# Patient Record
Sex: MALE | Race: WHITE | Employment: FULL TIME | URBAN - METROPOLITAN AREA
[De-identification: names, ages, dates, MRNs, and addresses within clinical notes are randomized per-mention and may not be internally consistent; named-entity substitution may affect disease eponyms.]

---

## 2018-10-02 ENCOUNTER — HOSPITAL ENCOUNTER (OUTPATIENT)
Dept: PHYSICAL THERAPY | Age: 63
Discharge: HOME | End: 2018-10-02
Payer: COMMERCIAL

## 2018-10-02 PROCEDURE — 97112 NEUROMUSCULAR REEDUCATION: CPT

## 2018-10-02 PROCEDURE — 97161 PT EVAL LOW COMPLEX 20 MIN: CPT

## 2018-10-02 NOTE — PROGRESS NOTES
[facility] 
[address] 
[phone] fax Plan of Care/ Statement of Necessity for Physical Therapy Services Patient name: Lisa Smith Start of Care: 10/2/2018 Referral source: Roseanna Chong MD : 1955 Medical Diagnosis: Pain in left shoulder [M25.512] Onset Date:2018 Treatment Diagnosis: left shoulder pain Prior Hospitalization: see medical history Provider#: [id number] Medications: Verified on Patient summary List  
 Comorbidities: cervical spine fusion () Prior Level of Function: functionally independent, working in management/desk position The Plan of Care and following information is based on the information from the initial evaluation. Assessment/ key information: Patient is a 59year-old male who presents with chief complaint of left shoulder pain since falling on left side in 2018. Patient with history of cervical spinal fusion( ), completed course of physical therapy for right UE weakness, now greatly improved. Patient had MRI done on , which showed the following: lateral downsloping/undersurface spurring of the acromion likely contributing to outlet impingement of rotator cuff tendon, mild acromioclavicular arthrosis, subacromial-subdeltoid bursitis, distal anterior supraspinatus tendinosis with low grade partial tearing, small distal subscapularis tendon articular sided moderate grade partial- thickness tear, and proximal biceps tendinopathy with low to moderate grade partial thickness tearing. Patient has difficulty with over-head reaching, dressing, and lifting.  Upon further examination, patient found to have decrease left shoulder ROM ( 75 % gross range limited by pain), impaired left UE strength ( grossly 3+/5 shoulder flexion/ abduction IR/ ER, 4/5 elbow flexion/ ext),and forward head posture with rounded shoulders. Patient would benefit from skilled physical therapy to address deficits listed in order to restore left UE function and improve QOL. Evaluation Complexity History MEDIUM  Complexity : 1-2 comorbidities / personal factors will impact the outcome/ POC ; Examination MEDIUM Complexity : 3 Standardized tests and measures addressing body structure, function, activity limitation and / or participation in recreation  ;Presentation LOW Complexity : Stable, uncomplicated  ;Clinical Decision Making MEDIUM Complexity : FOTO score of 26-74 Overall Complexity Rating: LOW Problem List: pain affecting function, decrease ROM, decrease strength, decrease ADL/ functional abilitiies, decrease activity tolerance and decrease flexibility/ joint mobility Treatment Plan may include any combination of the following: Therapeutic exercise, Therapeutic activities, Neuromuscular re-education, Physical agent/modality, Manual therapy, Patient education, Self Care training, Functional mobility training and Home safety training Patient / Family readiness to learn indicated by: asking questions, trying to perform skills and interest 
Persons(s) to be included in education: patient (P) Barriers to Learning/Limitations: None Patient Goal (s): to avoid surgery and decrease UE pain Patient Self Reported Health Status: good Rehabilitation Potential: good Short Term Goals: To be accomplished in 2 weeks: SGT#1: Patient will be compliant with HEP in order to progress towards long term goals. Status at last note: initiated HEP Long Term Goals: To be accomplished in 8 weeks: LTG #1: Patient will score 66 points on FOTO in order to indicate improved function. Status at last note: 46 points at initial evaluation LTG#2: Patient will achieve 5/5 gross left shoulder strength to increase ease of daily tasks. Status at last note: grossly 3+/5 shoulder flexion/ abduction IR/ ER, 4/5 elbow flexion/ ext LTG#3: Patient will achieve full left shoulder ROM in all planes without pain in order to increase ease of overhead  activities/dressing/lifting. Status at last note: 75% gross range limited by pain LTG#4: Patient will report an overall 60 % improvement in function to restore prior level of function. Status at last note: Will continue to assess throughout course of treatment Frequency / Duration: Patient to be seen 2 times per week for 8 weeks. Patient/ Caregiver education and instruction: Diagnosis, prognosis, self care, activity modification and exercises 
 [x]  Plan of care has been reviewed with ALEXANDER Vivar, PT 10/2/2018 4:06 PM 
_____________________________________________________________________ I certify that the above Therapy Services are being furnished while the patient is under my care. I agree with the treatment plan and certify that this therapy is necessary. [de-identified] Signature:____________Date:_________TIME:________ 
 
[employer], Date and Time must be completed for valid certification ** Please sign and return to [facility] [address] 
[phone] fax

## 2018-10-02 NOTE — PROGRESS NOTES
PT DAILY TREATMENT NOTE 10-18 Patient Name: Isabel Escobar Date:10/2/2018 : 1955 [x]  Patient  Verified Payor: BLUE CROSS / Plan: [facility] PPO / Product Type: PPO / In time:330  Out time:350 Total Treatment Time (min): 20 Visit #: 1 of 16 Medicare/BCBS Only Total Timed Codes (min):  10 1:1 Treatment Time:  20  
 
 
Treatment Area: Pain in left shoulder [M25.512] SUBJECTIVE Pain Level (0-10 scale): 3-4 Any medication changes, allergies to medications, adverse drug reactions, diagnosis change, or new procedure performed?: [x] No    [] Yes (see summary sheet for update) Subjective functional status/changes:   [x] See paper initial evaluation form in patient chart. OBJECTIVE 10 min xEval                  []Re-Eval  
 
10 min Neuromuscular re-education:  [x] See flow sheet :  
Rationale: increase ROM and increase strength to improve the patients ability to increase ease of ADL's. With 
 [] TE 
 [] TA 
 [] neuro 
 [] other: Patient Education: [x] Review HEP [] Progressed/Changed HEP based on:  
[] positioning   [] body mechanics   [] transfers   [] heat/ice application   
[] other:   
 
Other Objective/Functional Measures: no increase in pain reported with gentle isometrics Pain Level (0-10 scale) post treatment:  3-4 ASSESSMENT/Changes in Function:  
 
Patient will continue to benefit from skilled PT services to modify and progress therapeutic interventions, address functional mobility deficits, address ROM deficits, address strength deficits, analyze and address soft tissue restrictions, analyze and cue movement patterns, analyze and modify body mechanics/ergonomics and instruct in home and community integration to attain remaining goals. [x]  See Plan of Care 
[]  See progress note/recertification 
[]  See Discharge Summary PLAN 
[]  Upgrade activities as tolerated     [x]  Continue plan of care []  Update interventions per flow sheet      
[]  Discharge due to:_ 
[]  Other:_   
 
 
 
 
Sandra Denny, PT 10/2/2018  6:56 PM 
 
Future Appointments Date Time Provider Tawana Grajeda 10/5/2018 7:30 AM Belle Momin, PT [id number]  
10/10/2018 5:00 PM Sandip Jaquez, PT [facility]  
10/12/2018 4:15  [address] [facility]  
10/15/2018 5:00  [address] [facility]  
10/17/2018 5:00  [address] [facility]  
10/22/2018 5:00  [address] [facility]  
10/24/2018 5:00  [address] [facility]  
10/29/2018 5:00  [address] [facility]  
10/31/2018 5:00 PM Kathrin Jaquez, PT Shannan Maldonado

## 2018-10-05 ENCOUNTER — APPOINTMENT (OUTPATIENT)
Dept: PHYSICAL THERAPY | Age: 63
End: 2018-10-05
Payer: COMMERCIAL

## 2018-10-10 ENCOUNTER — HOSPITAL ENCOUNTER (OUTPATIENT)
Dept: PHYSICAL THERAPY | Age: 63
Discharge: HOME | End: 2018-10-10
Payer: COMMERCIAL

## 2018-10-10 PROCEDURE — 97112 NEUROMUSCULAR REEDUCATION: CPT

## 2018-10-10 PROCEDURE — 97110 THERAPEUTIC EXERCISES: CPT

## 2018-10-10 PROCEDURE — 97140 MANUAL THERAPY 1/> REGIONS: CPT

## 2018-10-10 NOTE — PROGRESS NOTES
PT DAILY TREATMENT NOTE 10-18 Patient Name: Tete Worthy Date:10/10/2018 : 1955 [x]  Patient  Verified Payor: BLUE CROSS / Plan: [facility] PPO / Product Type: PPO / In time:5:00  Out time:5:48 Total Treatment Time (min): 48 Visit #: 2 of 16 Medicare/BCBS Only Total Timed Codes (min):  48 1:1 Treatment Time:  48 Treatment Area: Pain in left shoulder [M25.512] SUBJECTIVE Pain Level (0-10 scale): 5/10 Any medication changes, allergies to medications, adverse drug reactions, diagnosis change, or new procedure performed?: [x] No    [] Yes (see summary sheet for update) Subjective functional status/changes:   [] No changes reported \"It's getting close to the end of the day so its sore. \" OBJECTIVE 10 min Therapeutic Exercise:  [] See flow sheet :  
Rationale: increase ROM and increase strength to improve the patients ability to increase left shoulder function 23 min Neuromuscular Re-education:  []  See flow sheet :  
Rationale: increase strength and improve coordination  to improve the patients ability to stabilize left shoulder, improve posture, increase ease with ADLs 15 min Manual Therapy:  Assisted/resisted PNF D1, D2; AP/PA GHJ glides Gr I-II to increase ER/IR Rationale: decrease pain and increase ROM to restore proper GHJ biomechanics with left shoulder elevation without compensations With 
 [] TE 
 [] TA 
 [] neuro 
 [] other: Patient Education: [x] Review HEP [] Progressed/Changed HEP based on:  
[] positioning   [] body mechanics   [] transfers   [] heat/ice application   
[] other:   
 
Other Objective/Functional Measures: Initiated Rx program per flow sheet. Pt given verbal and demonstrative cueing for proper technique of all exercises. Pain Level (0-10 scale) post treatment: 0/10 ASSESSMENT/Changes in Function: Pt able to stabilize and set scapulae with exercises without cueing after being educated. Pt noted no increase in overall pain with exercises and reported resolution of soreness after session. Patient will continue to benefit from skilled PT services to address functional mobility deficits, address ROM deficits, address strength deficits, analyze and address soft tissue restrictions, analyze and cue movement patterns, analyze and modify body mechanics/ergonomics, assess and modify postural abnormalities and instruct in home and community integration to attain remaining goals. []  See Plan of Care 
[]  See progress note/recertification 
[]  See Discharge Summary Progress towards goals / Updated goals: 
Short Term Goals: To be accomplished in 2 weeks: STG#1: Patient will be compliant with HEP in order to progress towards long term goals. Status at last note: initiated HEP Current status: met - Pt reports compliance, updated today Long Term Goals: To be accomplished in 8 weeks: LTG #1: Patient will score 66 points on FOTO in order to indicate improved function. Status at last note: 46 points at initial evaluation LTG#2: Patient will achieve 5/5 gross left shoulder strength to increase ease of daily tasks. Status at last note: grossly 3+/5 shoulder flexion/ abduction IR/ ER, 4/5 elbow flexion/ ext LTG#3: Patient will achieve full left shoulder ROM in all planes without pain in order to increase ease of overhead activities/dressing/lifting. Status at last note: 75% gross range limited by pain LTG#4: Patient will report an overall 60 % improvement in function to restore prior level of function. Status at last note: Will continue to assess throughout course of treatment PLAN 
[]  Upgrade activities as tolerated     [x]  Continue plan of care 
[]  Update interventions per flow sheet      
[]  Discharge due to:_ 
[]  Other:_   
 
Skyler Jaquez, PT 10/10/2018  5:51 PM 
 
Future Appointments Date Time Provider Tawana Grajeda 10/12/2018 4:15  [address] [facility]  
10/15/2018 5:00  [address] [facility]  
10/17/2018 5:00  [address] [facility]  
10/22/2018 5:00  [address] [facility]  
10/24/2018 5:00  [address] [facility]  
10/29/2018 5:00  [address] [facility]  
10/31/2018 5:00 PM Kathrin Jaquez, PT Estefania Richardson

## 2018-10-12 ENCOUNTER — HOSPITAL ENCOUNTER (OUTPATIENT)
Dept: PHYSICAL THERAPY | Age: 63
Discharge: HOME | End: 2018-10-12
Payer: COMMERCIAL

## 2018-10-12 PROCEDURE — 97110 THERAPEUTIC EXERCISES: CPT

## 2018-10-12 PROCEDURE — 97140 MANUAL THERAPY 1/> REGIONS: CPT

## 2018-10-12 NOTE — PROGRESS NOTES
PT DAILY TREATMENT NOTE 10-18 Patient Name: Lisa Smith Date:10/12/2018 : 1955 [x]  Patient  Verified Payor: BLUE CROSS / Plan: [facility] PPO / Product Type: PPO / In time:4;15  Out time:5:00 Total Treatment Time (min): 45 Visit #: 3 of 16 Medicare/BCBS Only Total Timed Codes (min):  35 1:1 Treatment Time:  35 Treatment Area: Pain in left shoulder [M25.512] SUBJECTIVE Pain Level (0-10 scale): 2-3 Any medication changes, allergies to medications, adverse drug reactions, diagnosis change, or new procedure performed?: [x] No    [] Yes (see summary sheet for update) Subjective functional status/changes:   [] No changes reported I've been really sore. -6/10 this morning. OBJECTIVE Modality rationale: decrease pain to improve the patients ability to improve activity tolerance Min Type Additional Details  
 [] Estim:  []Unatt       []IFC  []Premod []Other:  []w/ice   []w/heat Position: Location:  
 [] Estim: []Att    []TENS instruct  []NMES []Other:  []w/US   []w/ice   []w/heat Position: Location:  
 []  Traction: [] Cervical       []Lumbar 
                     [] Prone          []Supine []Intermittent   []Continuous Lbs: 
[] before manual 
[] after manual  
 []  Ultrasound: []Continuous   [] Pulsed []1MHz   []3MHz W/cm2: 
Location:  
 []  Iontophoresis with dexamethasone Location: [] Take home patch  
[] In clinic  
10 [x]  Ice     []  heat 
[]  Ice massage 
[]  Laser  
[]  Anodyne Position:  sitting Location:  Left shoulder  
 []  Laser with stim 
[]  Other:  Position: Location:  
 []  Vasopneumatic Device Pressure:       [] lo [] med [] hi  
Temperature: [] lo [] med [] hi  
[x] Skin assessment post-treatment:  [x]intact []redness- no adverse reaction 
  []redness  adverse reaction:  
 
 
20 min Therapeutic Exercise:  [] See flow sheet :  
 Rationale: increase ROM and increase strength to improve the patients ability to perform OH reach,lifting 15 min Manual Therapy:  GHJ mobs, TPR to supraspinatus and infraspinatus. STM to UT and deltoid. Scap mobs/PNF's in SL Rationale: decrease pain, increase ROM and increase tissue extensibility to improve UE function, OH reach With 
 [] TE 
 [] TA 
 [] neuro 
 [] other: Patient Education: [x] Review HEP [] Progressed/Changed HEP based on:  
[] positioning   [] body mechanics   [] transfers   [] heat/ice application   
[] other:   
 
Other Objective/Functional Measures:   
 
Pain Level (0-10 scale) post treatment:  0 
 
ASSESSMENT/Changes in Function: TP's palpable in suprapinatus and biceps. Good scap mobility. Patient will continue to benefit from skilled PT services to modify and progress therapeutic interventions, address functional mobility deficits, address ROM deficits, address strength deficits, analyze and address soft tissue restrictions, analyze and cue movement patterns, analyze and modify body mechanics/ergonomics, assess and modify postural abnormalities, address imbalance/dizziness and instruct in home and community integration to attain remaining goals. []  See Plan of Care 
[]  See progress note/recertification 
[]  See Discharge Summary Progress towards goals / Updated goals: STG#1: Patient will be compliant with HEP in order to progress towards long term goals. Status at last note: initiated HEP Current status: met - Pt reports compliance, updated today Long Term Goals: To be accomplished in 8 weeks: LTG #1: Patient will score 66 points on FOTO in order to indicate improved function. Status at last note: 46 points at initial evaluation LTG#2: Patient will achieve 5/5 gross left shoulder strength to increase ease of daily tasks. Status at last note: grossly 3+/5 shoulder flexion/ abduction IR/ ER, 4/5 elbow flexion/ ext LTG#3: Patient will achieve full left shoulder ROM in all planes without pain in order to increase ease of overhead activities/dressing/lifting. Status at last note: 75% gross range limited by pain LTG#4: Patient will report an overall 60 % improvement in function to restore prior level of function. Status at last note: Will continue to assess throughout course of treatment PLAN [x]  Upgrade activities as tolerated     []  Continue plan of care 
[]  Update interventions per flow sheet      
[]  Discharge due to:_ 
[]  Other:_ Theora Cough, PTA 10/12/2018  4:17 PM 
 
Future Appointments Date Time Provider Tawana Grajeda 10/15/2018 5:00  [address] [facility]  
10/17/2018 5:00  [address] [facility]  
10/22/2018 5:00  [address] [facility]  
10/24/2018 5:00  [address] [facility]  
10/29/2018 5:00  [address] [facility]  
10/31/2018 5:00 PM Kathrin Jaquez, PT [facility]

## 2018-10-15 ENCOUNTER — HOSPITAL ENCOUNTER (OUTPATIENT)
Dept: PHYSICAL THERAPY | Age: 63
Discharge: HOME | End: 2018-10-15
Payer: COMMERCIAL

## 2018-10-15 PROCEDURE — 97140 MANUAL THERAPY 1/> REGIONS: CPT

## 2018-10-15 PROCEDURE — 97110 THERAPEUTIC EXERCISES: CPT

## 2018-10-15 NOTE — PROGRESS NOTES
PT DAILY TREATMENT NOTE 10-18 Patient Name: Chidi Chan Date:10/15/2018 : 1955 [x]  Patient  Verified Payor: BLUE CROSS / Plan: [facility] PPO / Product Type: PPO / In time:5:05  Out time:5;50 Total Treatment Time (min): 45 Visit #: 4 of 16 Medicare/BCBS Only Total Timed Codes (min):  35 1:1 Treatment Time:  35 Treatment Area: Pain in left shoulder [M25.512] SUBJECTIVE Pain Level (0-10 scale): 2-3 Any medication changes, allergies to medications, adverse drug reactions, diagnosis change, or new procedure performed?: [x] No    [] Yes (see summary sheet for update) Subjective functional status/changes:   [] No changes reported I did a lot of yard work Saturday to cleanup after the frank. I felt OK, but the pain yesterday was up to 5-6/10 OBJECTIVE Modality rationale: decrease pain and increase tissue extensibility to improve the patients ability to improve ease of mobility Min Type Additional Details  
 [] Estim:  []Unatt       []IFC  []Premod []Other:  []w/ice   []w/heat Position: Location:  
 [] Estim: []Att    []TENS instruct  []NMES []Other:  []w/US   []w/ice   []w/heat Position: Location:  
 []  Traction: [] Cervical       []Lumbar 
                     [] Prone          []Supine []Intermittent   []Continuous Lbs: 
[] before manual 
[] after manual  
 []  Ultrasound: []Continuous   [] Pulsed []1MHz   []3MHz W/cm2: 
Location:  
 []  Iontophoresis with dexamethasone Location: [] Take home patch  
[] In clinic  
10 [x]  Ice     []  heat 
[]  Ice massage 
[]  Laser  
[]  Anodyne Position:  supine Location:  Left shoulder  
 []  Laser with stim 
[]  Other:  Position: Location:  
 []  Vasopneumatic Device Pressure:       [] lo [] med [] hi  
Temperature: [] lo [] med [] hi  
[x] Skin assessment post-treatment:  [x]intact []redness- no adverse reaction 
  []redness  adverse reaction:  
 
 
15 min Therapeutic Exercise:  [] See flow sheet :  
Rationale: increase ROM and increase strength to improve the patients ability to perform daily tasks, reaching 25 min Manual Therapy:  TPS to infrascap, pec major (upper fibers),  Supraspinatus. Scap mobs. subclavius. Rationale: decrease pain, increase ROM and increase tissue extensibility to perform OH reaching, ADL's With 
 [] TE 
 [] TA 
 [] neuro 
 [] other: Patient Education: [x] Review HEP [] Progressed/Changed HEP based on:  
[] positioning   [] body mechanics   [] transfers   [] heat/ice application   
[] other:   
 
Other Objective/Functional Measures:   
 
Pain Level (0-10 scale) post treatment:  0 
 
ASSESSMENT/Changes in Function: pt progressing towards goals. Decreased c/o pain with wall slides. Patient will continue to benefit from skilled PT services to modify and progress therapeutic interventions, address functional mobility deficits, address ROM deficits, address strength deficits, analyze and address soft tissue restrictions, analyze and cue movement patterns, analyze and modify body mechanics/ergonomics, assess and modify postural abnormalities, address imbalance/dizziness and instruct in home and community integration to attain remaining goals. []  See Plan of Care 
[]  See progress note/recertification 
[]  See Discharge Summary Progress towards goals / Updated goals: STG#1: Patient will be compliant with HEP in order to progress towards long term goals. Status at last note: initiated HEP Current status: met - Pt reports compliance, updated today Long Term Goals: To be accomplished in 8 weeks: LTG #1: Patient will score 66 points on FOTO in order to indicate improved function. Status at last note: 46 points at initial evaluation LTG#2: Patient will achieve 5/5 gross left shoulder strength to increase ease of daily tasks. Status at last note: grossly 3+/5 shoulder flexion/ abduction IR/ ER, 4/5 elbow flexion/ ext LTG#3: Patient will achieve full left shoulder ROM in all planes without pain in order to increase ease of overhead activities/dressing/lifting. Status at last note: 75% gross range limited by pain LTG#4: Patient will report an overall 60 % improvement in function to restore prior level of function. Status at last note: Will continue to assess throughout course of treatment 
  
 
PLAN [x]  Upgrade activities as tolerated     []  Continue plan of care 
[]  Update interventions per flow sheet      
[]  Discharge due to:_ 
[]  Other:_ Kevin Otto PTA 10/15/2018  5:18 PM 
 
Future Appointments Date Time Provider Tawana Grajeda 10/17/2018 5:00  [address] [facility]  
10/22/2018 5:00  [address] [facility]  
10/24/2018 5:00  [address] [facility]  
10/29/2018 5:00  [address] [facility]  
10/31/2018 5:00 PM Kathrin Jaquez, PT [facility]

## 2018-10-17 ENCOUNTER — HOSPITAL ENCOUNTER (OUTPATIENT)
Dept: PHYSICAL THERAPY | Age: 63
Discharge: HOME | End: 2018-10-17
Payer: COMMERCIAL

## 2018-10-17 PROCEDURE — 97110 THERAPEUTIC EXERCISES: CPT

## 2018-10-17 PROCEDURE — 97140 MANUAL THERAPY 1/> REGIONS: CPT

## 2018-10-22 ENCOUNTER — HOSPITAL ENCOUNTER (OUTPATIENT)
Dept: PHYSICAL THERAPY | Age: 63
Discharge: HOME | End: 2018-10-22
Payer: COMMERCIAL

## 2018-10-22 PROCEDURE — 97110 THERAPEUTIC EXERCISES: CPT

## 2018-10-22 PROCEDURE — 97140 MANUAL THERAPY 1/> REGIONS: CPT

## 2018-10-22 NOTE — PROGRESS NOTES
PT DAILY TREATMENT NOTE 10-18 Patient Name: Austin Miller Date:10/22/2018 : 1955 [x]  Patient  Verified Payor: BLUE CROSS / Plan: [facility] PPO / Product Type: PPO / In time:458  Out time:552 Total Treatment Time (min): 54 Visit #: 6 of 16 Medicare/BCBS Only Total Timed Codes (min):  44 1:1 Treatment Time:  44 Treatment Area: Pain in left shoulder [M25.512] SUBJECTIVE Pain Level (0-10 scale): 3 Any medication changes, allergies to medications, adverse drug reactions, diagnosis change, or new procedure performed?: [x] No    [] Yes (see summary sheet for update) Subjective functional status/changes:   [] No changes reported The pain was even a little more earlier today. OBJECTIVE Modality rationale: decrease inflammation and decrease pain to improve the patients ability to improve activity tolerance Type Additional Details  
[] Estim:  []Unatt       []IFC  []Premod []Other:  []w/ice   []w/heat Position: Location:  
[] Estim: []Att    []TENS instruct  []NMES []Other:  []w/US   []w/ice   []w/heat Position: Location:  
[]  Traction: [] Cervical       []Lumbar 
                     [] Prone          []Supine []Intermittent   []Continuous Lbs: 
[] before manual 
[] after manual  
[]  Ultrasound: []Continuous   [] Pulsed []1MHz   []3MHz W/cm2: 
Location:  
[]  Iontophoresis with dexamethasone Location: [] Take home patch  
[] In clinic [x]  Ice     []  heat 10 min 
[]  Ice massage 
[]  Laser  
[]  Anodyne Position:seated Location:Left shoulder  
[]  Laser with stim 
[]  Other:  Position: Location:  
[]  Vasopneumatic Device Pressure:       [] lo [] med [] hi  
Temperature: [] lo [] med [] hi  
[] Skin assessment post-treatment:  []intact []redness- no adverse reaction 
  []redness  adverse reaction: 29 min Therapeutic Exercise:  [x] See flow sheet :  
Rationale: increase ROM and increase strength to improve the patients ability to improve ease of reaching, lifting tasks 15 min Manual Therapy:  STM/TrP release Left subscapularis/deltoid/infraspinatus, manually resisted PNF D1/2 patterns, Left shoulder manual stretching Rationale: decrease pain, increase ROM, increase tissue extensibility and decrease trigger points to perform ADL With 
 [] TE 
 [] TA 
 [] neuro 
 [] other: Patient Education: [x] Review HEP [] Progressed/Changed HEP based on:  
[] positioning   [] body mechanics   [] transfers   [] heat/ice application   
[] other:   
 
Other Objective/Functional Measures: increased repetitions per flow sheet Pain Level (0-10 scale) post treatment: 1-1.5 ASSESSMENT/Changes in Function: Anterior/superior Left shoulder pain with serratus wall slides. Requested dry needling for Left shoulder/scapula due to good PROM but palpable trigger points and impingement-like pain. Patient in agreement. Patient will continue to benefit from skilled PT services to modify and progress therapeutic interventions, address functional mobility deficits, address ROM deficits, address strength deficits, analyze and address soft tissue restrictions, analyze and cue movement patterns, analyze and modify body mechanics/ergonomics, assess and modify postural abnormalities and instruct in home and community integration to attain remaining goals. []  See Plan of Care 
[]  See progress note/recertification 
[]  See Discharge Summary Progress towards goals / Updated goals: STG#1: Patient will be compliant with HEP in order to progress towards long term goals. Status at last note: initiated HEP Current status: met - Pt reports compliance, updated today Long Term Goals: To be accomplished in 8 weeks: LTG #1: Patient will score 66 points on FOTO in order to indicate improved function. Status at last note: 46 points at initial evaluation Current status: FOTO 50  progressing LTG#2: Patient will achieve 5/5 gross left shoulder strength to increase ease of daily tasks. Status at last note: grossly 3+/5 shoulder flexion/ abduction IR/ ER, 4/5 elbow flexion/ ext Current status: Flexion 4+/5, IR/ER 5/5, ABD 4-/5 LTG#3: Patient will achieve full left shoulder ROM in all planes without pain in order to increase ease of overhead activities/dressing/lifting. Status at last note: 75% gross range limited by pain Current status:Flexion 165, abd/ER/IR WNL with tightness, no pain LTG#4: Patient will report an overall 60 % improvement in function to restore prior level of function. Status at last note: Will continue to assess throughout course of treatment Current status: Not met, no real change reported yet PLAN [x]  Upgrade activities as tolerated     [x]  Continue plan of care 
[]  Update interventions per flow sheet      
[]  Discharge due to:_ 
[]  Other:_   
 
Hlaley Acosta, PT 10/22/2018  3:41 PM 
 
Future Appointments Date Time Provider Tawana Grajeda 10/22/2018  5:00 PM Hung Fung, PT [facility] G[facility] Thomas  
10/24/2018  [facility]  
10/29/2018  5:00 PM Eddie [address]  
10/31/2018  5:00 PM Daughtry, Meryle Metz, PT [facility]

## 2018-10-22 NOTE — PROGRESS NOTES
Request for use of Dry Needling/Intramuscular Manual Therapy Patient: Arun Renteria     Referral Source: Piter Amrao MD 
Diagnosis: Pain in left shoulder [M25.512]      : 1955 Date of initial visit: 10/2/18   Attended visits: 6  Missed Visits: 0 Based on findings from the physical therapy examination and evaluation, the evaluating therapist believes the patient, Arun Renteria  would benefit from including Dry Needling as part of the plan of care. Dry needling is a treatment technique utilized in conjunction with other PT interventions to inactivate myofascial trigger points and the pain and dysfunction they cause. Dry Needling is an advanced procedure that requires additional training including greater than 54 hours of intensive course work. Physical Therapists at [address] [place] [address] are trained and/or certified through [employer] for their education. PROCEDURE: 
? Solid filament sterile needle (typically 0.3mm/30 gauge) inserted into a trigger point ? Repeated movements inactivate the trigger points, taking 30-60 seconds per site ? Typically consists of 1 dry needling session per week and a possible second treatment including muscle re-education, flexibility, strengthening and other manual techniques to facilitate the benefits of dry needling BENEFITS: 
? Inactivation of trigger points ? Decreased pain ? Increased muscle length ? Improved movement patterns ? Restoration of function POTENTIAL RISKS: 
? Post-needling soreness ? Infection ? Bruising/bleeding ? Penetration of a nerve ? Pneumothorax All treating PTs have been thoroughly educated in avoiding adverse reactions If you agree with this recommendation, please sign this form and fax it to us at [phone]. If you have questions or concerns regarding dry needling or any other treatment we may be providing, please contact us at [phone] Thank you for allowing us to assist in the care of your patient. Nina Virgen, PT    10/22/2018 5:42 PM  
NOTE TO PHYSICIAN:  PLEASE COMPLETE THE ORDERS BELOW AND  
FAX TO [facility]: [phone] If you are unable to process this request in 24 hours please contact our office:  
[phone] I have read the above request and AGREE to the recommendation of including dry needling as part of the plan of care.  
 
 
[de-identified] Signature:____________Date:_________TIME:________ 
 
[employer], Date and Time must be completed for valid certification **

## 2018-10-24 ENCOUNTER — HOSPITAL ENCOUNTER (OUTPATIENT)
Dept: PHYSICAL THERAPY | Age: 63
Discharge: HOME | End: 2018-10-24
Payer: COMMERCIAL

## 2018-10-24 PROCEDURE — 97140 MANUAL THERAPY 1/> REGIONS: CPT

## 2018-10-24 PROCEDURE — 97110 THERAPEUTIC EXERCISES: CPT

## 2018-10-24 NOTE — PROGRESS NOTES
PT DAILY TREATMENT NOTE 10-18 Patient Name: Kevan Yu Date:10/24/2018 : 1955 [x]  Patient  Verified Payor: BLUE CROSS / Plan: [facility] PPO / Product Type: PPO / In time:5;05  Out time:5;50 Total Treatment Time (min): 45 Visit #: 7 of 16 Medicare/BCBS Only Total Timed Codes (min):  45 1:1 Treatment Time:  45  
 
 
Treatment Area: Pain in left shoulder [M25.512] SUBJECTIVE Pain Level (0-10 scale): 3 Any medication changes, allergies to medications, adverse drug reactions, diagnosis change, or new procedure performed?: [x] No    [] Yes (see summary sheet for update) Subjective functional status/changes:   [] No changes reported The pain never changes OBJECTIVE 30 min Therapeutic Exercise:  [] See flow sheet :  
Rationale: increase ROM and increase strength to improve the patients ability to improve ease of job tasks, with typing, ADL's 
 
 
15 min Manual Therapy:  Posterior GHJ mobs. TPR to subclavicular, pec minor, major. Rationale: decrease pain, increase ROM and increase tissue extensibility to reduce impingement With 
 [] TE 
 [] TA 
 [] neuro 
 [] other: Patient Education: [x] Review HEP [] Progressed/Changed HEP based on:  
[] positioning   [] body mechanics   [] transfers   [] heat/ice application   
[] other:   
 
Other Objective/Functional Measures:   
 
Pain Level (0-10 scale) post treatment: 2 
 
ASSESSMENT/Changes in Function: Full pain free ER with shoulder abducted above 60 degrees. Onset of Impingement symptoms with abduction at neutral and up to about 60 degrees.  
 
Patient will continue to benefit from skilled PT services to modify and progress therapeutic interventions, address functional mobility deficits, address ROM deficits, address strength deficits, analyze and address soft tissue restrictions, analyze and cue movement patterns, analyze and modify body mechanics/ergonomics, assess and modify postural abnormalities, address imbalance/dizziness and instruct in home and community integration to attain remaining goals. []  See Plan of Care 
[]  See progress note/recertification 
[]  See Discharge Summary Progress towards goals / Updated goals: STG#1: Patient will be compliant with HEP in order to progress towards long term goals. Status at last note: initiated HEP Current status: met - Pt reports compliance, updated today Long Term Goals: To be accomplished in 8 weeks: LTG #1: Patient will score 66 points on FOTO in order to indicate improved function. Status at last note: 46 points at initial evaluation Current status: FOTO 50  progressing LTG#2: Patient will achieve 5/5 gross left shoulder strength to increase ease of daily tasks. Status at last note: grossly 3+/5 shoulder flexion/ abduction IR/ ER, 4/5 elbow flexion/ ext  
Current status: Flexion 4+/5, IR/ER 5/5, ABD 4-/5 LTG#3: Patient will achieve full left shoulder ROM in all planes without pain in order to increase ease of overhead activities/dressing/lifting. Status at last note: 75% gross range limited by pain Current status:Flexion 165, abd/ER/IR WNL with tightness, no pain LTG#4: Patient will report an overall 60 % improvement in function to restore prior level of function. Status at last note: Will continue to assess throughout course of treatment Current status: Not met, no real change reported yet 
  
 
PLAN [x]  Upgrade activities as tolerated     []  Continue plan of care 
[]  Update interventions per flow sheet      
[]  Discharge due to:_ 
[]  Other:_ Carroll Méndez PTA 10/24/2018  5:04 PM 
 
Future Appointments Date Time Provider Tawana Grajeda 10/29/2018  5:00 PM Jovana Chilel, PT [facility] [place]  
10/31/2018  5:00 PM Yenny Jaquez, PT [facility]

## 2018-10-29 ENCOUNTER — HOSPITAL ENCOUNTER (OUTPATIENT)
Dept: PHYSICAL THERAPY | Age: 63
End: 2018-10-29
Payer: COMMERCIAL

## 2018-10-31 ENCOUNTER — HOSPITAL ENCOUNTER (OUTPATIENT)
Dept: PHYSICAL THERAPY | Age: 63
Discharge: HOME | End: 2018-10-31
Payer: COMMERCIAL

## 2018-10-31 PROCEDURE — 97110 THERAPEUTIC EXERCISES: CPT

## 2018-10-31 PROCEDURE — 97140 MANUAL THERAPY 1/> REGIONS: CPT

## 2018-10-31 NOTE — PROGRESS NOTES
[facility] MELBA RUBIO [facility] 
[address] [place], [address] [place] [address] 
[phone] fax Progress Note Patient name: Ciara Medeiros Start of Care: 10/2/2018 Referral source: Amee Up MD : 1955 Medical Diagnosis: Pain in left shoulder [M25.512] 
  Onset Date:2018 Treatment Diagnosis: left shoulder pain Prior Hospitalization: see medical history Provider#: [id number] Medications: Verified on Patient summary List  
 Comorbidities: cervical spine fusion () Prior Level of Function: functionally independent, working in management/desk position Visits from Start of Care: 8    Missed Visits: 1 Short Term Goals: To be accomplished in 1 week: STG#1: Patient will be compliant with HEP in order to progress towards long term goals. Status at last note: initiated HEP Current status: met - Pt reports compliance, updated today Long Term Goals: To be accomplished in 8 weeks: LTG #1: Patient will score 66 points on FOTO in order to indicate improved function. Status at last note: 46 points at initial evaluation Current status: progressing - FOTO 50 pts LTG#2: Patient will achieve 5/5 gross left shoulder strength to increase ease of daily tasks. Status at last note: grossly 3+/5 shoulder flexion/ abduction IR/ ER, 4/5 elbow flexion/ ext  
Current status: progressing - Flexion 4+/5, IR/ER 5/5, ABD 4-/5 LTG#3: Patient will achieve full left shoulder ROM in all planes without pain in order to increase ease of overhead activities/dressing/lifting. Status at last note: 75% gross range limited by pain Current status: progressing - Flexion 165, abd/ER/IR WNL with tightness, no pain LTG#4: Patient will report an overall 60% improvement in function to restore prior level of function. Status at last note: Will continue to assess throughout course of treatment Current status: progressing - 15% improvement since start of PT 
 
 Key Functional Changes: Pt reports improved left shoulder AROM and some gains in left shoulder strength but continues to have difficulty with daily tasks. Pt wakes 2-3x/night due to pain and discomfort with sleeping, especially lying on left side. Sitting and leaning on elbows produces pain in left deltoid through shoulder blade. Pt would benefit from continued skilled PT to improve rotator cuff strength with the inclusion of dry needling to reduce pain to improve functional task completion to avoid need for surgical intervention. Updated Goals: to be achieved in 5 weeks: LTG #1: Patient will score 66 points on FOTO in order to indicate improved function. Status at last note: FOTO 50 pts Current status: LTG#2: Patient will achieve 5/5 gross left shoulder strength to increase ease of daily tasks. Status at last note: Flexion 4+/5, IR/ER 5/5, ABD 4-/5 Current status: LTG#3: Patient will achieve full left shoulder ROM in all planes without pain in order to increase ease of overhead activities/dressing/lifting. Status at last note: Flexion 165, abd/ER/IR WNL with tightness, no pain Current status: LTG#4: Patient will report an overall 60% improvement in function to restore prior level of function. Status at last note: 15% improvement since start of PT Current status:  
 
ASSESSMENT/RECOMMENDATIONS: 
[x]Continue therapy per initial plan/protocol at a frequency of  2 x per week for 5 weeks []Continue therapy with the following recommended changes:_____________________      _____________________________________________________________________ []Discontinue therapy progressing towards or have reached established goals []Discontinue therapy due to lack of appreciable progress towards goals []Discontinue therapy due to lack of attendance or compliance []Await Physician's recommendations/decisions regarding therapy []Other:________________________________________________________________ Thank you for this referral.  
Franki Lambcarolina Jaquez, PT 10/31/2018 5:16 PM 
NOTE TO PHYSICIAN:  [address] AND  
FAX TO [facility]: [phone] If you are unable to process this request in 24 hours please contact our office: [phone] []  I have read the above report and request that my patient continue as recommended. []  I have read the above report and request that my patient continue therapy with the following changes/special instructions:________________________________________ []I have read the above report and request that my patient be discharged from therapy.  
 
[de-identified] Signature:____________Date:_________TIME:________ 
 
[employer], Date and Time must be completed for valid certification **

## 2018-10-31 NOTE — PROGRESS NOTES
PT DAILY TREATMENT NOTE 10-18 Patient Name: Farzana Mcneill Date:10/31/2018 : 1955 [x]  Patient  Verified Payor: BLUE CROSS / Plan: [facility] PPO / Product Type: PPO / In time:5:00  Out time:5:55 Total Treatment Time (min): 55 Visit #: 8 of 16 Medicare/BCBS Only Total Timed Codes (min):  45 1:1 Treatment Time:  45  
 
 
Treatment Area: Pain in left shoulder [M25.512] SUBJECTIVE Pain Level (0-10 scale): 4-4.5/10 Any medication changes, allergies to medications, adverse drug reactions, diagnosis change, or new procedure performed?: [x] No    [] Yes (see summary sheet for update) Subjective functional status/changes:   [] No changes reported \"It's not good today. It's sore. \" OBJECTIVE 37 min Therapeutic Exercise:  [] See flow sheet :  
Rationale: increase ROM and increase strength to improve the patients ability to increase ease with ADLs, job duties 8 min Manual Therapy: manually resisted PNF patterns D1, D2 left UE Rationale: decrease pain, increase ROM and increase tissue extensibility to improve shoulder/scapular stability With 
 [] TE 
 [] TA 
 [] neuro 
 [] other: Patient Education: [x] Review HEP [] Progressed/Changed HEP based on:  
[] positioning   [] body mechanics   [] transfers   [] heat/ice application   
[] other:   
 
Other Objective/Functional Measures: Progressed Rx program per flow sheet to improve shoulder/scapular strength and stability. Pain Level (0-10 scale) post treatment: 1/10 ASSESSMENT/Changes in Function: Pt reports improved left shoulder AROM and some gains in left shoulder strength but continues to have difficulty with daily tasks. Pt wakes 2-3x/night due to pain and discomfort with sleeping, especially lying on left side. Sitting and leaning on elbows produces pain in left deltoid through shoulder blade.    
 
Patient will continue to benefit from skilled PT services to modify and progress therapeutic interventions, address functional mobility deficits, address ROM deficits, address strength deficits, analyze and address soft tissue restrictions, analyze and cue movement patterns, analyze and modify body mechanics/ergonomics, assess and modify postural abnormalities, address imbalance/dizziness and instruct in home and community integration to attain remaining goals. []  See Plan of Care [x]  See progress note/recertification 
[]  See Discharge Summary Progress towards goals / Updated goals: 
Short Term Goals: To be accomplished in 1 week: STG#1: Patient will be compliant with HEP in order to progress towards long term goals. Status at last note: initiated HEP Current status: met - Pt reports compliance, updated today Long Term Goals: To be accomplished in 8 weeks: LTG #1: Patient will score 66 points on FOTO in order to indicate improved function. Status at last note: 46 points at initial evaluation Current status: progressing - FOTO 50 pts LTG#2: Patient will achieve 5/5 gross left shoulder strength to increase ease of daily tasks. Status at last note: grossly 3+/5 shoulder flexion/ abduction IR/ ER, 4/5 elbow flexion/ ext  
Current status: progressing - Flexion 4+/5, IR/ER 5/5, ABD 4-/5 LTG#3: Patient will achieve full left shoulder ROM in all planes without pain in order to increase ease of overhead activities/dressing/lifting. Status at last note: 75% gross range limited by pain Current status: progressing - Flexion 165, abd/ER/IR WNL with tightness, no pain LTG#4: Patient will report an overall 60% improvement in function to restore prior level of function. Status at last note: Will continue to assess throughout course of treatment Current status: progressing - 15% improvement since start of PT 
  
PLAN [x]  Upgrade activities as tolerated     []  Continue plan of care 
[]  Update interventions per flow sheet      
[]  Discharge due to:_ 
[]  Other:_   
 
 Skyler Jaquez, PT 10/31/2018  5:04 PM 
 
No future appointments.

## 2018-11-13 ENCOUNTER — HOSPITAL ENCOUNTER (OUTPATIENT)
Dept: PHYSICAL THERAPY | Age: 63
Discharge: HOME | End: 2018-11-13
Payer: COMMERCIAL

## 2018-11-13 PROCEDURE — 97140 MANUAL THERAPY 1/> REGIONS: CPT

## 2018-11-13 PROCEDURE — 97110 THERAPEUTIC EXERCISES: CPT

## 2018-11-13 NOTE — PROGRESS NOTES
PT DAILY TREATMENT NOTE 10-18 Patient Name: Amor Cuadra Date:2018 : 1955 [x]  Patient  Verified Payor: BLUE CROSS / Plan: [facility] PPO / Product Type: PPO / In time:5:00  Out time:5:50 Total Treatment Time (min):50 Visit #: 9 of 16 Medicare/BCBS Only Total Timed Codes (min): 50   1:1 Treatment Time: 50 Treatment Area: Left shoulder pain [M25.512] SUBJECTIVE Pain Level (0-10 scale): 0/10 Any medication changes, allergies to medications, adverse drug reactions, diagnosis change, or new procedure performed?: [x] No    [] Yes (see summary sheet for update) Subjective functional status/changes:   [] No changes reported \"I don't have any pain right now but earlier it was like -6/10. It hurts at the end of the work day and after using the arm. I set a follow up appt with Dr. Fide Campos for 18. If it doesn't get any better, then we will have to go to the next step. All the pain is right in my deltoid. \" OBJECTIVE 35 min Therapeutic Exercise:  [] See flow sheet :  
Rationale: increase ROM and increase strength to improve the patients ability to increase ease with ADLs, job duties 15 min Manual Therapy: scapular mobs, distraction; Post GHJ glides, Gr III; manual stretching Rationale: decrease pain, increase ROM and increase tissue extensibility to correct shoulder/scapular mechanics, positioning; improve mobility With 
 [] TE 
 [] TA 
 [] neuro 
 [] other: Patient Education: [x] Review HEP [] Progressed/Changed HEP based on:  
[] positioning   [] body mechanics   [] transfers   [] heat/ice application   
[] other:   
 
Other Objective/Functional Measures: Performed program per flow sheet. Pt exhibits FWD left shoulder and clicking with shoulder elevation and eccentric lowering with catching at 90 deg of lowering. Pain Level (0-10 scale) post treatment: 0/10 (sore) ASSESSMENT/Changes in Function: Pt continues to report burning pain in left deltoid with shoulder movements. Pt advised to practice scapular squeezes in supine and Pec stretch in doorway to release Pec Mjr and seat humeral head a little more posteriorly to decrease anterior translation of humeral head with shoulder elevation. Patient will continue to benefit from skilled PT services to modify and progress therapeutic interventions, address functional mobility deficits, address ROM deficits, address strength deficits, analyze and address soft tissue restrictions, analyze and cue movement patterns, analyze and modify body mechanics/ergonomics, assess and modify postural abnormalities, address imbalance/dizziness and instruct in home and community integration to attain remaining goals. []  See Plan of Care [x]  See progress note/recertification 
[]  See Discharge Summary Progress towards goals / Updated goals:Short Term Goals: To be accomplished in 1 week: STG#1: Patient will be compliant with HEP in order to progress towards long term goals. Status at last note: initiated HEP Current status: met - Pt reports compliance, updated today Long Term Goals: To be accomplished in 8 weeks: LTG #1: Patient will score 66 points on FOTO in order to indicate improved function. Status at last note: 46 points at initial evaluation Current status: progressing - FOTO 50 pts LTG#2: Patient will achieve 5/5 gross left shoulder strength to increase ease of daily tasks. Status at last note: grossly 3+/5 shoulder flexion/ abduction IR/ ER, 4/5 elbow flexion/ ext  
Current status: progressing - Flexion 4+/5, IR/ER 5/5, ABD 4-/5 LTG#3: Patient will achieve full left shoulder ROM in all planes without pain in order to increase ease of overhead activities/dressing/lifting. Status at last note: 75% gross range limited by pain Current status: progressing - Flexion 165, abd/ER/IR WNL with tightness, no pain LTG#4: Patient will report an overall 60% improvement in function to restore prior level of function. Status at last note: Will continue to assess throughout course of treatment Current status: progressing - 15% improvement since start of PT 
  
PLAN [x]  Upgrade activities as tolerated     []  Continue plan of care 
[]  Update interventions per flow sheet      
[]  Discharge due to:_ 
[]  Other:_   
 
Nuris Jaquez, PT 11/13/2018  5:04 PM 
 
Future Appointments Date Time Provider Tawana Grajeda 11/16/2018  4:15 PM Diane Hoyt, PT [id number]  
11/19/2018  5:00 PM Adelfo [facility]  
11/26/2018  5:00 PM Diane Hoyt, PT [facility]  
11/29/2018  5:00 PM Bambi Valdez, PT [facility]

## 2018-11-16 ENCOUNTER — HOSPITAL ENCOUNTER (OUTPATIENT)
Dept: PHYSICAL THERAPY | Age: 63
Discharge: HOME | End: 2018-11-16
Payer: COMMERCIAL

## 2018-11-16 PROCEDURE — 97110 THERAPEUTIC EXERCISES: CPT

## 2018-11-16 PROCEDURE — 97140 MANUAL THERAPY 1/> REGIONS: CPT

## 2018-11-16 NOTE — PROGRESS NOTES
PT DAILY TREATMENT NOTE 10-18 Patient Name: Alexa Soliz Date:2018 : 1955 [x]  Patient  Verified Payor: BLUE CROSS / Plan: [facility] PPO / Product Type: PPO / In time:410  Out time:459 Total Treatment Time (min): 49 Visit #: 10 of 16 Medicare/BCBS Only Total Timed Codes (min):  44 1:1 Treatment Time:  44 Treatment Area: Left shoulder pain [M25.512] SUBJECTIVE Pain Level (0-10 scale): 4.5 Any medication changes, allergies to medications, adverse drug reactions, diagnosis change, or new procedure performed?: [x] No    [] Yes (see summary sheet for update) Subjective functional status/changes:   [] No changes reported Later in the day like now it starts to hurt. OBJECTIVE Modality rationale: decrease inflammation and decrease pain to improve the patients ability to improve activity tolerance Type Additional Details  
[] Estim:  []Unatt       []IFC  []Premod []Other:  []w/ice   []w/heat Position: Location:  
[] Estim: []Att    []TENS instruct  []NMES []Other:  []w/US   []w/ice   []w/heat Position: Location:  
[]  Traction: [] Cervical       []Lumbar 
                     [] Prone          []Supine []Intermittent   []Continuous Lbs: 
[] before manual 
[] after manual  
[]  Ultrasound: []Continuous   [] Pulsed []1MHz   []3MHz W/cm2: 
Location:  
[]  Iontophoresis with dexamethasone Location: [] Take home patch  
[] In clinic [x]  Ice     []  heat 
[]  Ice massage 
[]  Laser  
[]  Anodyne Position:seated Location:Left shoulder  
[]  Laser with stim 
[]  Other:  Position: Location:  
[]  Vasopneumatic Device Pressure:       [] lo [] med [] hi  
Temperature: [] lo [] med [] hi  
[] Skin assessment post-treatment:  []intact []redness- no adverse reaction 
  []redness  adverse reaction:  
 
8 min Therapeutic Exercise:  [x] See flow sheet :  
 Rationale: increase ROM and increase strength to improve the patients ability to perform daily tasks 36 min Manual Therapy:  Dry needling Left shoulder girdle Rationale: decrease pain, increase ROM, increase tissue extensibility and decrease trigger points to improve shoulder mechanics Dry Needling Procedure Note Procedure: An intramuscular manual therapy (dry needling) and a neuro-muscular re-education treatment was done to deactivate myofascial trigger points with a 30 gauge filament needle under aseptic technique. Indications: 
[x] Myofascial pain and dysfunction [] Muscled spasms [] Myalgia/myositis   [] Muscle cramps [x] Muscle imbalances  [] Temporomandibular Dysfunction 
[] Other: 
 
Chart reviewed for the following: 
Avel OLIVER PT, have reviewed the medical history, summary list and precautions/contraindications for [employer]. TIME OUT performed immediately prior to start of procedure: 
Avel OLIVER PT, have performed the following reviews on [employer] prior to the start of the session:     
[x] Verified patient identification by name and date of birth   
[x] Agreement on all muscles being treated was verified  
[x] Purpose of dry needling, side effects, possible complications, risks and benefits were explained to the patient [x] Procedure site(s) verified 
[x] Patient was positioned for comfort and draped for privacy [x] Informed Consent was signed (initial visit) and verified verbally (subsequent visits) [x] Patient was instructed on the need to report the use of blood thinners and/or immunosuppressant medications [x] How to respond to possible adverse effects of treatment 
[x] Self treatment of post needling soreness: ice, heat (moist heat, heat wraps) and stretching 
[x] Opportunity was given to ask any questions, all questions were answered Time: 410 Date of procedure: 11/16/2018 Treatment: The following muscles were treated today with intramuscular dry needling 
[] Left [] Right Masster 
[] Left [] Right Temporalis 
[] Left [] Right Zygomaticus Major / Minor 
[] Left [] Right Lateral Pterygoid 
[] Left [] Right Medial Pterygoid 
[] Left [] Right Digastric Post / Anterior Belly 
[] Left [] Right Sternocleidomastoid 
[] Left [] Right Scalene Anterior / Medial / Posterior 
[] Left [] Right Extra Laryngeal Muscles [x] Left [] Right Upper Trapezius 
[] Left [] Right Middle Trapezius 
[] Left [] Right Lower Trapezius 
[] Left [] Right Oblique Capitis Inferior 
[] Left [] Right Splenius Capitis / Cervicis 
[] Left [] Right Semispinalis: Capitis / Cervicis 
[] Left [] Right Multifidi / Rotatores Cervicis / Thoracic 
[] Left [] Right Longissimus Thoracis / Illiocostalis 
[] Left [] Right Levator Scapulae 
[x] Left [] Right Supraspinatus / Infraspinatus 
[] Left [] Right Teres Major / Minor 
[] Left [] Right Rhomboids / Serratus posterior superior 
[] Left [] Right Pectoralis Major / Minor 
[x] Left [] Right Serratus Anterior 
[] Left [] Right Latissimus Dorsi 
[] Left [] Right Subscapularis 
[] Left [] Right Coracobrachialis 
[] Left [] Right Biceps Brachii 
[x] Left [] Right Deltoid: Anterior / Medial / Posterior 
[] Left [] Right Brachialis 
[] Left [] Right Triceps 
[] Left [] Right Brachioradialis 
[] Left [] Right Extensor Carpi Radialis Brevis / Extensor Carpi Radialis Longus    [] Left [] Right  Extensor digitorum 
[] Left [] Right Supinator / Pronator Teres 
[] Left [] Right Flexor Carpi Radialis/ Flexor Carpi Ulnaris  
[] Left [] Right  Flexor Digitorum Superficialis/ Flexor Digitorum Profundus 
[] Left [] Right Flexor Pollicis Longus / Flexor Pollicis Brevis / Palmaris Longus 
[] Left [] Right Abductor Pollicis Longus / Abductor Pollicis Brevis 
[] Left [] Right Opponens Pollicis / Adductor Pollicis 
[] Left [] Right Dorsal / Palmar Interossei / Lumbricalis [] Left [] Right Abductor Digiti Minimi / Opponens Digiti Minimi Patient's response to today's treatment: 
[x] Latent Twitch Response     [x] Muscle relaxation [x] Pain Relief 
[x] Post needling soreness    [] without complications [x] Increased Range of Motion   [] Decreased headaches   
[] Decreased Tinnitus [] Other:  
 
Performed by: Gordo Swanson, PT With 
 [] TE 
 [] TA 
 [] neuro 
 [] other: Patient Education: [x] Review HEP [] Progressed/Changed HEP based on:  
[] positioning   [] body mechanics   [] transfers   [] heat/ice application   
[] other:   
 
Other Objective/Functional Measures: held exercises after needling Pain Level (0-10 scale) post treatment: 3 
 
ASSESSMENT/Changes in Function: Initiated dry needling to improve Left shoulder mechanics. Several latent twitch responses elicited in multiple muscles. Decreased muscle soreness as well as decreased tension headache following needling. Patient will continue to benefit from skilled PT services to modify and progress therapeutic interventions, address functional mobility deficits, address ROM deficits, address strength deficits, analyze and address soft tissue restrictions, analyze and cue movement patterns, analyze and modify body mechanics/ergonomics, assess and modify postural abnormalities and instruct in home and community integration to attain remaining goals. []  See Plan of Care 
[]  See progress note/recertification 
[]  See Discharge Summary Progress towards goals / Updated goals: LTG #1: Patient will score 66 points on FOTO in order to indicate improved function. Status at last note: FOTO 50 pts Current status: reassess in a few visits LTG#2: Patient will achieve 5/5 gross left shoulder strength to increase ease of daily tasks. Status at last note: Flexion 4+/5, IR/ER 5/5, ABD 4-/5 Current status: reassess next visit LTG#3: Patient will achieve full left shoulder ROM in all planes without pain in order to increase ease of overhead activities/dressing/lifting. Status at last note: Flexion 165, abd/ER/IR WNL with tightness, no pain Current status: Progressing, improved mobility and reduced soreness after needling LTG#4: Patient will report an overall 60% improvement in function to restore prior level of function. Status at last note: 15% improvement since start of PT Current status: Not met, no better, maybe worse PLAN [x]  Upgrade activities as tolerated     [x]  Continue plan of care 
[]  Update interventions per flow sheet      
[]  Discharge due to:_ 
[]  Other:_   
 
Maddy Simpson, PT 11/16/2018  11:28 AM 
 
Future Appointments Date Time Provider Tawana Grajeda 11/16/2018  4:15 PM Tom Dumont, PT [id number]  
11/19/2018  5:00 PM Marika Mahajan [facility]  
11/26/2018  5:00 PM Tom Dumont, PT [facility]  
11/29/2018  5:00 PM Seema Tan, PT [facility]

## 2018-11-19 ENCOUNTER — HOSPITAL ENCOUNTER (OUTPATIENT)
Dept: PHYSICAL THERAPY | Age: 63
Discharge: HOME | End: 2018-11-19
Payer: COMMERCIAL

## 2018-11-19 PROCEDURE — 97110 THERAPEUTIC EXERCISES: CPT

## 2018-11-19 PROCEDURE — 97140 MANUAL THERAPY 1/> REGIONS: CPT

## 2018-11-19 NOTE — PROGRESS NOTES
PT DAILY TREATMENT NOTE 10-18 Patient Name: Arun Renteria Date:2018 : 1955 [x]  Patient  Verified Payor: BLUE CROSS / Plan: [facility] PPO / Product Type: PPO / In time:4:55  Out time:5;45 Total Treatment Time (min): 50 Visit #: 35 CV 72 Medicare/BCBS Only Total Timed Codes (min):  40 1:1 Treatment Time:  50 Treatment Area: Left shoulder pain [M25.512] SUBJECTIVE Pain Level (0-10 scale):  3.5 Any medication changes, allergies to medications, adverse drug reactions, diagnosis change, or new procedure performed?: [x] No    [] Yes (see summary sheet for update) Subjective functional status/changes:   [] No changes reported Not sure if the dry needling worked. Has more pain afterwards. OBJECTIVE Modality rationale: decrease pain to improve the patients ability to perform daily tasks Min Type Additional Details  
 [] Estim:  []Unatt       []IFC  []Premod []Other:  []w/ice   []w/heat Position: Location:  
 [] Estim: []Att    []TENS instruct  []NMES []Other:  []w/US   []w/ice   []w/heat Position: Location:  
 []  Traction: [] Cervical       []Lumbar 
                     [] Prone          []Supine []Intermittent   []Continuous Lbs: 
[] before manual 
[] after manual  
 []  Ultrasound: []Continuous   [] Pulsed []1MHz   []3MHz W/cm2: 
Location:  
 []  Iontophoresis with dexamethasone Location: [] Take home patch  
[] In clinic  
10 [x]  Ice     []  heat 
[]  Ice massage 
[]  Laser  
[]  Anodyne Position: sitting Location: left shoulder  
 []  Laser with stim 
[]  Other:  Position: Location:  
 []  Vasopneumatic Device Pressure:       [] lo [] med [] hi  
Temperature: [] lo [] med [] hi  
[x] Skin assessment post-treatment:  [x]intact []redness- no adverse reaction 
  []redness  adverse reaction: 25 min Therapeutic Exercise:  [] See flow sheet :  
Rationale: increase ROM and increase strength to improve the patients ability to perform ADL's, job duties 15 min Manual Therapy:  TPR to left UT, pec minor Rationale: decrease pain, increase ROM and increase tissue extensibility to improve ease of reach With 
 [] TE 
 [] TA 
 [] neuro 
 [] other: Patient Education: [x] Review HEP [] Progressed/Changed HEP based on:  
[] positioning   [] body mechanics   [] transfers   [] heat/ice application   
[] other:   
 
Other Objective/Functional Measures:   
 
Pain Level (0-10 scale) post treatment:  2 
 
ASSESSMENT/Changes in Function:  Taut bands in left UT, residual soreness after DN at session. Patient will continue to benefit from skilled PT services to modify and progress therapeutic interventions, address functional mobility deficits, address ROM deficits, address strength deficits, analyze and address soft tissue restrictions, analyze and cue movement patterns, analyze and modify body mechanics/ergonomics, assess and modify postural abnormalities, address imbalance/dizziness and instruct in home and community integration to attain remaining goals. []  See Plan of Care 
[]  See progress note/recertification 
[]  See Discharge Summary Progress towards goals / Updated goals: STG#1: Patient will be compliant with HEP in order to progress towards long term goals. Status at last note: initiated HEP Current status: met - Pt reports compliance, updated today Long Term Goals: To be accomplished in 8 weeks: LTG #1: Patient will score 66 points on FOTO in order to indicate improved function. Status at last note: 46 points at initial evaluation Current status: progressing - FOTO 50 pts LTG#2: Patient will achieve 5/5 gross left shoulder strength to increase ease of daily tasks.  
Status at last note: grossly 3+/5 shoulder flexion/ abduction IR/ ER, 4/5 elbow flexion/ ext  
 Current status: progressing - Flexion 4+/5, IR/ER 5/5, ABD 4-/5 LTG#3: Patient will achieve full left shoulder ROM in all planes without pain in order to increase ease of overhead activities/dressing/lifting. Status at last note: 75% gross range limited by pain Current status: progressing - Flexion 165, abd/ER/IR WNL with tightness, no pain LTG#4: Patient will report an overall 60% improvement in function to restore prior level of function. Status at last note: Will continue to assess throughout course of treatment Current status: progressing - 15% improvement since start of PT 
 
PLAN [x]  Upgrade activities as tolerated     []  Continue plan of care 
[]  Update interventions per flow sheet      
[]  Discharge due to:_ 
[]  Other:_ Elias Fernandez, ALEXANDER 11/19/2018  5:00 PM 
 
Future Appointments Date Time Provider Tawana Grajeda 11/26/2018  5:00 PM Hung Fung, PT [facility]  
11/29/2018  5:00 PM Blair Field, PT [facility]

## 2018-11-26 ENCOUNTER — HOSPITAL ENCOUNTER (OUTPATIENT)
Dept: PHYSICAL THERAPY | Age: 63
Discharge: HOME | End: 2018-11-26
Payer: COMMERCIAL

## 2018-11-26 PROCEDURE — 97140 MANUAL THERAPY 1/> REGIONS: CPT

## 2018-11-26 PROCEDURE — 97112 NEUROMUSCULAR REEDUCATION: CPT

## 2018-11-26 NOTE — PROGRESS NOTES
PT DAILY TREATMENT NOTE 10-18 Patient Name: Jd Perdomo Date:2018 : 1955 [x]  Patient  Verified Payor: BLUE CROSS / Plan: [facility] PPO / Product Type: PPO / In time:500  Out time:548 Total Treatment Time (min): 48 Visit #: 12 of 16 Medicare/BCBS Only Total Timed Codes (min):  38 1:1 Treatment Time:  45 Treatment Area: Left shoulder pain [M25.512] SUBJECTIVE Pain Level (0-10 scale): 5.5 Any medication changes, allergies to medications, adverse drug reactions, diagnosis change, or new procedure performed?: [x] No    [] Yes (see summary sheet for update) Subjective functional status/changes:   [] No changes reported The needling didn't really seem to help. It's not been a good day. OBJECTIVE Modality rationale: decrease inflammation and decrease pain to improve the patients ability to improve activity tolerance Type Additional Details  
[] Estim:  []Unatt       []IFC  []Premod []Other:  []w/ice   []w/heat Position: Location:  
[] Estim: []Att    []TENS instruct  []NMES []Other:  []w/US   []w/ice   []w/heat Position: Location:  
[]  Traction: [] Cervical       []Lumbar 
                     [] Prone          []Supine []Intermittent   []Continuous Lbs: 
[] before manual 
[] after manual  
[]  Ultrasound: []Continuous   [] Pulsed []1MHz   []3MHz W/cm2: 
Location:  
[]  Iontophoresis with dexamethasone Location: [] Take home patch  
[] In clinic [x]  Ice     []  Heat 10 min 
[]  Ice massage 
[]  Laser  
[]  Anodyne Position:seated Location:Left shoulder  
[]  Laser with stim 
[]  Other:  Position: Location:  
[]  Vasopneumatic Device Pressure:       [] lo [] med [] hi  
Temperature: [] lo [] med [] hi  
[] Skin assessment post-treatment:  []intact []redness- no adverse reaction 
  []redness  adverse reaction: 23 min Neuromuscular Re-education:  [x]  See flow sheet :  
Rationale: increase strength and improve coordination  to improve the patients ability to improve scapular stability 15 min Manual Therapy:  Dry needling Left shoulder Rationale: decrease pain, increase ROM, increase tissue extensibility and decrease trigger points to improve shoulder mechanics Dry Needling Procedure Note Procedure: An intramuscular manual therapy (dry needling) and a neuro-muscular re-education treatment was done to deactivate myofascial trigger points with a 30 gauge filament needle under aseptic technique. Indications: 
[x] Myofascial pain and dysfunction [] Muscled spasms [] Myalgia/myositis   [] Muscle cramps [x] Muscle imbalances  [] Temporomandibular Dysfunction 
[] Other: 
 
Chart reviewed for the following: 
I, Aram Holstein, PT, have reviewed the medical history, summary list and precautions/contraindications for [employer]. TIME OUT performed immediately prior to start of procedure: 
I, Aram Holstein, PT, have performed the following reviews on [employer] prior to the start of the session:     
[x] Verified patient identification by name and date of birth   
[x] Agreement on all muscles being treated was verified  
[x] Purpose of dry needling, side effects, possible complications, risks and benefits were explained to the patient [x] Procedure site(s) verified 
[x] Patient was positioned for comfort and draped for privacy [x] Informed Consent was signed (initial visit) and verified verbally (subsequent visits) [x] Patient was instructed on the need to report the use of blood thinners and/or immunosuppressant medications [x] How to respond to possible adverse effects of treatment 
[x] Self treatment of post needling soreness: ice, heat (moist heat, heat wraps) and stretching 
[x] Opportunity was given to ask any questions, all questions were answered Time: 505 Date of procedure: 11/26/2018 Treatment: The following muscles were treated today with intramuscular dry needling 
[] Left [] Right Masster 
[] Left [] Right Temporalis 
[] Left [] Right Zygomaticus Major / Minor 
[] Left [] Right Lateral Pterygoid 
[] Left [] Right Medial Pterygoid 
[] Left [] Right Digastric Post / Anterior Belly 
[] Left [] Right Sternocleidomastoid 
[] Left [] Right Scalene Anterior / Medial / Posterior 
[] Left [] Right Extra Laryngeal Muscles [x] Left [] Right Upper Trapezius 
[] Left [] Right Middle Trapezius 
[] Left [] Right Lower Trapezius 
[] Left [] Right Oblique Capitis Inferior 
[] Left [] Right Splenius Capitis / Cervicis 
[] Left [] Right Semispinalis: Capitis / Cervicis 
[] Left [] Right Multifidi / Rotatores Cervicis / Thoracic 
[] Left [] Right Longissimus Thoracis / Illiocostalis 
[] Left [] Right Levator Scapulae 
[x] Left [] Right Supraspinatus / Infraspinatus 
[] Left [] Right Teres Major / Minor 
[] Left [] Right Rhomboids / Serratus posterior superior 
[] Left [] Right Pectoralis Major / Minor 
[] Left [] Right Serratus Anterior 
[] Left [] Right Latissimus Dorsi 
[x] Left [] Right Subscapularis 
[] Left [] Right Coracobrachialis 
[] Left [] Right Biceps Brachii 
[x] Left [] Right Deltoid: Anterior / Medial / Posterior 
[] Left [] Right Brachialis 
[] Left [] Right Triceps 
[] Left [] Right Brachioradialis 
[] Left [] Right Extensor Carpi Radialis Brevis / Extensor Carpi Radialis Longus    [] Left [] Right  Extensor digitorum 
[] Left [] Right Supinator / Pronator Teres 
[] Left [] Right Flexor Carpi Radialis/ Flexor Carpi Ulnaris  
[] Left [] Right  Flexor Digitorum Superficialis/ Flexor Digitorum Profundus 
[] Left [] Right Flexor Pollicis Longus / Flexor Pollicis Brevis / Palmaris Longus 
[] Left [] Right Abductor Pollicis Longus / Abductor Pollicis Brevis 
[] Left [] Right Opponens Pollicis / Adductor Pollicis 
[] Left [] Right Dorsal / Palmar Interossei / Lumbricalis [] Left [] Right Abductor Digiti Minimi / Opponens Digiti Minimi Patient's response to today's treatment: 
[x] Latent Twitch Response     [] Muscle relaxation [] Pain Relief 
[x] Post needling soreness    [] without complications 
[] Increased Range of Motion   [] Decreased headaches   
[] Decreased Tinnitus [] Other:  
 
Performed by: Juan David Parish, PT With 
 [] TE 
 [] TA 
 [] neuro 
 [] other: Patient Education: [x] Review HEP [] Progressed/Changed HEP based on:  
[] positioning   [] body mechanics   [] transfers   [] heat/ice application   
[] other:   
 
Other Objective/Functional Measures: completed exercises per flow sheet Pain Level (0-10 scale) post treatment: 3 
 
ASSESSMENT/Changes in Function: Patient reports no real change in symptoms after first session of needling but performed again due to continued muscle imbalances. He reports improved flexibility in the neck afterward. Discussed possible discharge at next visit due to last one scheduled and no real improvement recently. Patient will continue to benefit from skilled PT services to modify and progress therapeutic interventions, address functional mobility deficits, address ROM deficits, address strength deficits, analyze and address soft tissue restrictions, analyze and cue movement patterns, analyze and modify body mechanics/ergonomics, assess and modify postural abnormalities and instruct in home and community integration to attain remaining goals. []  See Plan of Care 
[]  See progress note/recertification 
[]  See Discharge Summary Progress towards goals / Updated goals: LTG #1: Patient will score 66 points on FOTO in order to indicate improved function. Status at last note: FOTO 50 pts Current status: reassess in a few visits LTG#2: Patient will achieve 5/5 gross left shoulder strength to increase ease of daily tasks. Status at last note: Flexion 4+/5, IR/ER 5/5, ABD 4-/5 Current status: Not met, grossly 4/5 with pain LTG#3: Patient will achieve full left shoulder ROM in all planes without pain in order to increase ease of overhead activities/dressing/lifting. Status at last note: Flexion 165, abd/ER/IR WNL with tightness, no pain Current status: Progressing, improved mobility and reduced soreness after needling LTG#4: Patient will report an overall 60% improvement in function to restore prior level of function. Status at last note: 15% improvement since start of PT Current status: Not met, no better, maybe worse PLAN [x]  Upgrade activities as tolerated     [x]  Continue plan of care 
[]  Update interventions per flow sheet      
[]  Discharge due to:_ 
[]  Other:_   
 
Maria Del Carmen Herbert, PT 11/26/2018  11:29 AM 
 
Future Appointments Date Time Provider Tawana Grajeda 11/26/2018  5:00 PM Svetlana Rouse, PT [facility]  
11/29/2018  5:00 PM Nancy Sharp, PT [facility]

## 2018-11-29 ENCOUNTER — HOSPITAL ENCOUNTER (OUTPATIENT)
Dept: PHYSICAL THERAPY | Age: 63
Discharge: HOME | End: 2018-11-29
Payer: COMMERCIAL

## 2018-11-29 PROCEDURE — 97140 MANUAL THERAPY 1/> REGIONS: CPT

## 2018-11-29 PROCEDURE — 97110 THERAPEUTIC EXERCISES: CPT

## 2018-11-29 NOTE — PROGRESS NOTES
[facility] PERKINS [facility] 
[address], [address] [place] [address] 
[phone] fax Discharge Summary Patient name: Pam Davenport Computer of Care: 10/2/2018 Referral source: Hernan Pabon MD : [id number]               
Medical Diagnosis: Pain in left shoulder [M25.512] 
  Onset Date:2018               
Treatment Diagnosis: left shoulder pain Prior Hospitalization: see medical history Provider#: [id number] Medications: Verified on Patient summary List  
 Comorbidities: cervical spine fusion () 
 Prior Level of Function: functionally independent, working in management/desk position Visits from Start of Care: 13    Missed Visits: 1 Reporting Period : 10/2/218 to 2018 LTG #1: Patient will score 66 points on FOTO in order to indicate improved function. Status at last note: FOTO 50 pts Current status: 50 pts LTG#2: Patient will achieve 5/5 gross left shoulder strength to increase ease of daily tasks. Status at last note: Flexion 4+/5, IR/ER 5/5, ABD 4-/5 Current status: grossly 4/5 with pain LTG#3: Patient will achieve full left shoulder ROM in all planes without pain in order to increase ease of overhead activities/dressing/lifting. Status at last note: Flexion 165, abd/ER/IR WNL with tightness, no pain Current status: flexion 165 with pain at end range and with eccentric lowing, intermittent catching LTG#4: Patient will report an overall 60% improvement in function to restore prior level of function. Status at last note: 15% improvement since start of PT Current status: 20 % Assessment/ Summary of Care: Patient has attended 13 therapy sessions for left shoulder pain and has made limited progress towards long term goals. He reports a 20% improvement in overall symptoms since start of care.  He continues to c/o pain with overhead reaching, catching with eccentric lowering, and weakness causing difficulty with daily and work tasks. Patient reports he will be following up with orthopedic MD for further recommendations and possible need for surgical intervention. Educated patient to continue with HEP to assist with management of symptoms. Patient agreeable to discharge at this time due to little improvement and limited progress made towards goals. RECOMMENDATIONS: 
[x]Discontinue therapy: []Patient has reached or is progressing toward set goals []Patient is non-compliant or has abdicated 
    [x]Due to lack of appreciable progress towards set goals Amando Roberson, PT 11/29/2018 5:45 PM

## 2018-11-29 NOTE — PROGRESS NOTES
PT DAILY TREATMENT NOTE 10-18 Patient Name: Maricel Ramos Date:2018 : 1955 [x]  Patient  Verified Payor: BLUE CROSS / Plan: [facility] PPO / Product Type: PPO / In time:500  Out time:530 Total Treatment Time (min): 30 Visit #: 43 AV 44 Medicare/BCBS Only Total Timed Codes (min):  30 1:1 Treatment Time:  30 Treatment Area: Left shoulder pain [M25.512] SUBJECTIVE Pain Level (0-10 scale): 4-5 Any medication changes, allergies to medications, adverse drug reactions, diagnosis change, or new procedure performed?: [x] No    [] Yes (see summary sheet for update) Subjective functional status/changes:   [] No changes reported Patient reports minimal improvement in symptoms since [facility]. OBJECTIVE 20 min Therapeutic Exercise:  [x] See flow sheet :  
Rationale: increase ROM and increase strength to improve the patients ability to improve ease of ADLs and work tasks. 10 min Manual Therapy:  ARABELLA guallpa grade III, long axis distraction Rationale: decrease pain, increase ROM and increase tissue extensibility to improve ease of reaching With 
 [] TE 
 [] TA 
 [] neuro 
 [] other: Patient Education: [x] Review HEP [] Progressed/Changed HEP based on:  
[] positioning   [] body mechanics   [] transfers   [] heat/ice application   
[] other:   
 
Other Objective/Functional Measures:   
20 % improvement Pain Level (0-10 scale) post treatment: 3 
 
ASSESSMENT/Changes in Function: Patient reports he will be following up with orthopedic MD in December for further recommendation. []  See Plan of Care 
[]  See progress note/recertification 
[x]  See Discharge Summary Progress towards goals / Updated goals: LTG #1: Patient will score 66 points on FOTO in order to indicate improved function. Status at last note: FOTO 50 pts Current status: reassess in a few visits LTG#2: Patient will achieve 5/5 gross left shoulder strength to increase ease of daily tasks. Status at last note: Flexion 4+/5, IR/ER 5/5, ABD 4-/5 Current status: Not met, grossly 4/5 with pain LTG#3: Patient will achieve full left shoulder ROM in all planes without pain in order to increase ease of overhead activities/dressing/lifting. Status at last note: Flexion 165, abd/ER/IR WNL with tightness, no pain Current status: Progressing, improved mobility and reduced soreness after needling LTG#4: Patient will report an overall 60% improvement in function to restore prior level of function. Status at last note: 15% improvement since start of PT Current status: Not met, no better, maybe worse 
  
 
 
 
PLAN 
[]  Upgrade activities as tolerated     []  Continue plan of care 
[]  Update interventions per flow sheet [x]  Discharge due to: limited progress towards goals, follow up with ortho MD in December 
[]  Other:_   
 
Tee Zambrano PT 11/29/2018  4:54 PM 
 
Future Appointments Date Time Provider Tawana Grajeda 11/29/2018  5:00 PM Jaime Hennessy, PT [facility]

## 2019-02-07 ENCOUNTER — HOSPITAL ENCOUNTER (OUTPATIENT)
Dept: PHYSICAL THERAPY | Age: 64
Discharge: HOME | End: 2019-02-07
Payer: COMMERCIAL

## 2019-02-07 PROCEDURE — 97110 THERAPEUTIC EXERCISES: CPT

## 2019-02-07 PROCEDURE — 97162 PT EVAL MOD COMPLEX 30 MIN: CPT

## 2019-02-07 PROCEDURE — 97140 MANUAL THERAPY 1/> REGIONS: CPT

## 2019-02-07 NOTE — PROGRESS NOTES
PT DAILY TREATMENT NOTE 10-18 Patient Name: Domingo Jackson Date:2019 : 1955 [x]  Patient  Verified Payor: BLUE CROSS / Plan: [facility] PPO / Product Type: PPO / In time:2:03  Out time:2:56 Total Treatment Time (min): 53 Visit #: 1 of 24-40 (5x/week for 2 weeks, 3x/week for 6 weeks) Medicare/BCBS Only Total Timed Codes (min):  23 1:1 Treatment Time:  48 Treatment Area: Pain in left shoulder [M25.512] SUBJECTIVE Pain Level (0-10 scale): 4-5 Any medication changes, allergies to medications, adverse drug reactions, diagnosis change, or new procedure performed?: [x] No    [] Yes (see summary sheet for update) Subjective functional status/changes:   [] No changes reported Patient is a 59 y.o male presenting 3 days s/p left shoulder arthroscopy, SAD, bicep tenodesis, subscapularis repair, and capsular release with manipulation. Patient is currently wearing sling with bandage still intact. Patient rates his pain as a 4-5/10. OBJECTIVE Modality rationale: decrease edema, decrease inflammation and decrease pain to improve the patients ability to perform ADLs with improved ease. Min Type Additional Details  
 [] Estim:  []Unatt       []IFC  []Premod []Other:  []w/ice   []w/heat Position: Location:  
 [] Estim: []Att    []TENS instruct  []NMES []Other:  []w/US   []w/ice   []w/heat Position: Location:  
 []  Traction: [] Cervical       []Lumbar 
                     [] Prone          []Supine []Intermittent   []Continuous Lbs: 
[] before manual 
[] after manual  
 []  Ultrasound: []Continuous   [] Pulsed []1MHz   []3MHz W/cm2: 
Location:  
 []  Iontophoresis with dexamethasone Location: [] Take home patch  
[] In clinic  
10 [x]  Ice     []  heat 
[]  Ice massage 
[]  Laser  
[]  Anodyne Position: semi reclined Location: left shoulder []  Laser with stim 
[]  Other:  Position: Location:  
 []  Vasopneumatic Device Pressure:       [] lo [] med [] hi  
Temperature: [] lo [] med [] hi  
[] Skin assessment post-treatment:  []intact []redness- no adverse reaction 
  []redness  adverse reaction:  
 
20 min [x]Eval                  []Re-Eval  
 
 
13 min Therapeutic Exercise:  [x] See flow sheet : HEP exercises Rationale: increase ROM to improve the patients ability to perform ADLs with improved ease. 10 min Manual Therapy:  Left shoulder PROM Rationale: increase ROM and increase tissue extensibility to improve patients functional mobility. With 
 [] TE 
 [] TA 
 [] neuro 
 [] other: Patient Education: [x] Review HEP [] Progressed/Changed HEP based on:  
[] positioning   [] body mechanics   [] transfers   [] heat/ice application   
[] other:   
 
Other Objective/Functional Measures: See IE Pain Level (0-10 scale) post treatment: 4-5 ASSESSMENT/Changes in Function: Patient reports with impairments consistent with increased left shoulder pain, mild joint effusion, limited left shoulder PROM. Patient is restriceted to PROM only for 6 weeks with no active supination or elbow flexion for 6 weeks. Patient will benefit from skilled physical therapy in order to address the listed impairments. Patient will continue to benefit from skilled PT services to modify and progress therapeutic interventions, address functional mobility deficits, address ROM deficits, address strength deficits, analyze and address soft tissue restrictions, analyze and cue movement patterns and analyze and modify body mechanics/ergonomics to attain remaining goals. [x]  See Plan of Care 
[]  See progress note/recertification 
[]  See Discharge Summary Progress towards goals / Updated goals: 
Short Term Goals: To be accomplished in 2 weeks:              1. Patient will be independent and compliant with HEP in order to improve functional mobility. 2. Patient will be able to decrease left shoulder pain to no more than 2/10 in order to improve ease of future ADLs. Long Term Goals: To be accomplished in 8 weeks: 1. Patient will be able to improve FOTO score to 59 in order to demonstrate improvements in functional independence. 2. Patient will be able to improve left shoulder PROM flexion to 120 degrees in order to improve ease of future ADLs. 3. Patient will be able to improve left shoulder PROM abduction to 100 degrees in order to improve functional mobility. 4. Patient will be able to improve left shoulder AROM flexion to 100 degrees (when able per protocol) in order to improve ease of reaching a shelf shoulder height. PLAN 
[]  Upgrade activities as tolerated     [x]  Continue plan of care 
[]  Update interventions per flow sheet      
[]  Discharge due to:_ 
[]  Other:_ Margret Torres, PT 2/7/2019  5:28 PM 
 
Future Appointments Date Time Provider Tawana Goddardi 2/11/2019  8:30 AM Zo Tate, PTA [facility]  
2/12/2019  3:30 PM Aimee Roldan, PTA [facility]  
2/13/2019  3:30 PM Aimee Roldan, PTA [facility]  
2/14/2019  5:00 PM Reji Lime, PT [facility]  
2/15/2019  3:30 PM Reji Lime, PT [facility]  
2/18/2019 11:00 AM Aimee Roldan, PTA [facility]  
2/19/2019 11:30 AM Zo Tate, PTA [facility]  
2/20/2019  2:30 PM Ino Gem, PTA [facility]  
2/21/2019  3:30 PM Reji Lime, PT [facility]  
2/22/2019  2:30 PM Virgene Lea, PTA [facility]  
2/25/2019  4:30 PM Reji Lime, PT [facility]  
2/27/2019  4:30 PM Reji Lime, PT [facility]  
3/1/2019  4:00 PM Reji Lime, PT [facility]  
3/4/2019  3:30 PM Ino Gem, PTA [facility]  
3/6/2019  4:30 PM Reji Lime, PT [facility]  
 3/8/2019  4:00 PM Terese Kocher A, PT [facility]  
3/11/2019  4:00 PM Terese Kocher A, PT [facility]  
3/13/2019  4:00 PM Terese Kocher, PT [facility]  
3/15/2019  4:00 PM Terese Kocher, PT [facility]  
3/18/2019  4:00 PM Terese Kocher, PT [facility]  
3/20/2019  4:00 PM Fleet Brought, PTA [facility]  
3/22/2019  4:00 PM Terese Kocher, PT [facility]  
3/25/2019  4:00 PM Terese Kocher, PT [facility]  
3/27/2019  4:00 PM Fleet Brought, PTA [facility]  
3/29/2019  4:00 PM Terese Kocher, PT [facility]  
4/1/2019  4:00 PM Terese Kocher, PT [facility]  
4/3/2019  4:00 PM Terese Kocher, PT [facility]  
4/5/2019  4:00 PM Terese Kocher, PT [facility]

## 2019-02-07 NOTE — PROGRESS NOTES
In [address]  [place] [address] [place], [address] 
[phone] fax Plan of Care/ Statement of Necessity for Physical Therapy Services Patient name: Gisela Banegas Start of Care: 2019 Referral source: Pablo Dos Santos MD : 1955 Medical Diagnosis: Pain in left shoulder [M25.512] Payor: [facility] / Plan: [facility] PPO / Product Type: PPO /  Onset Date: DOS: 19 Treatment Diagnosis: Left subscapularis repair, bicep tenodesis, SAD, and capsular release with manipulation Prior Hospitalization: see medical history Provider#: [id number] Medications: Verified on Patient summary List  
 Comorbidities: none reported Prior Level of Function: (I) with all ADLs and recreational activities The Plan of Care and following information is based on the information from the initial evaluation. Assessment/ key information: Patient is a 59 y.o male presenting 3 days s/p left shoulder arthroscopy, SAD, bicep tenodesis, subscapularis repair, and capsular release with manipulation. Patient is currently wearing sling with bandage still intact. Patient rates his pain as a 4-5/10. Patient reports with impairments consistent with increased left shoulder pain, mild joint effusion, limited left shoulder PROM. Patient is restriceted to PROM only for 6 weeks with no active supination or elbow flexion for 6 weeks. Patient will benefit from skilled physical therapy in order to address the listed impairments. Evaluation Complexity History MEDIUM  Complexity : 1-2 comorbidities / personal factors will impact the outcome/ POC ; Examination MEDIUM Complexity : 3 Standardized tests and measures addressing body structure, function, activity limitation and / or participation in recreation  ;Presentation MEDIUM Complexity : Evolving with changing characteristics  ; Clinical Decision Making HIGH Complexity : FOTO score of 1- 25  
 Overall Complexity Rating: MEDIUM Problem List: pain affecting function, decrease ROM, decrease strength, edema affecting function, decrease ADL/ functional abilitiies, decrease activity tolerance and decrease flexibility/ joint mobility Treatment Plan may include any combination of the following: Therapeutic exercise, Neuromuscular re-education, Physical agent/modality, Manual therapy, Patient education and Functional mobility training Patient / Family readiness to learn indicated by: asking questions, trying to perform skills and interest 
Persons(s) to be included in education: patient (P) Barriers to Learning/Limitations: None Patient Goal (s): get back to normal Patient Self Reported Health Status: fair Rehabilitation Potential: good Short Term Goals: To be accomplished in 2 weeks: 1. Patient will be independent and compliant with HEP in order to improve functional mobility. 2. Patient will be able to decrease left shoulder pain to no more than 2/10 in order to improve ease of future ADLs. Long Term Goals: To be accomplished in 8 weeks: 1. Patient will be able to improve FOTO score to 59 in order to demonstrate improvements in functional independence. 2. Patient will be able to improve left shoulder PROM flexion to 120 degrees in order to improve ease of future ADLs. 3. Patient will be able to improve left shoulder PROM abduction to 100 degrees in order to improve functional mobility. 4. Patient will be able to improve left shoulder AROM flexion to 100 degrees (when able per protocol) in order to improve ease of reaching a shelf shoulder height. Frequency / Duration: Patient to be seen 3-5 times per week for 8 weeks. Patient/ Caregiver education and instruction: Diagnosis, prognosis, activity modification and exercises 
 [x]  Plan of care has been reviewed with PTA Sherryle Bookbinder, PT 2/7/2019 5:13 PM 
 
 ________________________________________________________________________ I certify that the above Therapy Services are being furnished while the patient is under my care. I agree with the treatment plan and certify that this therapy is necessary. [address] [place] [address] Signature:____________Date:_________TIME:________ 
 
[employer], Date and Time must be completed for valid certification ** Please sign and return to In [address] [place] [address] [place], [address] 
[phone] fax

## 2019-02-11 ENCOUNTER — HOSPITAL ENCOUNTER (OUTPATIENT)
Dept: PHYSICAL THERAPY | Age: 64
Discharge: HOME | End: 2019-02-11
Payer: COMMERCIAL

## 2019-02-11 PROCEDURE — 97016 VASOPNEUMATIC DEVICE THERAPY: CPT

## 2019-02-11 PROCEDURE — 97140 MANUAL THERAPY 1/> REGIONS: CPT

## 2019-02-11 PROCEDURE — 97110 THERAPEUTIC EXERCISES: CPT

## 2019-02-11 NOTE — PROGRESS NOTES
PT DAILY TREATMENT NOTE 10-18 Patient Name: Can Pedro Date:2019 : 1955 [x]  Patient  Verified Payor: BLUE CROSS / Plan: [facility] PPO / Product Type: PPO / In time:8:30  Out time:9:10 Total Treatment Time (min): 40 Visit #: 2 of 24-40 Medicare/BCBS Only Total Timed Codes (min):  30 1:1 Treatment Time:  30 Treatment Area: Pain in left shoulder [M25.512] SUBJECTIVE Pain Level (0-10 scale): 5 Any medication changes, allergies to medications, adverse drug reactions, diagnosis change, or new procedure performed?: [x] No    [] Yes (see summary sheet for update) Subjective functional status/changes:   [] No changes reported Pt reports the cushion from his shoulder sling is causing some discomfort OBJECTIVE Modality rationale: decrease inflammation and decrease pain to improve the patients ability to perform daily tasks Type Additional Details  
[] Estim:  []Unatt       []IFC  []Premod []Other:  []w/ice   []w/heat Position: Location:  
[] Estim: []Att    []TENS instruct  []NMES []Other:  []w/US   []w/ice   []w/heat Position: Location:  
[]  Traction: [] Cervical       []Lumbar 
                     [] Prone          []Supine []Intermittent   []Continuous Lbs: 
[] before manual 
[] after manual  
[]  Ultrasound: []Continuous   [] Pulsed []1MHz   []3MHz W/cm2: 
Location:  
[]  Iontophoresis with dexamethasone Location: [] Take home patch  
[] In clinic  
[]  Ice     []  heat 
[]  Ice massage 
[]  Laser  
[]  Anodyne Position: Location:  
[]  Laser with stim 
[]  Other:  Position: Location:  
[x]  Vasopneumatic Device Pressure:       [x] lo [] med [] hi  
Temperature: [x] lo [] med [] hi  
[] Skin assessment post-treatment:  []intact []redness- no adverse reaction 
  []redness  adverse reaction: 20 min Therapeutic Exercise:  [] See flow sheet :  
Rationale: increase ROM and increase strength to improve the patients ability to perform ADLs when appropriate per protocol 10 min Manual Therapy:  PROM left shoulder and elbow Rationale: decrease pain, increase ROM and increase tissue extensibility to improve functional mobility With 
 [] TE 
 [] TA 
 [] neuro 
 [] other: Patient Education: [x] Review HEP [] Progressed/Changed HEP based on:  
[] positioning   [] body mechanics   [] transfers   [] heat/ice application   
[] other:   
 
Other Objective/Functional Measures:  
First visit since Eval  
 
Pain Level (0-10 scale) post treatment: 5 
 
ASSESSMENT/Changes in Function: Initiated treatment program per flow sheet. Reviewed HEP for understanding and compliance. Pt demo's good understanding of shoulder precautions. Cont progressing per protocol. Patient will continue to benefit from skilled PT services to modify and progress therapeutic interventions, address functional mobility deficits, address ROM deficits, address strength deficits, analyze and address soft tissue restrictions, analyze and cue movement patterns, analyze and modify body mechanics/ergonomics and assess and modify postural abnormalities to attain remaining goals. []  See Plan of Care 
[]  See progress note/recertification 
[]  See Discharge Summary Progress towards goals / Updated goals: 
Short Term Goals: To be accomplished in 2 weeks: 
             1. Patient will be independent and compliant with HEP in order to improve functional mobility. Met- pt reports compliance with HEP              2. Patient will be able to decrease left shoulder pain to no more than 2/10 in order to improve ease of future ADLs. Long Term Goals: To be accomplished in 8 weeks: 
             1. Patient will be able to improve FOTO score to 59 in order to demonstrate improvements in functional independence.              2. Patient will be able to improve left shoulder PROM flexion to 120 degrees in order to improve ease of future ADLs.              3. Patient will be able to improve left shoulder PROM abduction to 100 degrees in order to improve functional mobility.              4. Patient will be able to improve left shoulder AROM flexion to 100 degrees (when able per protocol) in order to improve ease of reaching a shelf shoulder height. PLAN [x]  Upgrade activities as tolerated     [x]  Continue plan of care 
[]  Update interventions per flow sheet      
[]  Discharge due to:_ 
[]  Other:_ Polo Roldan PTA 2/11/2019  8:40 AM 
 
Future Appointments Date Time Provider Tawana Grajeda 2/12/2019  3:30 PM George Clarke, PTA [facility]  
2/13/2019  3:30 PM Aimee Roldan, PTA [facility]  
2/14/2019  5:00 PM Culebra Buttery, PT [facility]  
2/15/2019  3:30 PM Culebra Buttery, PT [facility]  
2/18/2019 11:00 AM Aimee Roldan, PTA [facility]  
2/19/2019 11:30 AM George Clarke, PTA [facility]  
2/20/2019  2:30 PM Crystal Redo, PTA [facility]  
2/21/2019  3:30 PM Culebra Buttery, PT [facility]  
2/22/2019  2:30 PM Aimee Roldan, PTA [facility]  
2/25/2019  4:30 PM Fuentes Buttery, PT [facility]  
2/27/2019  4:30 PM Culebra Buttery, PT [facility]  
3/1/2019  4:00 PM Culebra Buttery, PT [facility]  
3/4/2019  3:30 PM Crystal Redo, PTA [facility]  
3/6/2019  4:30 PM Fuentes Buttery, PT [facility]  
3/8/2019  4:00 PM Culebra Buttery, PT [facility]  
3/11/2019  4:00 PM Culebra Buttery, PT [facility]  
3/13/2019  4:00 PM Culebra Buttery, PT [facility]  
3/15/2019  4:00 PM Culebra Buttery, PT [facility]  
3/18/2019  4:00 PM Culebra Buttery, PT [facility]  
3/20/2019  4:00 PM [facility] Lu [facility]  
3/22/2019  4:00 PM Fuentes Gonzales, NANCY [facility]  
 3/25/2019  4:00 PM Earma Dung, PT [facility]  
3/27/2019  4:00 PM Erlene Due, PTA [facility]  
3/29/2019  4:00 PM Earma Dung, PT [facility]  
4/1/2019  4:00 PM Earma Dung, PT [facility]  
4/3/2019  4:00 PM Earma Dung, PT [facility]  
4/5/2019  4:00 PM Earma Dung, PT [facility]

## 2019-02-12 ENCOUNTER — HOSPITAL ENCOUNTER (OUTPATIENT)
Dept: PHYSICAL THERAPY | Age: 64
Discharge: HOME | End: 2019-02-12
Payer: COMMERCIAL

## 2019-02-12 PROCEDURE — 97140 MANUAL THERAPY 1/> REGIONS: CPT

## 2019-02-12 PROCEDURE — 97110 THERAPEUTIC EXERCISES: CPT

## 2019-02-12 PROCEDURE — 97016 VASOPNEUMATIC DEVICE THERAPY: CPT

## 2019-02-12 NOTE — PROGRESS NOTES
PT DAILY TREATMENT NOTE 10-18 Patient Name: Hedy Oleary Date:2019 : 1955 [x]  Patient  Verified Payor: BLUE CROSS / Plan: [facility] PPO / Product Type: PPO / In time:3:30  Out time:4:17 Total Treatment Time (min): 47 Visit #: 3 of 24-40 Medicare/BCBS Only Total Timed Codes (min):  47 1:1 Treatment Time:  37 Treatment Area: Pain in left shoulder [M25.512] SUBJECTIVE Pain Level (0-10 scale): 5 Any medication changes, allergies to medications, adverse drug reactions, diagnosis change, or new procedure performed?: [x] No    [] Yes (see summary sheet for update) Subjective functional status/changes:   [] No changes reported Pt reports feeling sore yesterday but mainly because he went grocery shopping after PT OBJECTIVE Modality rationale: decrease inflammation and decrease pain to improve the patients ability to perform daily tasks Min Type Additional Details  
 [] Estim:  []Unatt       []IFC  []Premod []Other:  []w/ice   []w/heat Position: Location:  
 [] Estim: []Att    []TENS instruct  []NMES []Other:  []w/US   []w/ice   []w/heat Position: Location:  
 []  Traction: [] Cervical       []Lumbar 
                     [] Prone          []Supine []Intermittent   []Continuous Lbs: 
[] before manual 
[] after manual  
 []  Ultrasound: []Continuous   [] Pulsed []1MHz   []3MHz W/cm2: 
Location:  
 []  Iontophoresis with dexamethasone Location: [] Take home patch  
[] In clinic  
 []  Ice     []  heat 
[]  Ice massage 
[]  Laser  
[]  Anodyne Position: Location:  
 []  Laser with stim 
[]  Other:  Position: Location:  
10 [x]  Vasopneumatic Device Pressure:       [x] lo [] med [] hi  
Temperature: [x] lo [] med [] hi  
[] Skin assessment post-treatment:  []intact []redness- no adverse reaction 
  []redness  adverse reaction: 22 min Therapeutic Exercise:  [] See flow sheet :  
Rationale: increase ROM to improve the patients ability to perform ADLs when appropriate per protocol 15 min Manual Therapy:  PROM to left shoulder, STM to bicep Rationale: decrease pain, increase ROM and increase tissue extensibility to Improve ROM With 
 [] TE 
 [] TA 
 [] neuro 
 [] other: Patient Education: [x] Review HEP [] Progressed/Changed HEP based on:  
[] positioning   [] body mechanics   [] transfers   [] heat/ice application   
[] other:   
 
Other Objective/Functional Measures: PROM left shoulder flex 125* Pain Level (0-10 scale) post treatment: 2 
 
ASSESSMENT/Changes in Function: Noted contusion at bicep causing pain and discomfort with shoulder sling cushion. Demo'd improved PROM flex and abd. Decr'd reports of pain following treatment today. Patient will continue to benefit from skilled PT services to modify and progress therapeutic interventions, address functional mobility deficits, address ROM deficits, address strength deficits, analyze and address soft tissue restrictions, analyze and cue movement patterns, analyze and modify body mechanics/ergonomics and assess and modify postural abnormalities to attain remaining goals. []  See Plan of Care 
[]  See progress note/recertification 
[]  See Discharge Summary Progress towards goals / Updated goals: 
Short Term Goals: To be accomplished in 2 weeks: 
             1. Patient will be independent and compliant with HEP in order to improve functional mobility. Met- pt reports compliance with HEP              2. Patient will be able to decrease left shoulder pain to no more than 2/10 in order to improve ease of future ADLs. Long Term Goals: To be accomplished in 8 weeks: 
             1. Patient will be able to improve FOTO score to 59 in order to demonstrate improvements in functional independence.              2. Patient will be able to improve left shoulder PROM flexion to 120 degrees in order to improve ease of future ADLs.              3. Patient will be able to improve left shoulder PROM abduction to 100 degrees in order to improve functional mobility.              4. Patient will be able to improve left shoulder AROM flexion to 100 degrees (when able per protocol) in order to improve ease of reaching a shelf shoulder height.  PLAN [x]  Upgrade activities as tolerated     [x]  Continue plan of care 
[]  Update interventions per flow sheet      
[]  Discharge due to:_ 
[]  Other:_ Shona Canseco PTA 2/12/2019  2:59 PM 
 
Future Appointments Date Time Provider Tawana Nicci 2/12/2019  3:30 PM Kaveh Douglas, PTA [facility]  
2/13/2019  3:30 PM Aimee Roldan, PTA [facility]  
2/14/2019  5:00 PM Gian [place], PT [facility]  
2/15/2019  3:30 PM Gian [place], PT [facility]  
2/18/2019 11:00 AM Aimee Roldan, PTA [facility]  
2/19/2019 11:30 AM Kaveh Douglas, PTA [facility]  
2/20/2019  2:30 PM Alexa Valdez, PTA [facility]  
2/21/2019  3:30 PM Gian [place], PT [facility]  
2/22/2019  2:30 PM Aimee Roldan, PTA [facility]  
2/25/2019  4:30 PM Gian [place], PT [facility]  
2/27/2019  4:30 PM Gian [place], PT [facility]  
3/1/2019  4:00 PM [place], PT [facility]  
3/4/2019  3:30 PM Alexa Antes, PTA [facility]  
3/6/2019  4:30 PM [place], PT [facility]  
3/8/2019  4:00 PM [place], PT [facility]  
3/11/2019  4:00 PM [place], PT [facility]  
3/13/2019  4:00 PM [place], PT [facility]  
3/15/2019  4:00 PM Gian [place], PT [facility]  
3/18/2019  4:00 PM Gian [place], PT [facility]  
3/20/2019  4:00 PM Alexa Valdez PTA [facility]  
3/22/2019  4:00 PM [facility]  
 3/25/2019  4:00 PM Afia Lucas, PT [facility]  
3/27/2019  4:00 PM Moira Pederson, PTA [facility]  
3/29/2019  4:00 PM Afia Lucas, PT [facility]  
4/1/2019  4:00 PM Afia Lucas, PT [facility]  
4/3/2019  4:00 PM Afia Lucas, PT [facility]  
4/5/2019  4:00 PM Afia Lucas, PT [facility]

## 2019-02-13 ENCOUNTER — HOSPITAL ENCOUNTER (OUTPATIENT)
Dept: PHYSICAL THERAPY | Age: 64
Discharge: HOME | End: 2019-02-13
Payer: COMMERCIAL

## 2019-02-13 PROCEDURE — 97140 MANUAL THERAPY 1/> REGIONS: CPT

## 2019-02-13 PROCEDURE — 97016 VASOPNEUMATIC DEVICE THERAPY: CPT

## 2019-02-13 PROCEDURE — 97110 THERAPEUTIC EXERCISES: CPT

## 2019-02-13 NOTE — PROGRESS NOTES
PT DAILY TREATMENT NOTE 10-18 Patient Name: Rayshawn Suazo Date:2019 : 1955 [x]  Patient  Verified Payor: BLUE CROSS / Plan: [facility] PPO / Product Type: PPO / In time:3:31  Out time:4:22 Total Treatment Time (min): 51 Visit #: 4 of 24-40 Medicare/BCBS Only Total Timed Codes (min):  41 1:1 Treatment Time:  41  
 
 
Treatment Area: Pain in left shoulder [M25.512] SUBJECTIVE Pain Level (0-10 scale): 4 Any medication changes, allergies to medications, adverse drug reactions, diagnosis change, or new procedure performed?: [x] No    [] Yes (see summary sheet for update) Subjective functional status/changes:   [] No changes reported Pt reports being really sore after last visit, and is fatigued from working this morning. OBJECTIVE Modality rationale: decrease inflammation and decrease pain to improve the patients ability to perform daily tasks Type Additional Details  
[] Estim:  []Unatt       []IFC  []Premod []Other:  []w/ice   []w/heat Position: Location:  
[] Estim: []Att    []TENS instruct  []NMES []Other:  []w/US   []w/ice   []w/heat Position: Location:  
[]  Traction: [] Cervical       []Lumbar 
                     [] Prone          []Supine []Intermittent   []Continuous Lbs: 
[] before manual 
[] after manual  
[]  Ultrasound: []Continuous   [] Pulsed []1MHz   []3MHz W/cm2: 
Location:  
[]  Iontophoresis with dexamethasone Location: [] Take home patch  
[] In clinic  
[]  Ice     []  heat 
[]  Ice massage 
[]  Laser  
[]  Anodyne Position: Location:  
[]  Laser with stim 
[]  Other:  Position: Location:  
[x]  Vasopneumatic Device Pressure:       [x] lo [] med [] hi  
Temperature: [x] lo [] med [] hi  
[] Skin assessment post-treatment:  []intact []redness- no adverse reaction 
  []redness  adverse reaction: 26 min Therapeutic Exercise:  [x] See flow sheet :  
Rationale: increase ROM and increase strength to improve the patients ability to perform ADLs 15 min Manual Therapy:  PROM to left shoulder, STM to bicep Rationale: decrease pain, increase ROM and increase tissue extensibility to improved functional mobility With 
 [] TE 
 [] TA 
 [] neuro 
 [] other: Patient Education: [x] Review HEP [] Progressed/Changed HEP based on:  
[] positioning   [] body mechanics   [] transfers   [] heat/ice application   
[] other:   
 
Other Objective/Functional Measures:  
  
 
Pain Level (0-10 scale) post treatment: 2 
 
ASSESSMENT/Changes in Function: Pt is progressing well with PROM and reports compliance with HEP. Pt was encouraged to contact MD regarding yellow contusion and swelling around bicep and inf to axilla. Noted pressure marks from sling and tightness in the area. Pt had normal capillary refill and pulse rate. Patient will continue to benefit from skilled PT services to modify and progress therapeutic interventions, address functional mobility deficits and address ROM deficits to attain remaining goals. []  See Plan of Care 
[]  See progress note/recertification 
[]  See Discharge Summary Progress towards goals / Updated goals: 
Short Term Goals: To be accomplished in 2 weeks: 
             1. Patient will be independent and compliant with HEP in order to improve functional mobility. Met- pt reports compliance with HEP              2. Patient will be able to decrease left shoulder pain to no more than 2/10 in order to improve ease of future ADLs. Long Term Goals: To be accomplished in 8 weeks: 
             1. Patient will be able to improve FOTO score to 59 in order to demonstrate improvements in functional independence.  
             2. Patient will be able to improve left shoulder PROM flexion to 120 degrees in order to improve ease of future ADLs.              3. Patient will be able to improve left shoulder PROM abduction to 100 degrees in order to improve functional mobility.              4. Patient will be able to improve left shoulder AROM flexion to 100 degrees (when able per protocol) in order to improve ease of reaching a shelf shoulder height.  PLAN [x]  Upgrade activities as tolerated     [x]  Continue plan of care 
[]  Update interventions per flow sheet      
[]  Discharge due to:_ 
[]  Other:_ David Arenas PTA 2/13/2019  3:39 PM 
 
Future Appointments Date Time Provider Tawana Nicci 2/14/2019  5:00 PM Bettyeese Kocher, PT [facility]  
2/15/2019  3:30 PM Bettyeese Kocher, PT [facility]  
2/18/2019 11:00 AM Aimee Roldan, PTA [facility]  
2/19/2019 11:30 AM Aaliyah Lechuga, PTA [facility]  
2/20/2019  2:30 PM Fleet Brought, PTA [facility]  
2/21/2019  3:30 PM Bettyeese Kocher, PT [facility]  
2/22/2019  2:30 PM Aimee Roldan, PTA [facility]  
2/25/2019  4:30 PM Bettye Kocher, PT [facility]  
2/27/2019  4:30 PM Bettye Kocher, PT [facility]  
3/1/2019  4:00 PM Bettye Kocher, PT [facility]  
3/4/2019  3:30 PM Fleet Brought, PTA [facility]  
3/6/2019  4:30 PM Bettye Kocher, PT [facility]  
3/8/2019  4:00 PM Bettye Kocher, PT [facility]  
3/11/2019  4:00 PM Bettye Kocher, PT [facility]  
3/13/2019  4:00 PM Bettye Kocher, PT [facility]  
3/15/2019  4:00 PM Bettye Kocher, PT [facility]  
3/18/2019  4:00 PM Bettye Kocher, PT [facility]  
3/20/2019  4:00 PM Fleet Brought, PTA [facility]  
3/22/2019  4:00 PM Bettye Kocher, PT [facility]  
3/25/2019  4:00 PM Bettye Kocher, PT [facility]  
3/27/2019  4:00 PM Fleet Brought, PTA [facility]  
3/29/2019  4:00 PM [facility]  
4/1/2019  4:00 PM [facility]  
 4/3/2019  4:00 PM Angel Hernandez, PT [facility]  
4/5/2019  4:00 PM Angel Hernandez, PT [facility]

## 2019-02-14 ENCOUNTER — HOSPITAL ENCOUNTER (OUTPATIENT)
Dept: PHYSICAL THERAPY | Age: 64
Discharge: HOME | End: 2019-02-14
Payer: COMMERCIAL

## 2019-02-14 PROCEDURE — 97140 MANUAL THERAPY 1/> REGIONS: CPT

## 2019-02-14 PROCEDURE — 97110 THERAPEUTIC EXERCISES: CPT

## 2019-02-14 NOTE — PROGRESS NOTES
PT DAILY TREATMENT NOTE 10-18 Patient Name: Geoffrey Graves Date:2019 : 1955 [x]  Patient  Verified Payor: BLUE CROSS / Plan: [facility] PPO / Product Type: PPO / In time:4:48  Out time:5:30 Total Treatment Time (min): 42 Visit #: 5 of 24-40 Medicare/BCBS Only Total Timed Codes (min):  32 1:1 Treatment Time:  32 Treatment Area: Pain in left shoulder [M25.512] SUBJECTIVE Pain Level (0-10 scale): 3 Any medication changes, allergies to medications, adverse drug reactions, diagnosis change, or new procedure performed?: [x] No    [] Yes (see summary sheet for update) Subjective functional status/changes:   [] No changes reported Yesterday patient recommended by PT to contact MD regarding left arm abnormal bruising and swelling. PT spoke with PA at Dr. Elvira Carnes office as well and patient advised by Dr. Elvira Carnes office yesterday to go in for a doppler. Patient was found to have blood clots in left arm, called Dr. Elvira Carnes office this morning and MD cleared patient to continue PT and he would keep his follow up with Dr. rAmin Lund for 19. OBJECTIVE Modality rationale: decrease edema, decrease inflammation and decrease pain to improve the patients ability to perform ADLs with improved ease. Min Type Additional Details  
 [] Estim:  []Unatt       []IFC  []Premod []Other:  []w/ice   []w/heat Position: Location:  
 [] Estim: []Att    []TENS instruct  []NMES []Other:  []w/US   []w/ice   []w/heat Position: Location:  
 []  Traction: [] Cervical       []Lumbar 
                     [] Prone          []Supine []Intermittent   []Continuous Lbs: 
[] before manual 
[] after manual  
 []  Ultrasound: []Continuous   [] Pulsed []1MHz   []3MHz W/cm2: 
Location:  
 []  Iontophoresis with dexamethasone Location: [] Take home patch  
[] In clinic 10 [x]  Ice     []  heat 
[]  Ice massage 
[]  Laser  
[]  Anodyne Position: seated Location:left shoulder  
 []  Laser with stim 
[]  Other:  Position: Location:  
 []  Vasopneumatic Device Pressure:       [] lo [] med [] hi  
Temperature: [] lo [] med [] hi  
[] Skin assessment post-treatment:  []intact []redness- no adverse reaction 
  []redness  adverse reaction:  
 
 
17 min Therapeutic Exercise:  [x] See flow sheet :  
Rationale: increase ROM and increase strength to improve the patients ability to perform ADLs with improved ease. 15 min Manual Therapy:  PROM left elbow and shoulder Rationale: increase ROM and increase tissue extensibility to improve patients functional mobility. With 
 [] TE 
 [] TA 
 [] neuro 
 [] other: Patient Education: [x] Review HEP [] Progressed/Changed HEP based on:  
[] positioning   [] body mechanics   [] transfers   [] heat/ice application   
[] other:   
 
Other Objective/Functional Measures: observed improvements in left shoulder PROM. Pain Level (0-10 scale) post treatment: 2 
 
ASSESSMENT/Changes in Function: Patient continues to progress toward established goals. Patient states he feels more stiff today and he thinks it is because he did not sleep much last night. Patient continues to demonstrate improvements in PROM. Decreased swelling noted in left arm today. Patient will continue to benefit from skilled PT services to modify and progress therapeutic interventions, address functional mobility deficits, address ROM deficits, address strength deficits, analyze and address soft tissue restrictions, analyze and cue movement patterns, analyze and modify body mechanics/ergonomics and assess and modify postural abnormalities to attain remaining goals. [x]  See Plan of Care 
[]  See progress note/recertification 
[]  See Discharge Summary Progress towards goals / Updated goals: 
Short Term Goals: To be accomplished in 2 weeks:              1. Patient will be independent and compliant with HEP in order to improve functional mobility. Met- pt reports compliance with HEP              2. Patient will be able to decrease left shoulder pain to no more than 2/10 in order to improve ease of future ADLs. Progressing 2/14/19 - patient reports 3/10 pain in left shoulder Long Term Goals: To be accomplished in 8 weeks: 
             1. Patient will be able to improve FOTO score to 59 in order to demonstrate improvements in functional independence.  
             2. Patient will be able to improve left shoulder PROM flexion to 120 degrees in order to improve ease of future ADLs.              3. Patient will be able to improve left shoulder PROM abduction to 100 degrees in order to improve functional mobility.              4. Patient will be able to improve left shoulder AROM flexion to 100 degrees (when able per protocol) in order to improve ease of reaching a shelf shoulder height.  PLAN 
[]  Upgrade activities as tolerated     [x]  Continue plan of care 
[]  Update interventions per flow sheet      
[]  Discharge due to:_ 
[]  Other:_ Joon Richard PT 2/14/2019  5:19 PM 
 
Future Appointments Date Time Provider Tawana Grajeda 2/15/2019  3:30 PM Charley Mt, PT [facility]  
2/18/2019 11:00 AM Aimee Roldan, PTA [facility]  
2/19/2019 11:30 AM Daphne Duong, PTA [facility]  
2/20/2019  2:30 PM Stevenson Cortes, PTA [facility]  
2/21/2019  3:30 PM Charley Mt, PT [facility]  
2/22/2019  2:30 PM Daphne Duong, PTA [facility]  
2/25/2019  4:30 PM Osbaldoa Mt, PT [facility]  
2/27/2019  4:30 PM Osbaldoa Mt, PT [facility]  
3/1/2019  4:00 PM Osbaldoa Mt, PT [facility]  
3/4/2019  3:30 PM Stevenson Cortes, PTA [facility]  
3/6/2019  4:30 PM Osbaldoa Mt, PT [facility]  
3/8/2019  4:00 PM Osbaldoa Mt, PT [facility]  
 3/11/2019  4:00 PM Bobtown Rad A, PT [facility]  
3/13/2019  4:00 PM Donta Rad, PT [facility]  
3/15/2019  4:00 PM Bobtown Rad, PT [facility]  
3/18/2019  4:00 PM Donta Rad, PT [facility]  
3/20/2019  4:00 PM Virgin Cocks, PTA [facility]  
3/22/2019  4:00 PM Bobtown Rad, PT [facility]  
3/25/2019  4:00 PM Donta Rad, PT [facility]  
3/27/2019  4:00 PM Virgin Cocks, PTA [facility]  
3/29/2019  4:00 PM Bobtown Rad, PT [facility]  
4/1/2019  4:00 PM Donta Rad, PT [facility]  
4/3/2019  4:00 PM Bobtown Rad, PT [facility]  
4/5/2019  4:00 PM Donta Rad, PT [facility]

## 2019-02-15 ENCOUNTER — HOSPITAL ENCOUNTER (OUTPATIENT)
Dept: PHYSICAL THERAPY | Age: 64
Discharge: HOME | End: 2019-02-15
Payer: COMMERCIAL

## 2019-02-15 PROCEDURE — 97140 MANUAL THERAPY 1/> REGIONS: CPT

## 2019-02-15 PROCEDURE — 97110 THERAPEUTIC EXERCISES: CPT

## 2019-02-15 NOTE — PROGRESS NOTES
PT DAILY TREATMENT NOTE 10-18 Patient Name: Domingo Jackson Date:2/15/2019 : 1955 [x]  Patient  Verified Payor: BLUE CROSS / Plan: [facility] PPO / Product Type: PPO / In time:3:32  Out time:4:05 Total Treatment Time (min): 33 Visit #: 6 of 24-40 Medicare/BCBS Only Total Timed Codes (min):  23 1:1 Treatment Time:  21 Treatment Area: Pain in left shoulder [M25.512] SUBJECTIVE Pain Level (0-10 scale): 2 Any medication changes, allergies to medications, adverse drug reactions, diagnosis change, or new procedure performed?: [x] No    [] Yes (see summary sheet for update) Subjective functional status/changes:   [] No changes reported \"Feeling much better today, the swelling in my arm is going down a lot\". OBJECTIVE Modality rationale: decrease edema, decrease inflammation and decrease pain to improve the patients ability to perform ADLs with improved ease. Min Type Additional Details  
 [] Estim:  []Unatt       []IFC  []Premod []Other:  []w/ice   []w/heat Position: Location:  
 [] Estim: []Att    []TENS instruct  []NMES []Other:  []w/US   []w/ice   []w/heat Position: Location:  
 []  Traction: [] Cervical       []Lumbar 
                     [] Prone          []Supine []Intermittent   []Continuous Lbs: 
[] before manual 
[] after manual  
 []  Ultrasound: []Continuous   [] Pulsed []1MHz   []3MHz W/cm2: 
Location:  
 []  Iontophoresis with dexamethasone Location: [] Take home patch  
[] In clinic  
10 [x]  Ice     []  heat 
[]  Ice massage 
[]  Laser  
[]  Anodyne Position: seated Location: Left shoulder  
 []  Laser with stim 
[]  Other:  Position: Location:  
 []  Vasopneumatic Device Pressure:       [] lo [] med [] hi  
Temperature: [] lo [] med [] hi  
[] Skin assessment post-treatment:  []intact []redness- no adverse reaction []redness  adverse reaction:  
 
 
10 min Therapeutic Exercise:  [x] See flow sheet :  
Rationale: increase ROM and increase strength to improve the patients ability to perform ADLs with improved ease. 13 min Manual Therapy:  Left shoulder and elbow PROM Rationale: decrease pain, increase ROM and increase tissue extensibility to improve patients functional mobility. With 
 [] TE 
 [] TA 
 [] neuro 
 [] other: Patient Education: [x] Review HEP [] Progressed/Changed HEP based on:  
[] positioning   [] body mechanics   [] transfers   [] heat/ice application   
[] other:   
 
Other Objective/Functional Measures: Decreased edema noted in left arm.  
 
left shoulder PROM flexion  130 degrees Pain Level (0-10 scale) post treatment: 1 ASSESSMENT/Changes in Function: Patient continues to have yellowish bruising along medial left arm, decreased pain and edema noted. Patient with good tolerance to PROM, no increase in pain symptoms. Continue to progress left shoulder and elbow PROM. Patient will continue to benefit from skilled PT services to modify and progress therapeutic interventions, address functional mobility deficits, address ROM deficits, address strength deficits, analyze and address soft tissue restrictions, analyze and cue movement patterns, analyze and modify body mechanics/ergonomics and assess and modify postural abnormalities to attain remaining goals. [x]  See Plan of Care 
[]  See progress note/recertification 
[]  See Discharge Summary Progress towards goals / Updated goals: 
Short Term Goals: To be accomplished in 2 weeks: 
             1. Patient will be independent and compliant with HEP in order to improve functional mobility. Met- pt reports compliance with HEP              2. Patient will be able to decrease left shoulder pain to no more than 2/10 in order to improve ease of future ADLs.  Progressing 2/14/19 - patient reports 3/10 pain in left shoulder Long Term Goals: To be accomplished in 8 weeks: 
             1. Patient will be able to improve FOTO score to 59 in order to demonstrate improvements in functional independence.  
             2. Patient will be able to improve left shoulder PROM flexion to 120 degrees in order to improve ease of future ADLs. Goal met 2/15/19 - left shoulder PROM flexion  130 degrees              3. Patient will be able to improve left shoulder PROM abduction to 100 degrees in order to improve functional mobility.              4. Patient will be able to improve left shoulder AROM flexion to 100 degrees (when able per protocol) in order to improve ease of reaching a shelf shoulder height.  PLAN 
[]  Upgrade activities as tolerated     [x]  Continue plan of care 
[]  Update interventions per flow sheet      
[]  Discharge due to:_ 
[]  Other:_ Michela Vargas, NANCY 2/15/2019  3:39 PM 
 
Future Appointments Date Time Provider Tawana Grajeda 2/18/2019 11:00 AM Aimee Roldan, PTA [facility]  
2/19/2019 11:30 AM Asif Pulido, PTA [facility]  
2/20/2019  2:30 PM Arnetta January, PTA [facility]  
2/21/2019  3:30 PM Juvenalmegha Carrt, PT [facility]  
2/22/2019  2:30 PM Aimee Roldan, PTA [facility]  
2/25/2019  4:30 PM Juvenal Marivel, PT [facility]  
2/27/2019  4:30 PM Juvenal Carrt, PT [facility]  
3/1/2019  4:00 PM Juvenalmegha Carrt, PT [facility]  
3/4/2019  3:30 PM Delmy January, PTA [facility]  
3/6/2019  4:30 PM Juvenal Marivel, PT [facility]  
3/8/2019  4:00 PM Juvenal Marivel, PT [facility]  
3/11/2019  4:00 PM Juvenal Marivel, PT [facility]  
3/13/2019  4:00 PM Juvenal Marivel, PT [facility]  
3/15/2019  4:00 PM Juvenal Marivel, PT [facility]  
3/18/2019  4:00 PM Juvenalmegha Garciaquet, PT [facility]  
3/20/2019  4:00 PM [facility]  
 3/22/2019  4:00 PM Angel Mary, PT [facility]  
3/25/2019  4:00 PM Angel Mary, PT [facility]  
3/27/2019  4:00 PM Mayco Emerson, PTA [facility]  
3/29/2019  4:00 PM Angel Mary, PT [facility]  
4/1/2019  4:00 PM Angel Mary, PT [facility]  
4/3/2019  4:00 PM Angel Mary, PT [facility]  
4/5/2019  4:00 PM Angel Mary, PT [facility]

## 2019-02-18 ENCOUNTER — HOSPITAL ENCOUNTER (OUTPATIENT)
Dept: PHYSICAL THERAPY | Age: 64
Discharge: HOME | End: 2019-02-18
Payer: COMMERCIAL

## 2019-02-18 PROCEDURE — 97140 MANUAL THERAPY 1/> REGIONS: CPT

## 2019-02-18 PROCEDURE — 97110 THERAPEUTIC EXERCISES: CPT

## 2019-02-18 NOTE — PROGRESS NOTES
PT DAILY TREATMENT NOTE 10-18 Patient Name: Debbie Salazar Date:2019 : 1955 [x]  Patient  Verified Payor: BLUE CROSS / Plan: [facility] PPO / Product Type: PPO / In time:11:01  Out time:11:44 Total Treatment Time (min): 43 Visit #: 7 of 24-40 Medicare/BCBS Only Total Timed Codes (min):  33 1:1 Treatment Time:  35 Treatment Area: Pain in left shoulder [M25.512] SUBJECTIVE Pain Level (0-10 scale): 2 Any medication changes, allergies to medications, adverse drug reactions, diagnosis change, or new procedure performed?: [x] No    [] Yes (see summary sheet for update) Subjective functional status/changes:   [] No changes reported Pt reports not sleeping well over the weekend because he is having a difficult time finding a comfortable position with sling on OBJECTIVE Modality rationale: decrease inflammation and decrease pain to improve the patients ability to perform daily tasks Min Type Additional Details  
 [] Estim:  []Unatt       []IFC  []Premod []Other:  []w/ice   []w/heat Position: Location:  
 [] Estim: []Att    []TENS instruct  []NMES []Other:  []w/US   []w/ice   []w/heat Position: Location:  
 []  Traction: [] Cervical       []Lumbar 
                     [] Prone          []Supine []Intermittent   []Continuous Lbs: 
[] before manual 
[] after manual  
 []  Ultrasound: []Continuous   [] Pulsed []1MHz   []3MHz W/cm2: 
Location:  
 []  Iontophoresis with dexamethasone Location: [] Take home patch  
[] In clinic  
10 [x]  Ice     []  heat 
[]  Ice massage 
[]  Laser  
[]  Anodyne Position: seated Location:left shoulder  
 []  Laser with stim 
[]  Other:  Position: Location:  
 []  Vasopneumatic Device Pressure:       [] lo [] med [] hi  
Temperature: [] lo [] med [] hi  
[] Skin assessment post-treatment:  []intact []redness- no adverse reaction 
  []redness  adverse reaction:  
 
18 min Therapeutic Exercise:  [] See flow sheet :  
Rationale: increase ROM and increase strength to improve the patients ability to perform ADLs 15 min Manual Therapy:  PROM to left shoulder, STM/DTM to left UT/LS Rationale: decrease pain, increase ROM and increase tissue extensibility to improve functional mobility With 
 [] TE 
 [] TA 
 [] neuro 
 [] other: Patient Education: [x] Review HEP [] Progressed/Changed HEP based on:  
[] positioning   [] body mechanics   [] transfers   [] heat/ice application   
[] other:   
 
Other Objective/Functional Measures:   
 
Pain Level (0-10 scale) post treatment: 2-3 
 
ASSESSMENT/Changes in Function: Pt is progressing well with PROM per protocol. Noted decr'd ms tension following treatment today. Pt to f/u with MD tomorrow, 2/19/19 Patient will continue to benefit from skilled PT services to modify and progress therapeutic interventions, address functional mobility deficits, address ROM deficits, address strength deficits, analyze and address soft tissue restrictions, analyze and cue movement patterns, analyze and modify body mechanics/ergonomics and assess and modify postural abnormalities to attain remaining goals. []  See Plan of Care 
[]  See progress note/recertification 
[]  See Discharge Summary Progress towards goals / Updated goals: 
Short Term Goals: To be accomplished in 2 weeks: 
             1. Patient will be independent and compliant with HEP in order to improve functional mobility. Met- pt reports compliance with HEP              2. Patient will be able to decrease left shoulder pain to no more than 2/10 in order to improve ease of future ADLs. Progressing 2/14/19 - patient reports 3/10 pain in left shoulder Long Term Goals: To be accomplished in 8 weeks: 
             1.  Patient will be able to improve FOTO score to 59 in order to demonstrate improvements in functional independence.  
             2. Patient will be able to improve left shoulder PROM flexion to 120 degrees in order to improve ease of future ADLs. Goal met 2/15/19 - left shoulder PROM flexion  130 degrees              3. Patient will be able to improve left shoulder PROM abduction to 100 degrees in order to improve functional mobility.              4. Patient will be able to improve left shoulder AROM flexion to 100 degrees (when able per protocol) in order to improve ease of reaching a shelf shoulder height.  PLAN 
[]  Upgrade activities as tolerated     []  Continue plan of care 
[]  Update interventions per flow sheet      
[]  Discharge due to:_ 
[]  Other:_ Darcy Agustin, PTA 2/18/2019  11:38 AM 
 
Future Appointments Date Time Provider Tawana Grajeda 2/19/2019 11:30 AM Checo Hicks, PTA [facility]  
2/20/2019  2:30 PM Sindy Sepulveda, PTA [facility]  
2/21/2019  3:30 PM Almmaxe Bachelor, PT [facility]  
2/22/2019  2:30 PM Aimee Roldan, PTA [facility]  
2/25/2019  4:30 PM Almarie Bachelor, PT [facility]  
2/27/2019  4:30 PM Almarie Bachelor, PT [facility]  
3/1/2019  4:00 PM Almarie Bachelor, PT [facility]  
3/4/2019  3:30 PM Sinyd Sepulveda, PTA [facility]  
3/6/2019  4:30 PM Almarie Bachelor, PT [facility]  
3/8/2019  4:00 PM Almarie Bachelor, PT [facility]  
3/11/2019  4:00 PM Almarie Bachelor, PT [facility]  
3/13/2019  4:00 PM Almarie Bachelor, PT [facility]  
3/15/2019  4:00 PM Almarie Bachelor, PT [facility]  
3/18/2019  4:00 PM Almarie Bachelor, PT [facility]  
3/20/2019  4:00 PM Sindy Sepulveda, PTA [facility]  
3/22/2019  4:00 PM Almarie Bachelor, PT [facility]  
3/25/2019  4:00 PM Almarie Bachelor, PT [facility]  
3/27/2019  4:00 PM [facility]  
 3/29/2019  4:00 PM Berna Khalil, PT [facility]  
4/1/2019  4:00 PM Berna Khalil, PT [facility]  
4/3/2019  4:00 PM Berna Khalil, PT [facility]  
4/5/2019  4:00 PM Berna Khalil, PT [facility]

## 2019-02-19 ENCOUNTER — HOSPITAL ENCOUNTER (OUTPATIENT)
Dept: PHYSICAL THERAPY | Age: 64
Discharge: HOME | End: 2019-02-19
Payer: COMMERCIAL

## 2019-02-19 PROCEDURE — 97140 MANUAL THERAPY 1/> REGIONS: CPT

## 2019-02-19 PROCEDURE — 97110 THERAPEUTIC EXERCISES: CPT

## 2019-02-19 NOTE — PROGRESS NOTES
PT DAILY TREATMENT NOTE 10-18 Patient Name: Dallas Claire Date:2019 : 1955 [x]  Patient  Verified Payor: BLUE CROSS / Plan: [facility] PPO / Product Type: PPO / In time:11:30  Out time:12:14 Total Treatment Time (min): 44 Visit #: 8 of 24-40 Medicare/BCBS Only Total Timed Codes (min):  34 1:1 Treatment Time:  34 Treatment Area: Pain in left shoulder [M25.512] SUBJECTIVE Pain Level (0-10 scale): 4.5 Any medication changes, allergies to medications, adverse drug reactions, diagnosis change, or new procedure performed?: [x] No    [] Yes (see summary sheet for update) Subjective functional status/changes:   [] No changes reported Pt reports shoulder feels very sore and tight OBJECTIVE Modality rationale: decrease inflammation and decrease pain to improve the patients ability to perform daily tasks Min Type Additional Details  
 [] Estim:  []Unatt       []IFC  []Premod []Other:  []w/ice   []w/heat Position: Location:  
 [] Estim: []Att    []TENS instruct  []NMES []Other:  []w/US   []w/ice   []w/heat Position: Location:  
 []  Traction: [] Cervical       []Lumbar 
                     [] Prone          []Supine []Intermittent   []Continuous Lbs: 
[] before manual 
[] after manual  
 []  Ultrasound: []Continuous   [] Pulsed []1MHz   []3MHz W/cm2: 
Location:  
 []  Iontophoresis with dexamethasone Location: [] Take home patch  
[] In clinic  
10 [x]  Ice     []  heat 
[]  Ice massage 
[]  Laser  
[]  Anodyne Position: seated Location: left shoulder  
 []  Laser with stim 
[]  Other:  Position: Location:  
 []  Vasopneumatic Device Pressure:       [] lo [] med [] hi  
Temperature: [] lo [] med [] hi  
[] Skin assessment post-treatment:  []intact []redness- no adverse reaction 
  []redness  adverse reaction: 24 min Therapeutic Exercise:  [] See flow sheet :  
Rationale: increase ROM and increase strength to improve the patients ability to perform ADLs when appropriate per protocol 10 min Manual Therapy:  PROM to left shoulder, STM/DTM to left UT/LS and infra Rationale: decrease pain, increase ROM and increase tissue extensibility to improve functional mobility With 
 [] TE 
 [] TA 
 [] neuro 
 [] other: Patient Education: [x] Review HEP [] Progressed/Changed HEP based on:  
[] positioning   [] body mechanics   [] transfers   [] heat/ice application   
[] other:   
 
Other Objective/Functional Measures:  
 
 
Pain Level (0-10 scale) post treatment: 3 
 
ASSESSMENT/Changes in Function: Pt cont's to progress well with PROM phase of protocol. Reports of ms tension decr'd following treatment today. Pt notes intermittent numbness and tingling into left hand. Patient will continue to benefit from skilled PT services to modify and progress therapeutic interventions, address functional mobility deficits, address ROM deficits, address strength deficits, analyze and address soft tissue restrictions, analyze and cue movement patterns, analyze and modify body mechanics/ergonomics and assess and modify postural abnormalities to attain remaining goals. []  See Plan of Care 
[]  See progress note/recertification 
[]  See Discharge Summary Progress towards goals / Updated goals: 
Short Term Goals: To be accomplished in 2 weeks: 
             1. Patient will be independent and compliant with HEP in order to improve functional mobility. Met- pt reports compliance with HEP              2. Patient will be able to decrease left shoulder pain to no more than 2/10 in order to improve ease of future ADLs. Progressing 2/14/19 - patient reports 3/10 pain in left shoulder Long Term Goals: To be accomplished in 8 weeks: 
             1.  Patient will be able to improve FOTO score to 59 in order to demonstrate improvements in functional independence.  
             2. Patient will be able to improve left shoulder PROM flexion to 120 degrees in order to improve ease of future ADLs. Goal met 2/15/19 - left shoulder PROM flexion  130 degrees  
             3. Patient will be able to improve left shoulder PROM abduction to 100 degrees in order to improve functional mobility.              4. Patient will be able to improve left shoulder AROM flexion to 100 degrees (when able per protocol) in order to improve ease of reaching a shelf shoulder height. PLAN [x]  Upgrade activities as tolerated     [x]  Continue plan of care 
[]  Update interventions per flow sheet      
[]  Discharge due to:_ 
[]  Other:_ Kell Hebert, PTA 2/19/2019  12:06 PM 
 
Future Appointments Date Time Provider Tawana Grajeda 2/20/2019  2:30 PM Alissa Vincent, PTA [facility]  
2/21/2019  3:30 PM Berna Khalil, PT [facility]  
2/22/2019  2:30 PM Aimee Roldan, PTA [facility]  
2/25/2019  4:30 PM Berna Khalil, PT [facility]  
2/27/2019  4:30 PM Berna Khalil, PT [facility]  
3/1/2019  4:00 PM Berna Khalil, PT [facility]  
3/4/2019  3:30 PM Alissa Vincent, PTA [facility]  
3/6/2019  4:30 PM Berna Khalil, PT [facility]  
3/8/2019  4:00 PM Berna Khalil, PT [facility]  
3/11/2019  4:00 PM Berna Khalil, PT [facility]  
3/13/2019  4:00 PM Keshiaeanahid Khalil, PT [facility]  
3/15/2019  4:00 PM Loreatha Remi, PT [facility]  
3/18/2019  4:00 PM Keshiaearegisa Remi, PT [facility]  
3/20/2019  4:00 PM Alissa Vincent, PTA [facility]  
3/22/2019  4:00 PM Berna Khalil, PT [facility]  
3/25/2019  4:00 PM Berna Khalil, PT [facility]  
3/27/2019  4:00 PM Alissa Bautistaa, PTA [facility]  
3/29/2019  4:00 PM [facility]  
 4/1/2019  4:00 PM Jada Reeves, PT [facility]  
4/3/2019  4:00 PM Jada Reeves, PT [facility]  
4/5/2019  4:00 PM Jada Reeves, PT [facility]

## 2019-02-20 ENCOUNTER — HOSPITAL ENCOUNTER (OUTPATIENT)
Dept: PHYSICAL THERAPY | Age: 64
Discharge: HOME | End: 2019-02-20
Payer: COMMERCIAL

## 2019-02-20 PROCEDURE — 97110 THERAPEUTIC EXERCISES: CPT

## 2019-02-20 PROCEDURE — 97140 MANUAL THERAPY 1/> REGIONS: CPT

## 2019-02-20 NOTE — PROGRESS NOTES
PT DAILY TREATMENT NOTE 10-18 Patient Name: Jeanmarie Torres Date:2019 : 1955 [x]  Patient  Verified Payor: BLUE CROSS / Plan: [facility] PPO / Product Type: PPO / In time:2:30  Out time:3:03 Total Treatment Time (min): 33 Visit #: 9 of 24-40 Medicare/BCBS Only Total Timed Codes (min):  23 1:1 Treatment Time:  21 Treatment Area: Pain in left shoulder [M25.512] SUBJECTIVE Pain Level (0-10 scale): 5/10 Any medication changes, allergies to medications, adverse drug reactions, diagnosis change, or new procedure performed?: [x] No    [] Yes (see summary sheet for update) Subjective functional status/changes:   [] No changes reported Lilli Morin took the wedge from me yesterday, slept with the arm closer to me and it's sore on top. \" OBJECTIVE Modality rationale: decrease inflammation and decrease pain to improve the patients ability to perform ADL's. Min Type Additional Details  
 [] Estim:  []Unatt       []IFC  []Premod []Other:  []w/ice   []w/heat Position: Location:  
 [] Estim: []Att    []TENS instruct  []NMES []Other:  []w/US   []w/ice   []w/heat Position: Location:  
 []  Traction: [] Cervical       []Lumbar 
                     [] Prone          []Supine []Intermittent   []Continuous Lbs: 
[] before manual 
[] after manual  
 []  Ultrasound: []Continuous   [] Pulsed []1MHz   []3MHz W/cm2: 
Location:  
 []  Iontophoresis with dexamethasone Location: [] Take home patch  
[] In clinic  
10 [x]  Ice     []  heat 
[]  Ice massage 
[]  Laser  
[]  Anodyne Position: Seated Location: Left shoulder  
 []  Laser with stim 
[]  Other:  Position: Location:  
 []  Vasopneumatic Device Pressure:       [] lo [] med [] hi  
Temperature: [] lo [] med [] hi  
[] Skin assessment post-treatment:  []intact []redness- no adverse reaction 
  []redness  adverse reaction: 15 min Therapeutic Exercise:  [x] See flow sheet :  
Rationale: increase ROM, increase strength and increase proprioception to improve the patients ability to perform self care activities. 8 min Manual Therapy:  Left ST/GH joint mobs grade II. Left Shoulder and elbow PROM per protocol. Rationale: decrease pain, increase ROM and increase tissue extensibility to prepare for AAROM phase of protocol. With 
 [x] TE 
 [] TA 
 [] neuro 
 [] other: Patient Education: [x] Review HEP [] Progressed/Changed HEP based on:  
[] positioning   [] body mechanics   [] transfers   [] heat/ice application   
[] other:   
 
Other Objective/Functional Measures: PROM Left shoulder abd 115 degrees. Pain Level (0-10 scale) post treatment: 2-3/10 ASSESSMENT/Changes in Function: PROM progressing well. Increased soreness/pain today secondary to first 24 hours without bolster. Patient will continue to benefit from skilled PT services to modify and progress therapeutic interventions, address functional mobility deficits, address ROM deficits, analyze and address soft tissue restrictions and analyze and cue movement patterns to attain remaining goals. [x]  See Plan of Care 
[]  See progress note/recertification 
[]  See Discharge Summary Progress towards goals / Updated goals: 
Short Term Goals: To be accomplished in 2 weeks: 
             1. Patient will be independent and compliant with HEP in order to improve functional mobility. Met- pt reports compliance with HEP              2. Patient will be able to decrease left shoulder pain to no more than 2/10 in order to improve ease of future ADLs. Progressing 2/14/19 - patient reports 3/10 pain in left shoulder Long Term Goals: To be accomplished in 8 weeks: 
             1. Patient will be able to improve FOTO score to 59 in order to demonstrate improvements in functional independence.              2. Patient will be able to improve left shoulder PROM flexion to 120 degrees in order to improve ease of future ADLs. Goal met 2/15/19 - left shoulder PROM flexion  130 degrees  
             3. Patient will be able to improve left shoulder PROM abduction to 100 degrees in order to improve functional mobility. - PROM Left shoulder abd 115 degrees. 2/20/2019 
             4. Patient will be able to improve left shoulder AROM flexion to 100 degrees (when able per protocol) in order to improve ease of reaching a shelf shoulder height. PLAN 
[]  Upgrade activities as tolerated     [x]  Continue plan of care 
[]  Update interventions per flow sheet      
[]  Discharge due to:_ 
[]  Other:_   
 
Monae Mann, PTA 2/20/2019  2:30 PM 
 
Future Appointments Date Time Provider Tawana Grajeda 2/21/2019  3:30 PM Fuentes Buttery, PT [facility]  
2/22/2019  2:30 PM Aimee Roldan, PTA [facility]  
2/25/2019  4:30 PM Stanville Buttery, PT [facility]  
2/27/2019  4:30 PM Stan[place] Buttery, PT [facility]  
3/1/2019  4:00 PM Stanville Buttery, PT [facility]  
3/4/2019  3:30 PM Crystal Redo, PTA [facility]  
3/6/2019  4:30 PM Fuentes Buttery, PT [facility]  
3/8/2019  4:00 PM Stanville Buttery, PT [facility]  
3/11/2019  4:00 PM Stanville Buttery, PT [facility]  
3/13/2019  4:00 PM Fuentes Buttery, PT [facility]  
3/15/2019  4:00 PM Stanville Buttery, PT [facility]  
3/18/2019  4:00 PM Fuentes Buttery, PT [facility]  
3/20/2019  4:00 PM Crystal Redo, PTA [facility]  
3/22/2019  4:00 PM Fuentes Buttery, PT [facility]  
3/25/2019  4:00 PM Fuentes Buttery, PT [facility]  
3/27/2019  4:00 PM Crystal Redo, PTA [facility]  
3/29/2019  4:00 PM Stanville Buttery, PT [facility]  
4/1/2019  4:00 PM Fuentes Buttery, PT [facility]  
 4/3/2019  4:00 PM Robin Butler, PT [facility]  
4/5/2019  4:00 PM Robin Butler, PT [facility]

## 2019-02-21 ENCOUNTER — HOSPITAL ENCOUNTER (OUTPATIENT)
Dept: PHYSICAL THERAPY | Age: 64
Discharge: HOME | End: 2019-02-21
Payer: COMMERCIAL

## 2019-02-21 PROCEDURE — 97140 MANUAL THERAPY 1/> REGIONS: CPT

## 2019-02-21 PROCEDURE — 97110 THERAPEUTIC EXERCISES: CPT

## 2019-02-21 NOTE — PROGRESS NOTES
PT DAILY TREATMENT NOTE 10-18 Patient Name: Delaney Dawson Date:2019 : 1955 [x]  Patient  Verified Payor: BLUE CROSS / Plan: [facility] PPO / Product Type: PPO / In time:3:30  Out time:4:09 Total Treatment Time (min): 39 Visit #: 10 of 24-40 Medicare/BCBS Only Total Timed Codes (min):  29 1:1 Treatment Time:  24  
 
 
Treatment Area: Pain in left shoulder [M25.512] SUBJECTIVE Pain Level (0-10 scale): 4 Any medication changes, allergies to medications, adverse drug reactions, diagnosis change, or new procedure performed?: [x] No    [] Yes (see summary sheet for update) Subjective functional status/changes:   [] No changes reported \"I am fair today\". OBJECTIVE Modality rationale: decrease edema, decrease inflammation and decrease pain to improve the patients ability to perform future task with improved ease. Min Type Additional Details  
 [] Estim:  []Unatt       []IFC  []Premod []Other:  []w/ice   []w/heat Position: Location:  
 [] Estim: []Att    []TENS instruct  []NMES []Other:  []w/US   []w/ice   []w/heat Position: Location:  
 []  Traction: [] Cervical       []Lumbar 
                     [] Prone          []Supine []Intermittent   []Continuous Lbs: 
[] before manual 
[] after manual  
 []  Ultrasound: []Continuous   [] Pulsed []1MHz   []3MHz W/cm2: 
Location:  
 []  Iontophoresis with dexamethasone Location: [] Take home patch  
[] In clinic  
10 [x]  Ice     []  heat 
[]  Ice massage 
[]  Laser  
[]  Anodyne Position: seated Location:left shoulder  
 []  Laser with stim 
[]  Other:  Position: Location:  
 []  Vasopneumatic Device Pressure:       [] lo [] med [] hi  
Temperature: [] lo [] med [] hi  
[] Skin assessment post-treatment:  []intact []redness- no adverse reaction 
  []redness  adverse reaction: 19 min Therapeutic Exercise:  [x] See flow sheet :  
Rationale: increase ROM and increase strength to improve the patients ability to perform future task. 10 min Manual Therapy:  Left shoulder and elbow PROM, gentle GH joint mobs and ST joint mobs Rationale: decrease pain, increase ROM and increase tissue extensibility to improve patients functional mobility. With 
 [] TE 
 [] TA 
 [] neuro 
 [] other: Patient Education: [x] Review HEP [] Progressed/Changed HEP based on:  
[] positioning   [] body mechanics   [] transfers   [] heat/ice application   
[] other:   
 
Other Objective/Functional Measures: Added C/S AROM Pain Level (0-10 scale) post treatment: 2.5 ASSESSMENT/Changes in Function: Decreased pain post session. Patient progressing well with PROM and activity tolerance. Plan to continue progressing PROM and plan to go to 3x/week next week. Patient will continue to benefit from skilled PT services to modify and progress therapeutic interventions, address functional mobility deficits, address ROM deficits, address strength deficits, analyze and address soft tissue restrictions, analyze and cue movement patterns, analyze and modify body mechanics/ergonomics and assess and modify postural abnormalities to attain remaining goals. [x]  See Plan of Care 
[]  See progress note/recertification 
[]  See Discharge Summary Progress towards goals / Updated goals: 
Short Term Goals: To be accomplished in 2 weeks: 
             1. Patient will be independent and compliant with HEP in order to improve functional mobility. Met- pt reports compliance with HEP              2. Patient will be able to decrease left shoulder pain to no more than 2/10 in order to improve ease of future ADLs. Progressing 2/14/19 - patient reports 3/10 pain in left shoulder Long Term Goals: To be accomplished in 8 weeks: 
             1.  Patient will be able to improve FOTO score to 59 in order to demonstrate improvements in functional independence.  
             2. Patient will be able to improve left shoulder PROM flexion to 120 degrees in order to improve ease of future ADLs. Goal met 2/15/19 - left shoulder PROM flexion  130 degrees  
             3. Patient will be able to improve left shoulder PROM abduction to 100 degrees in order to improve functional mobility. - PROM Left shoulder abd 115 degrees. 2/20/2019 
             4. Patient will be able to improve left shoulder AROM flexion to 100 degrees (when able per protocol) in order to improve ease of reaching a shelf shoulder height. Will assess when able per protocol PLAN 
[]  Upgrade activities as tolerated     [x]  Continue plan of care 
[]  Update interventions per flow sheet      
[]  Discharge due to:_ 
[]  Other:_ Chelle Preciado, PT 2/21/2019  3:35 PM 
 
Future Appointments Date Time Provider Tawana Grajeda 2/22/2019  2:30 PM Aimee Roldan, PTA [facility]  
2/25/2019  4:30 PM Beltre Bun, PT [facility]  
2/27/2019  4:30 PM Beltre Bun, PT [facility]  
3/1/2019  4:00 PM Beltre Bun, PT [facility]  
3/4/2019  3:30 PM Daphney Louise, PTA [facility]  
3/6/2019  4:30 PM Beltre Bun, PT [facility]  
3/8/2019  4:00 PM Beltre Bun, PT [facility]  
3/11/2019  4:00 PM Beltre Bun, PT [facility]  
3/13/2019  4:00 PM Beltre Bun, PT [facility]  
3/15/2019  4:00 PM Beltre Bun, PT [facility]  
3/18/2019  4:00 PM Beltre Bun, PT [facility]  
3/20/2019  4:00 PM Daphney Louise, PTA [facility]  
3/22/2019  4:00 PM Beltre Bun, PT [facility]  
3/25/2019  4:00 PM Beltre Bun, PT [facility]  
3/27/2019  4:00 PM Daphney Louise, PTA [facility]  
3/29/2019  4:00 PM Beltre Bun, PT [facility]  
4/1/2019  4:00 PM Beltre Bun, PT [facility]  
 4/3/2019  4:00 PM Juanito Mcdonald, PT [facility]  
4/5/2019  4:00 PM NANC[facility]

## 2019-02-21 NOTE — PROGRESS NOTES
In [address]  [address] [place], [address] 
[phone] fax Physical Therapy Progress Note Patient name: Hal Schumacher Start of Care: 2019 Referral source: Blanca George MD : 1955 Medical Diagnosis: Pain in left shoulder [M25.512] Payor: BLUE [place] / Plan: [facility] PPO / Product Type: PPO /  Onset Date: DOS: 19 Treatment Diagnosis: Left subscapularis repair, bicep tenodesis, SAD, and capsular release with manipulation Prior Hospitalization: see medical history Provider#: [id number] Medications: Verified on Patient summary List  
 Comorbidities: none reported Prior Level of Function: (I) with all ADLs and recreational activities Visits from Start of Care: 10    Missed Visits: 0 Key Functional Changes: Patient is progressing well per protocol, with improved tolerance to PROM. Decreased muscle guarding during PROM. Patient has been monitored closely due to a blood clot recently discovered in left UE. Short Term Goals: To be accomplished in 2 weeks: 
             1. Patient will be independent and compliant with HEP in order to improve functional mobility. Met- pt reports compliance with HEP              2. Patient will be able to decrease left shoulder pain to no more than 2/10 in order to improve ease of future ADLs. Progressing 19 - patient reports 3/10 pain in left shoulder Long Term Goals: To be accomplished in 8 weeks: 
             1. Patient will be able to improve FOTO score to 59 in order to demonstrate improvements in functional independence.  
             2. Patient will be able to improve left shoulder PROM flexion to 120 degrees in order to improve ease of future ADLs.  Goal met 2/15/19 - left shoulder PROM flexion  130 degrees  
             3. Patient will be able to improve left shoulder PROM abduction to 100 degrees in order to improve functional mobility. - PROM Left shoulder abd 115 degrees. 2/20/2019 
             4. Patient will be able to improve left shoulder AROM flexion to 100 degrees (when able per protocol) in order to improve ease of reaching a shelf shoulder height. Will assess when able per protocol Updated Goals: to be achieved in 4 weeks: 1. Patient will be able to improve FOTO score to 59 in order to demonstrate improvements in functional independence. 2. Patient will be able to progress to AAROM phase void of pain in order to improve functional mobility. 3. Patient will be able to improve left shoulder PROM flexion to 160 degrees in order to improve functional mobility. 4.  Patient will be able to decrease left shoulder pain to no more than 2/10 in order to improve ease of future ADLs. Progressing 2/14/19 - patient reports 3/10 pain in left shoulder 5. Patient will be able to improve left shoulder AROM flexion to 100 degrees (when able per protocol) in order to improve ease of reaching a shelf shoulder height. Will assess when able per protocol ASSESSMENT/RECOMMENDATIONS: Decreased pain post session. Patient progressing well with PROM and activity tolerance. Plan to continue progressing PROM and plan to go to 3x/week next week. [x]Continue therapy per initial plan/protocol at a frequency of  2-3 x per week for 4 weeks []Continue therapy with the following recommended changes:_____________________      _____________________________________________________________________ []Discontinue therapy progressing towards or have reached established goals []Discontinue therapy due to lack of appreciable progress towards goals []Discontinue therapy due to lack of attendance or compliance []Await Physician's recommendations/decisions regarding therapy []Other:________________________________________________________________ Thank you for this referral.   
 Selin Diallo, PT 2/21/2019 3:38 PM 
NOTE TO PHYSICIAN:  PLEASE COMPLETE THE ORDERS BELOW AND  
FAX TO [facility] Physical Therapy: [phone] If you are unable to process this request in 24 hours please contact our office: [phone] ? I have read the above report and request that my patient continue as recommended. ? I have read the above report and request that my patient continue therapy with the following changes/special instructions:__________________________________________________________ ? I have read the above report and request that my patient be discharged from therapy. Physicians signature: ______________________________Date: ______Time:______

## 2019-02-22 ENCOUNTER — HOSPITAL ENCOUNTER (OUTPATIENT)
Dept: PHYSICAL THERAPY | Age: 64
Discharge: HOME | End: 2019-02-22
Payer: COMMERCIAL

## 2019-02-22 PROCEDURE — 97110 THERAPEUTIC EXERCISES: CPT

## 2019-02-22 PROCEDURE — 97140 MANUAL THERAPY 1/> REGIONS: CPT

## 2019-02-22 NOTE — PROGRESS NOTES
PT DAILY TREATMENT NOTE 10-18 Patient Name: Edward Valenzuela Date:2019 : 1955 [x]  Patient  Verified Payor: BLUE CROSS / Plan: [facility] PPO / Product Type: PPO / In time:2:30  Out time:3:18 Total Treatment Time (min): 48 Visit #: 77 of 24-40 Medicare/BCBS Only Total Timed Codes (min):  38 1:1 Treatment Time:  23 Treatment Area: Pain in left shoulder [M25.512] SUBJECTIVE Pain Level (0-10 scale): 5 Any medication changes, allergies to medications, adverse drug reactions, diagnosis change, or new procedure performed?: [x] No    [] Yes (see summary sheet for update) Subjective functional status/changes:   [] No changes reported Pt reports shoulder feels tight OBJECTIVE Modality rationale: decrease inflammation and decrease pain to improve the patients ability to perform daily tasks Min Type Additional Details  
 [] Estim:  []Unatt       []IFC  []Premod []Other:  []w/ice   []w/heat Position: Location:  
 [] Estim: []Att    []TENS instruct  []NMES []Other:  []w/US   []w/ice   []w/heat Position: Location:  
 []  Traction: [] Cervical       []Lumbar 
                     [] Prone          []Supine []Intermittent   []Continuous Lbs: 
[] before manual 
[] after manual  
 []  Ultrasound: []Continuous   [] Pulsed []1MHz   []3MHz W/cm2: 
Location:  
 []  Iontophoresis with dexamethasone Location: [] Take home patch  
[] In clinic  
10 [x]  Ice     []  heat 
[]  Ice massage 
[]  Laser  
[]  Anodyne Position: seated Location:left shoulder  
 []  Laser with stim 
[]  Other:  Position: Location:  
 []  Vasopneumatic Device Pressure:       [] lo [] med [] hi  
Temperature: [] lo [] med [] hi  
[] Skin assessment post-treatment:  []intact []redness- no adverse reaction 
  []redness  adverse reaction:  
 
28 min Therapeutic Exercise:  [] See flow sheet :  
 Rationale: increase ROM and increase strength to improve the patients ability to perform ADLs 10 min Manual Therapy:  Left shoulder PROM, gentle post/inf GH jt mobs, scap mobs with STM to UT/LS Rationale: decrease pain, increase ROM and increase tissue extensibility to improve functional mobility With 
 [] TE 
 [] TA 
 [] neuro 
 [] other: Patient Education: [x] Review HEP [] Progressed/Changed HEP based on:  
[] positioning   [] body mechanics   [] transfers   [] heat/ice application   
[] other:   
 
Other Objective/Functional Measures:  
  
 
Pain Level (0-10 scale) post treatment: 2 
 
ASSESSMENT/Changes in Function: Noted decr'd ms tension and improved PROM following scap mobs and STM. Pt is progressing well per protocol and will transition to 3x/wk visits next week. Patient will continue to benefit from skilled PT services to modify and progress therapeutic interventions, address functional mobility deficits, address ROM deficits, address strength deficits, analyze and address soft tissue restrictions, analyze and cue movement patterns, analyze and modify body mechanics/ergonomics and assess and modify postural abnormalities to attain remaining goals. []  See Plan of Care 
[]  See progress note/recertification 
[]  See Discharge Summary Progress towards goals / Updated goals: 
Short Term Goals: To be accomplished in 2 weeks: 
             1. Patient will be independent and compliant with HEP in order to improve functional mobility. Met- pt reports compliance with HEP              2. Patient will be able to decrease left shoulder pain to no more than 2/10 in order to improve ease of future ADLs. Progressing 2/14/19 - patient reports 3/10 pain in left shoulder Long Term Goals: To be accomplished in 8 weeks: 
             1. Patient will be able to improve FOTO score to 59 in order to demonstrate improvements in functional independence.              2. Patient will be able to improve left shoulder PROM flexion to 120 degrees in order to improve ease of future ADLs. Goal met 2/15/19 - left shoulder PROM flexion  130 degrees  
             3. Patient will be able to improve left shoulder PROM abduction to 100 degrees in order to improve functional mobility. - PROM Left shoulder abd 115 degrees. 2/20/2019 
             4. Patient will be able to improve left shoulder AROM flexion to 100 degrees (when able per protocol) in order to improve ease of reaching a shelf shoulder height. Will assess when able per protocol  
  
 
PLAN [x]  Upgrade activities as tolerated     [x]  Continue plan of care 
[]  Update interventions per flow sheet      
[]  Discharge due to:_ 
[]  Other:_ Jeevan Roldan, PTA 2/22/2019  2:47 PM 
 
Future Appointments Date Time Provider Tawana Grajeda 2/25/2019  4:30 PM [place] Le[place], PT [facility]  
2/27/2019  4:30 PM [place] Leandro, PT [facility]  
3/1/2019  4:00 PM [place] Leandro, PT [facility]  
3/4/2019  3:30 PM Meka Maha, PTA [facility]  
3/6/2019  4:30 PM [place] Leandro, PT [facility]  
3/8/2019  4:00 PM [place] Leandro, PT [facility]  
3/11/2019  4:00 PM [place] Leandro, PT [facility]  
3/13/2019  4:00 PM [place] Leandro, PT [facility]  
3/15/2019  4:00 PM [place] Leandro, PT [facility]  
3/18/2019  4:00 PM [place] Leandro, PT [facility]  
3/20/2019  4:00 PM Meka Maha, PTA [facility]  
3/22/2019  4:00 PM [place] Leandro, PT [facility]  
3/25/2019  4:00 PM [place] Leandro, PT [facility]  
3/27/2019  4:00 PM Meka Maha, PTA [facility]  
3/29/2019  4:00 PM [place] Leandro, PT [facility]  
4/1/2019  4:00 PM [place] Leandro, PT [facility]  
4/3/2019  4:00 PM [place] Leandro, PT [facility]  
4/5/2019  4:00 PM [place] Leandro, PT [facility]

## 2019-02-25 ENCOUNTER — HOSPITAL ENCOUNTER (OUTPATIENT)
Dept: PHYSICAL THERAPY | Age: 64
Discharge: HOME | End: 2019-02-25
Payer: COMMERCIAL

## 2019-02-25 PROCEDURE — 97110 THERAPEUTIC EXERCISES: CPT

## 2019-02-25 PROCEDURE — 97140 MANUAL THERAPY 1/> REGIONS: CPT

## 2019-02-25 NOTE — PROGRESS NOTES
PT DAILY TREATMENT NOTE 10-18 Patient Name: Chika Knapp Date:2019 : 1955 [x]  Patient  Verified Payor: BLUE CROSS / Plan: [facility] PPO / Product Type: PPO / In time:4:30  Out time:5:10 Total Treatment Time (min): 40 Visit #: 12 of 24-40 Medicare/BCBS Only Total Timed Codes (min):  30 1:1 Treatment Time:  24  
 
 
Treatment Area: Pain in left shoulder [M25.512] SUBJECTIVE Pain Level (0-10 scale): 4.5 Any medication changes, allergies to medications, adverse drug reactions, diagnosis change, or new procedure performed?: [x] No    [] Yes (see summary sheet for update) Subjective functional status/changes:   [] No changes reported No new changes reported from patient. OBJECTIVE Modality rationale: decrease edema, decrease inflammation and decrease pain to improve the patients ability to perform future task with improved ease. Min Type Additional Details  
 [] Estim:  []Unatt       []IFC  []Premod []Other:  []w/ice   []w/heat Position: Location:  
 [] Estim: []Att    []TENS instruct  []NMES []Other:  []w/US   []w/ice   []w/heat Position: Location:  
 []  Traction: [] Cervical       []Lumbar 
                     [] Prone          []Supine []Intermittent   []Continuous Lbs: 
[] before manual 
[] after manual  
 []  Ultrasound: []Continuous   [] Pulsed []1MHz   []3MHz W/cm2: 
Location:  
 []  Iontophoresis with dexamethasone Location: [] Take home patch  
[] In clinic  
10 [x]  Ice     []  heat 
[]  Ice massage 
[]  Laser  
[]  Anodyne Position: seated Location: left shoulder   
 []  Laser with stim 
[]  Other:  Position: Location:  
 []  Vasopneumatic Device Pressure:       [] lo [] med [] hi  
Temperature: [] lo [] med [] hi  
[] Skin assessment post-treatment:  []intact []redness- no adverse reaction 
  []redness  adverse reaction: 20 min Therapeutic Exercise:  [x] See flow sheet :  
Rationale: increase ROM and increase strength to improve the patients ability to perform future ADLs with improved ease 10 min Manual Therapy:  PROM left shoulder and elbow, gentle GH joint mobs and scap mobs Rationale: decrease pain, increase ROM and increase tissue extensibility to improve patients functional mobility. With 
 [] TE 
 [] TA 
 [] neuro 
 [] other: Patient Education: [x] Review HEP [] Progressed/Changed HEP based on:  
[] positioning   [] body mechanics   [] transfers   [] heat/ice application   
[] other:   
 
Other Objective/Functional Measures:   
 
Pain Level (0-10 scale) post treatment: 2 
 
ASSESSMENT/Changes in Function: Decreased pain levels post session. Improved tolerance to PROM. Plan to continue progressing PROM per protocol. Patient will continue to benefit from skilled PT services to modify and progress therapeutic interventions, address functional mobility deficits, address ROM deficits, address strength deficits, analyze and address soft tissue restrictions, analyze and cue movement patterns, analyze and modify body mechanics/ergonomics and assess and modify postural abnormalities to attain remaining goals. [x]  See Plan of Care 
[]  See progress note/recertification 
[]  See Discharge Summary Progress towards goals / Updated goals: 1. Patient will be able to improve FOTO score to 59 in order to demonstrate improvements in functional independence. 2. Patient will be able to progress to AAROM phase void of pain in order to improve functional mobility. 3. Patient will be able to improve left shoulder PROM flexion to 160 degrees in order to improve functional mobility. 4.  Patient will be able to decrease left shoulder pain to no more than 2/10 in order to improve ease of future ADLs. Progressing 2/14/19 - patient reports 3/10 pain in left shoulder 5.Patient will be able to improve left shoulder AROM flexion to 100 degrees (when able per protocol) in order to improve ease of reaching a shelf shoulder height. Will assess when able per protocol    
 
 
PLAN 
[]  Upgrade activities as tolerated     [x]  Continue plan of care 
[]  Update interventions per flow sheet      
[]  Discharge due to:_ 
[]  Other:_ Jyoti Green, PT 2/25/2019  4:32 PM 
 
Future Appointments Date Time Provider Tawana Grajeda 2/27/2019  4:30 PM Azeem Ing, PT [facility]  
3/1/2019  4:00 PM Azeem Ing, PT [facility]  
3/4/2019  3:30 PM Clement Moore, PTA [facility]  
3/6/2019  4:30 PM Azeem Ing, PT [facility]  
3/8/2019  4:00 PM Azeem Ing, PT [facility]  
3/11/2019  4:00 PM Azeem Ing, PT [facility]  
3/13/2019  4:00 PM Azeem Ing, PT [facility]  
3/15/2019  4:00 PM Azeem Ing, PT [facility]  
3/18/2019  4:00 PM Azeem Ing, PT [facility]  
3/20/2019  4:00 PM Clement Moore, PTA [facility]  
3/22/2019  4:00 PM Azeem Ing, PT [facility]  
3/25/2019  4:00 PM Azeem Ing, PT [facility]  
3/27/2019  4:00 PM Clement Moore, PTA [facility]  
3/29/2019  4:00 PM Azeem Ing, PT [facility]  
4/1/2019  4:00 PM Azeem Ing, PT [facility]  
4/3/2019  4:00 PM Azeem Ing, PT [facility]  
4/5/2019  4:00 PM Azeem Ing, PT [facility]

## 2019-02-27 ENCOUNTER — HOSPITAL ENCOUNTER (OUTPATIENT)
Dept: PHYSICAL THERAPY | Age: 64
Discharge: HOME | End: 2019-02-27
Payer: COMMERCIAL

## 2019-02-27 PROCEDURE — 97140 MANUAL THERAPY 1/> REGIONS: CPT

## 2019-02-27 PROCEDURE — 97110 THERAPEUTIC EXERCISES: CPT

## 2019-02-27 NOTE — PROGRESS NOTES
PT DAILY TREATMENT NOTE 10-18 Patient Name: Ramon Lopez Date:2019 : 1955 [x]  Patient  Verified Payor: BLUE CROSS / Plan: [facility] PPO / Product Type: PPO / In time:4:30  Out time:5:08 Total Treatment Time (min): 38 Visit #: 13 of 24-40 Medicare/BCBS Only Total Timed Codes (min):  28 1:1 Treatment Time:  25 Treatment Area: Pain in left shoulder [M25.512] SUBJECTIVE Pain Level (0-10 scale): 2 Any medication changes, allergies to medications, adverse drug reactions, diagnosis change, or new procedure performed?: [x] No    [] Yes (see summary sheet for update) Subjective functional status/changes:   [] No changes reported \"Today is a pretty good day for pain\". OBJECTIVE Modality rationale: decrease edema, decrease inflammation and decrease pain to improve the patients ability to perform future task with improved ease. Type Additional Details  
[] Estim:  []Unatt       []IFC  []Premod []Other:  []w/ice   []w/heat Position: Location:  
[] Estim: []Att    []TENS instruct  []NMES []Other:  []w/US   []w/ice   []w/heat Position: Location:  
[]  Traction: [] Cervical       []Lumbar 
                     [] Prone          []Supine []Intermittent   []Continuous Lbs: 
[] before manual 
[] after manual  
[]  Ultrasound: []Continuous   [] Pulsed []1MHz   []3MHz W/cm2: 
Location:  
[]  Iontophoresis with dexamethasone Location: [] Take home patch  
[] In clinic [x]  Ice     []  heat 
[]  Ice massage 
[]  Laser  
[]  Anodyne Position: seated Location: left shoulder  
[]  Laser with stim 
[]  Other:  Position: Location:  
[]  Vasopneumatic Device Pressure:       [] lo [] med [] hi  
Temperature: [] lo [] med [] hi  
[] Skin assessment post-treatment:  []intact []redness- no adverse reaction 
  []redness  adverse reaction: 13 min Therapeutic Exercise:  [x] See flow sheet :  
Rationale: increase ROM and increase strength to improve the patients ability to perform future task with improved ease. 12 min Manual Therapy:  Left shoulder and elbow PROM, gentle GH joint mobs and scap mobs Rationale: increase ROM and increase tissue extensibility to improve patients functional mobility. With 
 [] TE 
 [] TA 
 [] neuro 
 [] other: Patient Education: [x] Review HEP [] Progressed/Changed HEP based on:  
[] positioning   [] body mechanics   [] transfers   [] heat/ice application   
[] other:   
 
Other Objective/Functional Measures: left shoulder PROM - 130 degrees Pain Level (0-10 scale) post treatment: 3 
 
ASSESSMENT/Changes in Function: Slight increase in pain post session. Improvements noted in left shoulder PROM today. Continue progressing left shoulder PROM per protocol. Patient will continue to benefit from skilled PT services to modify and progress therapeutic interventions, address functional mobility deficits, address ROM deficits, address strength deficits, analyze and address soft tissue restrictions, analyze and cue movement patterns, analyze and modify body mechanics/ergonomics and assess and modify postural abnormalities to attain remaining goals. [x]  See Plan of Care 
[]  See progress note/recertification 
[]  See Discharge Summary Progress towards goals / Updated goals: 1.  Patient will be able to improve FOTO score to 59 in order to demonstrate improvements in functional independence.  
2. Patient will be able to progress to AAROM phase void of pain in order to improve functional mobility. 3. Patient will be able to improve left shoulder PROM flexion to 160 degrees in order to improve functional mobility. 4.  Patient will be able to decrease left shoulder pain to no more than 2/10 in order to improve ease of future ADLs.  Progressing 2/14/19 - patient reports 3/10 pain in left shoulder. 2/27/19 - patient reports 2/10 pain today. 5.Patient will be able to improve left shoulder AROM flexion to 100 degrees (when able per protocol) in order to improve ease of reaching a shelf shoulder height. Will assess when able per protocol    
 
 
PLAN 
[]  Upgrade activities as tolerated     [x]  Continue plan of care 
[]  Update interventions per flow sheet      
[]  Discharge due to:_ 
[]  Other:_ [place] Maria Del Carmen, PT 2/27/2019  4:30 PM 
 
Future Appointments Date Time Provider Tawana Grajeda 3/1/2019  4:00 PM Diego Bouche, PT [facility]  
3/4/2019  3:30 PM Winferd Clos, PTA [facility]  
3/6/2019  4:30 PM Diego Bouche, PT [facility]  
3/8/2019  4:00 PM Diego Bouche, PT [facility]  
3/11/2019  4:00 PM Diego Bouche, PT [facility]  
3/13/2019  4:00 PM Diego Bouche, PT [facility]  
3/15/2019  4:00 PM Diego Bouche, PT [facility]  
3/18/2019  4:00 PM Diego Bouche, PT [facility]  
3/20/2019  4:00 PM Winferd Clos, PTA [facility]  
3/22/2019  4:00 PM Diego Bouche, PT [facility]  
3/25/2019  4:00 PM Diego Bouche, PT [facility]  
3/27/2019  4:00 PM Winferd Clos, PTA [facility]  
3/29/2019  4:00 PM Diego Bouche, PT [facility]  
4/1/2019  4:00 PM Diego Bouche, PT [facility]  
4/3/2019  4:00 PM Diego Bouche, PT [facility]  
4/5/2019  4:00 PM Diego Bouche, PT [facility]

## 2019-03-01 ENCOUNTER — HOSPITAL ENCOUNTER (OUTPATIENT)
Dept: PHYSICAL THERAPY | Age: 64
Discharge: HOME | End: 2019-03-01
Payer: COMMERCIAL

## 2019-03-01 PROCEDURE — 97110 THERAPEUTIC EXERCISES: CPT

## 2019-03-01 PROCEDURE — 97140 MANUAL THERAPY 1/> REGIONS: CPT

## 2019-03-01 NOTE — PROGRESS NOTES
PT DAILY TREATMENT NOTE 10-18 Patient Name: Luis E Loomis Date:3/1/2019 : 1955 [x]  Patient  Verified Payor: BLUE CROSS / Plan: [facility] PPO / Product Type: PPO / In time:4:00  Out time:4:35 Total Treatment Time (min): 35 Visit #: 14 of 24-40 Medicare/BCBS Only Total Timed Codes (min):  25 1:1 Treatment Time:  25 Treatment Area: Pain in left shoulder [M25.512] SUBJECTIVE Pain Level (0-10 scale): 3.5 Any medication changes, allergies to medications, adverse drug reactions, diagnosis change, or new procedure performed?: [x] No    [] Yes (see summary sheet for update) Subjective functional status/changes:   [] No changes reported \"I moved a weird way before coming in today and now its a little achy\". OBJECTIVE Modality rationale: decrease edema, decrease inflammation and decrease pain to improve the patients ability to perform future task with improved ease. Type Additional Details  
[] Estim:  []Unatt       []IFC  []Premod []Other:  []w/ice   []w/heat Position: Location:  
[] Estim: []Att    []TENS instruct  []NMES []Other:  []w/US   []w/ice   []w/heat Position: Location:  
[]  Traction: [] Cervical       []Lumbar 
                     [] Prone          []Supine []Intermittent   []Continuous Lbs: 
[] before manual 
[] after manual  
[]  Ultrasound: []Continuous   [] Pulsed []1MHz   []3MHz W/cm2: 
Location:  
[]  Iontophoresis with dexamethasone Location: [] Take home patch  
[] In clinic [x]  Ice     []  heat 
[]  Ice massage 
[]  Laser  
[]  Anodyne Position: seated Location: left shoulder   
[]  Laser with stim 
[]  Other:  Position: Location:  
[]  Vasopneumatic Device Pressure:       [] lo [] med [] hi  
Temperature: [] lo [] med [] hi  
[] Skin assessment post-treatment:  []intact []redness- no adverse reaction []redness  adverse reaction:  
 
15 min Therapeutic Exercise:  [x] See flow sheet :  
Rationale: increase ROM and increase strength to improve the patients ability to perform future task with improved ease. 10 min Manual Therapy:  Left shoulder and elbow PROM, gentle GH joint mobs and scap mobs Rationale: increase ROM and increase tissue extensibility to improve patients functional mobility. With 
 [] TE 
 [] TA 
 [] neuro 
 [] other: Patient Education: [x] Review HEP [] Progressed/Changed HEP based on:  
[] positioning   [] body mechanics   [] transfers   [] heat/ice application   
[] other:   
 
Other Objective/Functional Measures:   
 
Pain Level (0-10 scale) post treatment: 2 
 
ASSESSMENT/Changes in Function: Patient continues to demonstrate improvements in left shoulder PROM. Plan to continue progressing current exercises and shoulder PROM for additional 3 weeks prior to progressing to [facility] per protocol. Patient will continue to benefit from skilled PT services to modify and progress therapeutic interventions, address functional mobility deficits, address ROM deficits, address strength deficits, analyze and address soft tissue restrictions, analyze and cue movement patterns, analyze and modify body mechanics/ergonomics and assess and modify postural abnormalities to attain remaining goals. [x]  See Plan of Care 
[]  See progress note/recertification 
[]  See Discharge Summary Progress towards goals / Updated goals: 1.  Patient will be able to improve FOTO score to 59 in order to demonstrate improvements in functional independence.  
2. Patient will be able to progress to AAROM phase void of pain in order to improve functional mobility. 3. Patient will be able to improve left shoulder PROM flexion to 160 degrees in order to improve functional mobility.   
4.  Patient will be able to decrease left shoulder pain to no more than 2/10 in order to improve ease of future ADLs. Progressing 2/14/19 - patient reports 3/10 pain in left shoulder. 2/27/19 - patient reports 2/10 pain today. 5.Patient will be able to improve left shoulder AROM flexion to 100 degrees (when able per protocol) in order to improve ease of reaching a shelf shoulder height. Will assess when able per protocol    
 
PLAN 
[]  Upgrade activities as tolerated     [x]  Continue plan of care 
[]  Update interventions per flow sheet      
[]  Discharge due to:_ 
[]  Other:_ Janny Simpson, PT 3/1/2019  4:01 PM 
 
Future Appointments Date Time Provider Tawana Grajeda 3/4/2019  3:30 PM Kennth Barnacle, PTA [facility]  
3/6/2019  4:30 PM Cristal Horsfall, PT [facility]  
3/8/2019  4:00 PM Cristal Horsfall, PT [facility]  
3/11/2019  4:00 PM Cristal Horsfall, PT [facility]  
3/13/2019  4:00 PM Cristal Horsfall, PT [facility]  
3/15/2019  4:00 PM Cristal Horsfall, PT [facility]  
3/18/2019  4:00 PM Cristal Horsfall, PT [facility]  
3/20/2019  4:00 PM Kennth Barnacle, PTA [facility]  
3/22/2019  4:00 PM Cristal Horsfall, PT [facility]  
3/25/2019  4:00 PM Cristal Horsfall, PT [facility]  
3/27/2019  4:00 PM Kennth Barnacle, PTA [facility]  
3/29/2019  4:00 PM Cristal Horsfall, PT [facility]  
4/1/2019  4:00 PM Cristal Horsfall, PT [facility]  
4/3/2019  4:00 PM Cristal Horsfall, PT [facility]  
4/5/2019  4:00 PM Cristal Horsfall, PT [facility]

## 2019-03-04 ENCOUNTER — HOSPITAL ENCOUNTER (OUTPATIENT)
Dept: PHYSICAL THERAPY | Age: 64
Discharge: HOME | End: 2019-03-04
Payer: COMMERCIAL

## 2019-03-04 PROCEDURE — 97140 MANUAL THERAPY 1/> REGIONS: CPT

## 2019-03-04 NOTE — PROGRESS NOTES
PT DAILY TREATMENT NOTE 10-18 Patient Name: Ml Cowan Date:3/4/2019 : 1955 [x]  Patient  Verified Payor: BLUE CROSS / Plan: [facility] PPO / Product Type: PPO / In time:3:26  Out time:4:05 Total Treatment Time (min): 39 Visit #: 15 of 24-40 Medicare/BCBS Only Total Timed Codes (min):  29 1:1 Treatment Time:  13 Treatment Area: Pain in left shoulder [M25.512] SUBJECTIVE Pain Level (0-10 scale): 310 Any medication changes, allergies to medications, adverse drug reactions, diagnosis change, or new procedure performed?: [x] No    [] Yes (see summary sheet for update) Subjective functional status/changes:   [x] No changes reported OBJECTIVE Modality rationale: decrease inflammation and decrease pain to improve the patients ability to perform ADL's. Min Type Additional Details  
 [] Estim:  []Unatt       []IFC  []Premod []Other:  []w/ice   []w/heat Position: Location:  
 [] Estim: []Att    []TENS instruct  []NMES []Other:  []w/US   []w/ice   []w/heat Position: Location:  
 []  Traction: [] Cervical       []Lumbar 
                     [] Prone          []Supine []Intermittent   []Continuous Lbs: 
[] before manual 
[] after manual  
 []  Ultrasound: []Continuous   [] Pulsed []1MHz   []3MHz W/cm2: 
Location:  
 []  Iontophoresis with dexamethasone Location: [] Take home patch  
[] In clinic  
10 [x]  Ice     []  heat 
[]  Ice massage 
[]  Laser  
[]  Anodyne Position: Seated Location: Left shoulder  
 []  Laser with stim 
[]  Other:  Position: Location:  
 []  Vasopneumatic Device Pressure:       [] lo [] med [] hi  
Temperature: [] lo [] med [] hi  
[] Skin assessment post-treatment:  []intact []redness- no adverse reaction 
  []redness  adverse reaction:  
 
21 min Therapeutic Exercise:  [x] See flow sheet :  
 Rationale: increase ROM, increase strength and increase proprioception to improve the patients ability to perform ADL's. 
 
8 min Manual Therapy:  Left ST/GH joint mobs grade III. Shoulder and elbow PROM per protocol. Rationale: decrease pain, increase ROM and increase tissue extensibility to prepare for AAROM phase. With 
 [x] TE 
 [] TA 
 [] neuro 
 [] other: Patient Education: [x] Review HEP [] Progressed/Changed HEP based on:  
[] positioning   [] body mechanics   [] transfers   [] heat/ice application   
[] other:   
 
Other Objective/Functional Measures: No change in there ex. Pain Level (0-10 scale) post treatment: 4.5/10 ASSESSMENT/Changes in Function: Pt reported increase soreness with improved mobility and decrease stiffness/tightness post-treatment. Continue PT to improve PROM and gradually introduce AAROM per protocol. Patient will continue to benefit from skilled PT services to modify and progress therapeutic interventions, address functional mobility deficits, address ROM deficits, address strength deficits, analyze and address soft tissue restrictions and analyze and cue movement patterns to attain remaining goals. [x]  See Plan of Care 
[]  See progress note/recertification 
[]  See Discharge Summary Progress towards goals / Updated goals: 1.  Patient will be able to improve FOTO score to 59 in order to demonstrate improvements in functional independence.  
2. Patient will be able to progress to AAROM phase void of pain in order to improve functional mobility. 3. Patient will be able to improve left shoulder PROM flexion to 160 degrees in order to improve functional mobility. 4.  Patient will be able to decrease left shoulder pain to no more than 2/10 in order to improve ease of future ADLs. Progressing 2/14/19 - patient reports 3/10 pain in left shoulder.  2/27/19 - patient reports 2/10 pain today.  
 5.Patient will be able to improve left shoulder AROM flexion to 100 degrees (when able per protocol) in order to improve ease of reaching a shelf shoulder height. Will assess when able per protocol    
 
PLAN 
[]  Upgrade activities as tolerated     [x]  Continue plan of care 
[]  Update interventions per flow sheet      
[]  Discharge due to:_ 
[]  Other:_   
 
Amanda AllenALEXANDER 3/4/2019  3:23 PM 
 
Future Appointments Date Time Provider Tawana Grajeda 3/4/2019  3:30 PM Mayco Emerson, PTA [facility]  
3/6/2019  4:30 PM Angel Beckers, PT [facility]  
3/8/2019  4:00 PM Angel Beckers, PT [facility]  
3/11/2019  4:00 PM Angel Beckers, PT [facility]  
3/13/2019  4:00 PM Angel Beckers, PT [facility]  
3/15/2019  4:00 PM Angel Beckers, PT [facility]  
3/18/2019  4:00 PM Angel Beckers, PT [facility]  
3/20/2019  4:00 PM Mayco Emerson, PTA [facility]  
3/22/2019  4:00 PM Angel Beckers, PT [facility]  
3/25/2019  4:00 PM Angel Beckers, PT [facility]  
3/27/2019  4:00 PM Mayco Emerson, PTA [facility]  
3/29/2019  4:00 PM Angel Beckers, PT [facility]  
4/1/2019  4:00 PM Angel Beckers, PT [facility]  
4/3/2019  4:00 PM Angel Beckers, PT [facility]  
4/5/2019  4:00 PM Angel Beckers, PT [facility]

## 2019-03-06 ENCOUNTER — HOSPITAL ENCOUNTER (OUTPATIENT)
Dept: PHYSICAL THERAPY | Age: 64
Discharge: HOME | End: 2019-03-06
Payer: COMMERCIAL

## 2019-03-06 PROCEDURE — 97110 THERAPEUTIC EXERCISES: CPT

## 2019-03-06 PROCEDURE — 97140 MANUAL THERAPY 1/> REGIONS: CPT

## 2019-03-06 NOTE — PROGRESS NOTES
PT DAILY TREATMENT NOTE 10-18    Patient Name: Liza Hadley  Date:3/6/2019  : 1955  [x]  Patient  Verified  Payor: BLUE CROSS / Plan: [id number] PPO / Product Type: PPO /    In time:4:30  Out time:5:06  Total Treatment Time (min): 36  Visit #: 16 of 24-40    Medicare/BCBS Only   Total Timed Codes (min):  26 1:1 Treatment Time:  23       Treatment Area: Pain in left shoulder [M25.512]    SUBJECTIVE  Pain Level (0-10 scale): 4  Any medication changes, allergies to medications, adverse drug reactions, diagnosis change, or new procedure performed?: [x] No    [] Yes (see summary sheet for update)  Subjective functional status/changes:   [] No changes reported  \"Pain is up a little bit\". OBJECTIVE    Modality rationale: decrease edema, decrease inflammation and decrease pain to improve the patients ability to perform future task with improved ease.     Type Additional Details   [] Estim:  []Unatt       []IFC  []Premod                        []Other:  []w/ice   []w/heat  Position:  Location:   [] Estim: []Att    []TENS instruct  []NMES                    []Other:  []w/US   []w/ice   []w/heat  Position:  Location:   []  Traction: [] Cervical       []Lumbar                       [] Prone          []Supine                       []Intermittent   []Continuous Lbs:  [] before manual  [] after manual   []  Ultrasound: []Continuous   [] Pulsed                           []1MHz   []3MHz W/cm2:  Location:   []  Iontophoresis with dexamethasone         Location: [] Take home patch   [] In clinic   [x]  Ice     []  heat  []  Ice massage  []  Laser   []  Anodyne Position: seated  Location: left shoulder    []  Laser with stim  []  Other:  Position:  Location:   []  Vasopneumatic Device Pressure:       [] lo [] med [] hi   Temperature: [] lo [] med [] hi   [] Skin assessment post-treatment:  []intact []redness- no adverse reaction    []redness  adverse reaction:       16 min Therapeutic Exercise:  [x] See flow sheet :   Rationale: increase ROM and increase strength to improve the patients ability to perform ADLs with improved ease. 10 min Manual Therapy:  Shoulder and elbow PROM per protocol, Left ST/GH joint mobs, STM left UT   Rationale: decrease pain, increase ROM and increase tissue extensibility to improve patients functional mobility. With   [] TE   [] TA   [] neuro   [] other: Patient Education: [x] Review HEP    [] Progressed/Changed HEP based on:   [] positioning   [] body mechanics   [] transfers   [] heat/ice application    [] other:      Other Objective/Functional Measures: Added stool scoots today. Pain Level (0-10 scale) post treatment: 2    ASSESSMENT/Changes in Function: Patient tolerated new exercise without increase in pain. Decreased pain noted post session. Good progression with left glenohumeral PROM. Continue progressing PROM per protocol. Patient will continue to benefit from skilled PT services to modify and progress therapeutic interventions, address functional mobility deficits, address ROM deficits, address strength deficits, analyze and address soft tissue restrictions, analyze and cue movement patterns, analyze and modify body mechanics/ergonomics and assess and modify postural abnormalities to attain remaining goals. []  See Plan of Care  []  See progress note/recertification  []  See Discharge Summary         Progress towards goals / Updated goals:     1.  Patient will be able to improve FOTO score to 59 in order to demonstrate improvements in functional independence.   2. Patient will be able to progress to AAROM phase void of pain in order to improve functional mobility. 3. Patient will be able to improve left shoulder PROM flexion to 160 degrees in order to improve functional mobility. 4.  Patient will be able to decrease left shoulder pain to no more than 2/10 in order to improve ease of future ADLs.  Progressing 2/14/19 - patient reports 3/10 pain in left shoulder. 2/27/19 - patient reports 2/10 pain today.   5.Patient will be able to improve left shoulder AROM flexion to 100 degrees (when able per protocol) in order to improve ease of reaching a shelf shoulder height.  Will assess when able per protocol          PLAN  []  Upgrade activities as tolerated     []  Continue plan of care  []  Update interventions per flow sheet       []  Discharge due to:_  []  Other:_      Brittany Zana, PT 3/6/2019  4:34 PM    Future Appointments   Date Time Provider Tawana Grajeda   3/8/2019  4:00 PM Ledon Austin, PT [facility]   3/11/2019  4:00 PM Ledon Austin A, PT [facility]   3/13/2019  4:00 PM Ledon Austin, PT [facility]   3/15/2019  4:00 PM Ledon Austin, PT [facility]   3/18/2019  4:00 PM Ledon Austin, PT [facility]   3/20/2019  4:00 PM Nelson Helling, PTA [facility]   3/22/2019  4:00 PM Ledon Austin, PT [facility]   3/25/2019  4:00 PM Ledon Austin, PT [facility]   3/27/2019  4:00 PM Fracisco Helling, PTA [facility]   3/29/2019  4:00 PM Ledon Austin, PT [facility]   4/1/2019  4:00 PM Ledon Austin, PT [facility]   4/3/2019  4:00 PM Ledon Austin, PT [facility]   4/5/2019  4:00 PM Ledon Austin, PT [facility]

## 2019-03-08 ENCOUNTER — HOSPITAL ENCOUNTER (OUTPATIENT)
Dept: PHYSICAL THERAPY | Age: 64
Discharge: HOME | End: 2019-03-08
Payer: COMMERCIAL

## 2019-03-08 PROCEDURE — 97140 MANUAL THERAPY 1/> REGIONS: CPT

## 2019-03-08 PROCEDURE — 97110 THERAPEUTIC EXERCISES: CPT

## 2019-03-08 NOTE — PROGRESS NOTES
PT DAILY TREATMENT NOTE 10-18    Patient Name: Daily Palacio  Date:3/8/2019  : 1955  [x]  Patient  Verified  Payor: BLUE CROSS / Plan: [facility] PPO / Product Type: PPO /    In time:4:00  Out time:4:34  Total Treatment Time (min): 34  Visit #: 17 of 24-40    Medicare/BCBS Only   Total Timed Codes (min):  24 1:1 Treatment Time:  23       Treatment Area: Pain in left shoulder [M25.512]    SUBJECTIVE  Pain Level (0-10 scale): 3  Any medication changes, allergies to medications, adverse drug reactions, diagnosis change, or new procedure performed?: [x] No    [] Yes (see summary sheet for update)  Subjective functional status/changes:   [] No changes reported  \"This cold weather had got me tight\". OBJECTIVE    Modality rationale: decrease edema, decrease inflammation and decrease pain to improve the patients ability to improve future task with improved ease.     Min Type Additional Details    [] Estim:  []Unatt       []IFC  []Premod                        []Other:  []w/ice   []w/heat  Position:  Location:    [] Estim: []Att    []TENS instruct  []NMES                    []Other:  []w/US   []w/ice   []w/heat  Position:  Location:    []  Traction: [] Cervical       []Lumbar                       [] Prone          []Supine                       []Intermittent   []Continuous Lbs:  [] before manual  [] after manual    []  Ultrasound: []Continuous   [] Pulsed                           []1MHz   []3MHz W/cm2:  Location:    []  Iontophoresis with dexamethasone         Location: [] Take home patch   [] In clinic   10 [x]  Ice     []  heat  []  Ice massage  []  Laser   []  Anodyne Position: seated  Location: left shoulder    []  Laser with stim  []  Other:  Position:  Location:    []  Vasopneumatic Device Pressure:       [] lo [] med [] hi   Temperature: [] lo [] med [] hi   [] Skin assessment post-treatment:  []intact []redness- no adverse reaction    []redness  adverse reaction:       13 min Therapeutic Exercise:  [x] See flow sheet :   Rationale: increase ROM and increase strength to improve the patients ability to perform ADLs with improved ease. 10 min Manual Therapy:  Shoulder and elbow PROM per protocol, Left ST/GH joint mobs, STM left UT   Rationale: decrease pain, increase ROM and increase tissue extensibility to improve patients functional mobility. With   [] TE   [] TA   [] neuro   [] other: Patient Education: [x] Review HEP    [] Progressed/Changed HEP based on:   [] positioning   [] body mechanics   [] transfers   [] heat/ice application    [] other:      Other Objective/Functional Measures: Decreased PROM noted today. Pain Level (0-10 scale) post treatment: 3    ASSESSMENT/Changes in Function: Patient presents with increased restrictions in shoulder mobility noted during PROM compared to normal. Patient with increased stiffness in left UT, STM performed in order to address. Continue progressing patient with PROM per protocol. Patient will continue to benefit from skilled PT services to modify and progress therapeutic interventions, address functional mobility deficits, address ROM deficits, address strength deficits, analyze and address soft tissue restrictions, analyze and cue movement patterns, analyze and modify body mechanics/ergonomics and assess and modify postural abnormalities to attain remaining goals. [x]  See Plan of Care  []  See progress note/recertification  []  See Discharge Summary          Progress towards goals / Updated goals:  1.  Patient will be able to improve FOTO score to 59 in order to demonstrate improvements in functional independence.   2. Patient will be able to progress to AAROM phase void of pain in order to improve functional mobility. 3. Patient will be able to improve left shoulder PROM flexion to 160 degrees in order to improve functional mobility.    4.  Patient will be able to decrease left shoulder pain to no more than 2/10 in order to improve ease of future ADLs. Progressing 2/14/19 - patient reports 3/10 pain in left shoulder. 2/27/19 - patient reports 2/10 pain today.   5.Patient will be able to improve left shoulder AROM flexion to 100 degrees (when able per protocol) in order to improve ease of reaching a shelf shoulder height.  Will assess when able per protocol         PLAN  []  Upgrade activities as tolerated     [x]  Continue plan of care  []  Update interventions per flow sheet       []  Discharge due to:_  []  Other:_      Anila Goldstein, PT 3/8/2019  4:01 PM    Future Appointments   Date Time Provider Tawana Grajeda   3/11/2019  4:00 PM Tura Gens A, PT [facility]   3/13/2019  4:00 PM Tura Gens A, PT [facility]   3/15/2019  4:00 PM Tura Gens, PT [facility]   3/18/2019  4:00 PM Tura Gens, PT [facility]   3/20/2019  4:00 PM Meka Maha, PTA [facility]   3/22/2019  4:00 PM Tura Gens, PT [facility]   3/25/2019  4:00 PM Tura Gens, PT [facility]   3/27/2019  4:00 PM Meka Maha, PTA [facility]   3/29/2019  4:00 PM Tura Gens, PT [facility]   4/1/2019  4:00 PM Tura Gens, PT [facility]   4/3/2019  4:00 PM Tura Gens, PT [facility]   4/5/2019  4:00 PM Tura Gens, PT [facility]

## 2019-03-11 ENCOUNTER — HOSPITAL ENCOUNTER (OUTPATIENT)
Dept: PHYSICAL THERAPY | Age: 64
Discharge: HOME | End: 2019-03-11
Payer: COMMERCIAL

## 2019-03-11 PROCEDURE — 97110 THERAPEUTIC EXERCISES: CPT

## 2019-03-11 PROCEDURE — 97140 MANUAL THERAPY 1/> REGIONS: CPT

## 2019-03-11 NOTE — PROGRESS NOTES
PT DAILY TREATMENT NOTE 10-18    Patient Name: Edward Valenzuela  Date:3/11/2019  : 1955  [x]  Patient  Verified  Payor: BLUE CROSS / Plan: [facility] PPO / Product Type: PPO /    In time:4:04  Out time:4:43  Total Treatment Time (min): 39  Visit #: 18 of 24-40    Medicare/BCBS Only   Total Timed Codes (min):  29 1:1 Treatment Time:  23       Treatment Area: Pain in left shoulder [M25.512]    SUBJECTIVE  Pain Level (0-10 scale): 0   Any medication changes, allergies to medications, adverse drug reactions, diagnosis change, or new procedure performed?: [x] No    [] Yes (see summary sheet for update)  Subjective functional status/changes:   [] No changes reported  \"I am doing good today\". OBJECTIVE    Modality rationale: decrease edema, decrease inflammation and decrease pain to improve the patients ability to perform future task with improved ease.     Min Type Additional Details    [] Estim:  []Unatt       []IFC  []Premod                        []Other:  []w/ice   []w/heat  Position:  Location:    [] Estim: []Att    []TENS instruct  []NMES                    []Other:  []w/US   []w/ice   []w/heat  Position:  Location:    []  Traction: [] Cervical       []Lumbar                       [] Prone          []Supine                       []Intermittent   []Continuous Lbs:  [] before manual  [] after manual    []  Ultrasound: []Continuous   [] Pulsed                           []1MHz   []3MHz W/cm2:  Location:    []  Iontophoresis with dexamethasone         Location: [] Take home patch   [] In clinic   10 [x]  Ice     []  heat  []  Ice massage  []  Laser   []  Anodyne Position: seated   Location: left shoulder     []  Laser with stim  []  Other:  Position:  Location:    []  Vasopneumatic Device Pressure:       [] lo [] med [] hi   Temperature: [] lo [] med [] hi   [] Skin assessment post-treatment:  []intact []redness- no adverse reaction    []redness  adverse reaction:       17 min Therapeutic Exercise:  [] See flow sheet :   Rationale: increase ROM and increase strength to improve the patients ability to perform future task with improved ease. 12 min Manual Therapy:  Left shoulder and elbow PROM per protocol, Left ST/GH joint mobs, STM left UT   Rationale: decrease pain, increase ROM and increase tissue extensibility to improve patients functional mobility. With   [] TE   [] TA   [] neuro   [] other: Patient Education: [x] Review HEP    [] Progressed/Changed HEP based on:   [] positioning   [] body mechanics   [] transfers   [] heat/ice application    [] other:      Other Objective/Functional Measures: Added scap squeezes today     Pain Level (0-10 scale) post treatment: 2    ASSESSMENT/Changes in Function: Slight increase in pain post manual. Patient with increased myofascial restrictions in left UT, STM performed in order to address tightness. Plan to continue progressing PROM per protocol. Patient will continue to benefit from skilled PT services to modify and progress therapeutic interventions, address functional mobility deficits, address ROM deficits, address strength deficits, analyze and address soft tissue restrictions, analyze and cue movement patterns, analyze and modify body mechanics/ergonomics and assess and modify postural abnormalities to attain remaining goals. [x]  See Plan of Care  []  See progress note/recertification  []  See Discharge Summary         Progress towards goals / Updated goals:  1.  Patient will be able to improve FOTO score to 59 in order to demonstrate improvements in functional independence.   2. Patient will be able to progress to AAROM phase void of pain in order to improve functional mobility. 3. Patient will be able to improve left shoulder PROM flexion to 160 degrees in order to improve functional mobility. 4.  Patient will be able to decrease left shoulder pain to no more than 2/10 in order to improve ease of future ADLs. Progressing 2/14/19 - patient reports 3/10 pain in left shoulder. 2/27/19 - patient reports 2/10 pain today.   5.Patient will be able to improve left shoulder AROM flexion to 100 degrees (when able per protocol) in order to improve ease of reaching a shelf shoulder height.  Will assess when able per protocol        PLAN  []  Upgrade activities as tolerated     [x]  Continue plan of care  []  Update interventions per flow sheet       []  Discharge due to:_  []  Other:_      Margret Torres, PT 3/11/2019  4:10 PM    Future Appointments   Date Time Provider Tawana Grajeda   3/13/2019  4:00 PM Reji Lime, PT [facility]   3/15/2019  4:00 PM Reji Lime A, PT [facility]   3/18/2019  4:00 PM Reji Lime, PT [facility]   3/20/2019  4:00 PM Ino Gem, PTA [facility]   3/22/2019  4:00 PM Reji Lime, PT [facility]   3/25/2019  4:00 PM Reji Lime, PT [facility]   3/27/2019  4:00 PM Ino Gem, PTA [facility]   3/29/2019  4:00 PM Reji Lime, PT [facility]   4/1/2019  4:00 PM Reji Lime, PT [facility]   4/3/2019  4:00 PM Reji Lime, PT [facility]   4/5/2019  4:00 PM Reji Lime, PT [facility]

## 2019-03-13 ENCOUNTER — HOSPITAL ENCOUNTER (OUTPATIENT)
Dept: PHYSICAL THERAPY | Age: 64
Discharge: HOME | End: 2019-03-13
Payer: COMMERCIAL

## 2019-03-13 PROCEDURE — 97110 THERAPEUTIC EXERCISES: CPT

## 2019-03-13 PROCEDURE — 97140 MANUAL THERAPY 1/> REGIONS: CPT

## 2019-03-13 NOTE — PROGRESS NOTES
PT DAILY TREATMENT NOTE 10-18    Patient Name: Татьяна Hussein  Date:3/13/2019  : 1955  [x]  Patient  Verified  Payor: BLUE CROSS / Plan: [facility] PPO / Product Type: PPO /    In time:4:00  Out time:4:35  Total Treatment Time (min): 35  Visit #: 19 of 24-40    Medicare/BCBS Only   Total Timed Codes (min):  25 1:1 Treatment Time:  25       Treatment Area: Pain in left shoulder [M25.512]    SUBJECTIVE  Pain Level (0-10 scale): 0  Any medication changes, allergies to medications, adverse drug reactions, diagnosis change, or new procedure performed?: [x] No    [] Yes (see summary sheet for update)  Subjective functional status/changes:   [] No changes reported  \"Today is a good day\". OBJECTIVE    Modality rationale: decrease edema, decrease inflammation and decrease pain to improve the patients ability to perform future task with improved ease.     Type Additional Details   [] Estim:  []Unatt       []IFC  []Premod                        []Other:  []w/ice   []w/heat  Position:  Location:   [] Estim: []Att    []TENS instruct  []NMES                    []Other:  []w/US   []w/ice   []w/heat  Position:  Location:   []  Traction: [] Cervical       []Lumbar                       [] Prone          []Supine                       []Intermittent   []Continuous Lbs:  [] before manual  [] after manual   []  Ultrasound: []Continuous   [] Pulsed                           []1MHz   []3MHz W/cm2:  Location:   []  Iontophoresis with dexamethasone         Location: [] Take home patch   [] In clinic   [x]  Ice     []  heat  []  Ice massage  []  Laser   []  Anodyne Position: seated   Location: left shoulder    []  Laser with stim  []  Other:  Position:  Location:   []  Vasopneumatic Device Pressure:       [] lo [] med [] hi   Temperature: [] lo [] med [] hi   [] Skin assessment post-treatment:  []intact []redness- no adverse reaction    []redness  adverse reaction:       13 min Therapeutic Exercise:  [x] See flow sheet :   Rationale: increase ROM and increase strength to improve the patients ability to perform future task with improved ease. 12 min Manual Therapy:   Left shoulder and elbow PROM per protocol, Left ST/GH joint mobs, STM left UT   Rationale: decrease pain, increase ROM and increase tissue extensibility to improve patients functional mobility. With   [] TE   [] TA   [] neuro   [] other: Patient Education: [x] Review HEP    [] Progressed/Changed HEP based on:   [] positioning   [] body mechanics   [] transfers   [] heat/ice application    [] other:      Other Objective/Functional Measures: Left shoulder PROM flexion - 140 degrees     Pain Level (0-10 scale) post treatment: 0    ASSESSMENT/Changes in Function: Good progress noted in regards to PROM. Patient reports he returns to [facility] for follow up 3/14/19. Plan to continue progressing PROM per protocol until able to begin AAROM. Patient will continue to benefit from skilled PT services to modify and progress therapeutic interventions, address functional mobility deficits, address ROM deficits, address strength deficits, analyze and address soft tissue restrictions, analyze and cue movement patterns, analyze and modify body mechanics/ergonomics and assess and modify postural abnormalities to attain remaining goals. [x]  See Plan of Care  []  See progress note/recertification  []  See Discharge Summary         Progress towards goals / Updated goals:  1.  Patient will be able to improve FOTO score to 59 in order to demonstrate improvements in functional independence.   2. Patient will be able to progress to AAROM phase void of pain in order to improve functional mobility. 3. Patient will be able to improve left shoulder PROM flexion to 160 degrees in order to improve functional mobility.  Progressing 3/13/19 - Left shoulder PROM flexion - 140 degrees   4.  Patient will be able to decrease left shoulder pain to no more than 2/10 in order to improve ease of future ADLs. Progressing 2/14/19 - patient reports 3/10 pain in left shoulder. 2/27/19 - patient reports 2/10 pain today.   5.Patient will be able to improve left shoulder AROM flexion to 100 degrees (when able per protocol) in order to improve ease of reaching a shelf shoulder height.  Will assess when able per protocol      PLAN  []  Upgrade activities as tolerated     [x]  Continue plan of care  []  Update interventions per flow sheet       []  Discharge due to:_  []  Other:_      Marine Aas, PT 3/13/2019  4:04 PM    Future Appointments   Date Time Provider Tawana Grajeda   3/15/2019  4:00 PM Sergio Quivers A, PT [facility]   3/18/2019  4:00 PM Sergio Quivers, PT [facility]   3/20/2019  4:00 PM Kerri Shoulder, PTA [facility]   3/22/2019  4:00 PM Sergio Quivers, PT [facility]   3/25/2019  4:00 PM Sergio Quivers, PT [facility]   3/27/2019  4:00 PM Kerri Shoulder, PTA [facility]   3/29/2019  4:00 PM Sergio Quivers, PT [facility]   4/1/2019  4:00 PM Sergio Quivers, PT [facility]   4/3/2019  4:00 PM Sergio Quivers, PT [facility]   4/5/2019  4:00 PM Sergio Quivers, PT [facility]

## 2019-03-15 ENCOUNTER — HOSPITAL ENCOUNTER (OUTPATIENT)
Dept: PHYSICAL THERAPY | Age: 64
Discharge: HOME | End: 2019-03-15
Payer: COMMERCIAL

## 2019-03-15 PROCEDURE — 97110 THERAPEUTIC EXERCISES: CPT

## 2019-03-15 PROCEDURE — 97140 MANUAL THERAPY 1/> REGIONS: CPT

## 2019-03-15 NOTE — PROGRESS NOTES
PT DAILY TREATMENT NOTE 10-18    Patient Name: Hal Alert  Date:3/15/2019  : 1955  [x]  Patient  Verified  Payor: BLUE CROSS / Plan: [facility] [id number] PPO / Product Type: PPO /    In time:4:06  Out time:4:40  Total Treatment Time (min): 34  Visit #: 20 of 24-40    Medicare/BCBS Only   Total Timed Codes (min):  24 1:1 Treatment Time:  24       Treatment Area: Pain in left shoulder [M25.512]    SUBJECTIVE  Pain Level (0-10 scale): 2  Any medication changes, allergies to medications, adverse drug reactions, diagnosis change, or new procedure performed?: [x] No    [] Yes (see summary sheet for update)  Subjective functional status/changes:   [] No changes reported  \"Feel good, I get to wean out of the sling\". OBJECTIVE    Modality rationale: decrease edema, decrease inflammation and decrease pain to improve the patients ability to perform future task with improved ease.     Type Additional Details   [] Estim:  []Unatt       []IFC  []Premod                        []Other:  []w/ice   []w/heat  Position:  Location:   [] Estim: []Att    []TENS instruct  []NMES                    []Other:  []w/US   []w/ice   []w/heat  Position:  Location:   []  Traction: [] Cervical       []Lumbar                       [] Prone          []Supine                       []Intermittent   []Continuous Lbs:  [] before manual  [] after manual   []  Ultrasound: []Continuous   [] Pulsed                           []1MHz   []3MHz W/cm2:  Location:   []  Iontophoresis with dexamethasone         Location: [] Take home patch   [] In clinic   [x]  Ice     []  heat  []  Ice massage  []  Laser   []  Anodyne Position: seated  Location: Left shoulder    []  Laser with stim  []  Other:  Position:  Location:   []  Vasopneumatic Device  Pressure:       [] lo [] med [] hi   Temperature: [] lo [] med [] hi   [] Skin assessment post-treatment:  []intact []redness- no adverse reaction    []redness  adverse reaction:       14 min Therapeutic Exercise:  [x] See flow sheet :   Rationale: increase ROM and increase strength to improve the patients ability to perform future ADLs with improved ease. 10 min Manual Therapy:  Left shoulder and elbow PROM per protocol, Left ST/GH joint mobs   Rationale: decrease pain, increase ROM and increase tissue extensibility to improve patients functional mobility. With   [] TE   [] TA   [] neuro   [] other: Patient Education: [x] Review HEP    [] Progressed/Changed HEP based on:   [] positioning   [] body mechanics   [] transfers   [] heat/ice application    [] other:      Other Objective/Functional Measures: Added AAROM elbow flexion/extension today. Pain Level (0-10 scale) post treatment: 2    ASSESSMENT/Changes in Function: Progressing well in regards to PROM, ocassional cueing required in order to avoid muscle guarding during PROM. Plan to initiate AAROM/AROM phase next visit per protocol. Patient will continue to benefit from skilled PT services to modify and progress therapeutic interventions, address functional mobility deficits, address ROM deficits, address strength deficits, analyze and address soft tissue restrictions, analyze and cue movement patterns, analyze and modify body mechanics/ergonomics and assess and modify postural abnormalities to attain remaining goals. [x]  See Plan of Care  []  See progress note/recertification  []  See Discharge Summary         Progress towards goals / Updated goals:  1.  Patient will be able to improve FOTO score to 59 in order to demonstrate improvements in functional independence. Progressing 3/15/19 - FOTO score 42  2. Patient will be able to progress to AAROM phase void of pain in order to improve functional mobility. 3. Patient will be able to improve left shoulder PROM flexion to 160 degrees in order to improve functional mobility.  Progressing 3/13/19 - Left shoulder PROM flexion - 140 degrees           4.  Patient will be able to decrease left shoulder pain to no more than 2/10 in order to improve ease of future ADLs. Progressing 2/14/19 - patient reports 3/10 pain in left shoulder. 2/27/19 - patient reports 2/10 pain today.   5.Patient will be able to improve left shoulder AROM flexion to 100 degrees (when able per protocol) in order to improve ease of reaching a shelf shoulder height.  Will assess when able per protocol        PLAN  []  Upgrade activities as tolerated     [x]  Continue plan of care  []  Update interventions per flow sheet       []  Discharge due to:_  []  Other:_      Augustine Griffin, PT 3/15/2019  4:06 PM    Future Appointments   Date Time Provider Tawana Grajeda   3/18/2019  4:00 PM Robin CULLEN, PT [facility]   3/20/2019  4:00 PM Tyler Rock, PTA [facility]   3/22/2019  4:00 PM Robin Butler, PT [facility]   3/25/2019  4:00 PM Robin Butler, PT [facility]   3/27/2019  4:00 PM Tyler Rock, PTA [facility]   3/29/2019  4:00 PM Robin Butler, PT [facility]   4/1/2019  4:00 PM Robin Butler, PT [facility]   4/3/2019  4:00 PM Robin Butler, PT [facility]   4/5/2019  4:00 PM Robin Butler, PT [facility]

## 2019-03-15 NOTE — PROGRESS NOTES
[facility]  [address]  [place], [address]  [phone] fax    Physical Therapy Progress Note  Patient name: Pam Gan of Care: 2019   Referral source: Juan David Pabon MD : 1955                Medical Diagnosis: Pain in left shoulder [M25.512]  Payor: [employer] ([employer]) / Plan: [facility] PPO / Product Type: PPO /  Onset Date: DOS: 19   Treatment Diagnosis: Left subscapularis repair, bicep tenodesis, SAD, and capsular release with manipulation   Prior Hospitalization: see medical history Provider#: [id number]   Medications: Verified on Patient summary List    Comorbidities: none reported   Prior Level of Function: (I) with all ADLs and recreational activities      Visits from Start of Care: 20    Missed Visits: 0        Key Functional Changes: Patient continues to demonstrate improvements in regards to PROM and activity tolerance. Patient demonstrates compliance with HEP and all precautions. Plan to initiate AAROM/AROM next visit per protocol. Patient demonstrates good pain management, with minimal pain noted at each session. 1.  Patient will be able to improve FOTO score to 59 in order to demonstrate improvements in functional independence. Progressing 3/15/19 - FOTO score 42  2. Patient will be able to progress to AAROM phase void of pain in order to improve functional mobility. 3. Patient will be able to improve left shoulder PROM flexion to 160 degrees in order to improve functional mobility. Progressing 3/13/19 - Left shoulder PROM flexion - 140 degrees           4.  Patient will be able to decrease left shoulder pain to no more than 2/10 in order to improve ease of future ADLs. Progressing 19 - patient reports 3/10 pain in left shoulder.  19 - patient reports 2/10 pain today.   5.Patient will be able to improve left shoulder AROM flexion to 100 degrees (when able per protocol) in order to improve ease of reaching a shelf shoulder height. Will assess when able per protocol          Updated Goals: to be achieved in 4 weeks:   1. Patient will be able to improve FOTO score to 59 in order to demonstrate improvements in functional independence. Progressing 3/15/19 - FOTO score 42  2. Patient will be able to progress to AAROM phase void of pain in order to improve functional mobility. 3. Patient will be able to improve left shoulder PROM flexion to 160 degrees in order to improve functional mobility. Progressing 3/13/19 - Left shoulder PROM flexion - 140 degrees    4. Patient will be able to improve left shoulder AROM flexion to 100 degrees (when able per protocol) in order to improve ease of reaching a shelf shoulder height. Will assess when able per protocol      ASSESSMENT/RECOMMENDATIONS: Progressing well in regards to PROM, ocassional cueing required in order to avoid muscle guarding during PROM. Plan to initiate AAROM/AROM phase next visit per protocol. [x]Continue therapy per initial plan/protocol at a frequency of  2-3 x per week for 4 weeks  []Continue therapy with the following recommended changes:_____________________      _____________________________________________________________________  []Discontinue therapy progressing towards or have reached established goals  []Discontinue therapy due to lack of appreciable progress towards goals  []Discontinue therapy due to lack of attendance or compliance  []Await Physician's recommendations/decisions regarding therapy  []Other:________________________________________________________________    Thank you for this referral.    Goyo Bain, PT 3/15/2019 4:07 PM  NOTE TO PHYSICIAN:  PLEASE COMPLETE THE ORDERS BELOW AND   FAX TO [facility]: [phone]  If you are unable to process this request in 24 hours please contact our office: [phone]    ?  I have read the above report and request that my patient continue as recommended. ? I have read the above report and request that my patient continue therapy with the following changes/special instructions:__________________________________________________________  ? I have read the above report and request that my patient be discharged from therapy.     Physicians signature: ______________________________Date: ______Time:______

## 2019-03-18 ENCOUNTER — APPOINTMENT (OUTPATIENT)
Dept: PHYSICAL THERAPY | Age: 64
End: 2019-03-18
Payer: COMMERCIAL

## 2019-03-20 ENCOUNTER — HOSPITAL ENCOUNTER (OUTPATIENT)
Dept: PHYSICAL THERAPY | Age: 64
Discharge: HOME | End: 2019-03-20
Payer: COMMERCIAL

## 2019-03-20 PROCEDURE — 97140 MANUAL THERAPY 1/> REGIONS: CPT

## 2019-03-20 PROCEDURE — 97110 THERAPEUTIC EXERCISES: CPT

## 2019-03-20 NOTE — PROGRESS NOTES
PT DAILY TREATMENT NOTE 10-18 Patient Name: Heavenly Pringle Date:3/20/2019 : 1955 [x]  Patient  Verified Payor: BLUE CROSS / Plan: [facility] PPO / Product Type: PPO / In time:4:00  Out time:4:42 Total Treatment Time (min): 42 Visit #: 1 of  Medicare/BCBS Only Total Timed Codes (min):  32 1:1 Treatment Time:  32 Treatment Area: Pain in left shoulder [M25.512] SUBJECTIVE Pain Level (0-10 scale): 3/10 Any medication changes, allergies to medications, adverse drug reactions, diagnosis change, or new procedure performed?: [x] No    [] Yes (see summary sheet for update) Subjective functional status/changes:   [] No changes reported \"Still use the sling occasionally because it gets sore. \" OBJECTIVE Modality rationale: decrease inflammation and decrease pain to improve the patients ability to perform ADL's. Min Type Additional Details  
 [] Estim:  []Unatt       []IFC  []Premod []Other:  []w/ice   []w/heat Position: Location:  
 [] Estim: []Att    []TENS instruct  []NMES []Other:  []w/US   []w/ice   []w/heat Position: Location:  
 []  Traction: [] Cervical       []Lumbar 
                     [] Prone          []Supine []Intermittent   []Continuous Lbs: 
[] before manual 
[] after manual  
 []  Ultrasound: []Continuous   [] Pulsed []1MHz   []3MHz W/cm2: 
Location:  
 []  Iontophoresis with dexamethasone Location: [] Take home patch  
[] In clinic  
10 [x]  Ice     []  heat 
[]  Ice massage 
[]  Laser  
[]  Anodyne Position: Seated Location: Left shoulder  
 []  Laser with stim 
[]  Other:  Position: Location:  
 []  Vasopneumatic Device Pressure:       [] lo [] med [] hi  
Temperature: [] lo [] med [] hi  
[] Skin assessment post-treatment:  []intact []redness- no adverse reaction 
  []redness  adverse reaction: 24 min Therapeutic Exercise:  [x] See flow sheet :  
Rationale: increase ROM, increase strength and increase proprioception to improve the patients ability to perform ADL's. 
 
8 min Manual Therapy:  Left ST/GH joint mobs grade III. Shoulder PROM, DTM Left UT and lev scap. Rationale: decrease pain, increase ROM and increase tissue extensibility to improve ease of don/doff clothing and self care. With 
 [x] TE 
 [] TA 
 [] neuro 
 [] other: Patient Education: [x] Review HEP [] Progressed/Changed HEP based on:  
[] positioning   [] body mechanics   [] transfers   [] heat/ice application   
[] other:   
 
Other Objective/Functional Measures: Added pulleys flexion and scaption for 2' each and finger ladder flexion 3 reps. Pain Level (0-10 scale) post treatment: 2.5/10 ASSESSMENT/Changes in Function: Tolerated new exercises well. PROM progressing. Instructed pt to perform pulleys at home if he has access to pulleys. Continue POC and update PRE's as tolerable. Patient will continue to benefit from skilled PT services to modify and progress therapeutic interventions, address functional mobility deficits, address ROM deficits, address strength deficits, analyze and address soft tissue restrictions and analyze and cue movement patterns to attain remaining goals. [x]  See Plan of Care 
[]  See progress note/recertification 
[]  See Discharge Summary Progress towards goals / Updated goals: 
Updated Goals: to be achieved in 4 weeks: 1. Patient will be able to improve FOTO score to 59 in order to demonstrate improvements in functional independence. Progressing 3/15/19 - FOTO score 42 2. Patient will be able to progress to [facility] phase void of pain in order to improve functional mobility. - Initiated AAROM phase. 3/20/2019 3.  Patient will be able to improve left shoulder PROM flexion to 160 degrees in order to improve functional mobility. Progressing 3/13/19 - Left shoulder PROM flexion - 140 degrees   
4. Patient will be able to improve left shoulder AROM flexion to 100 degrees (when able per protocol) in order to improve ease of reaching a shelf shoulder height. Will assess when able per protocol   PLAN 
[]  Upgrade activities as tolerated     [x]  Continue plan of care 
[]  Update interventions per flow sheet      
[]  Discharge due to:_ 
[]  Other:_   
 
Rm Jacome PTA 3/20/2019  3:56 PM 
 
Future Appointments Date Time Provider Tawana Grajeda 3/20/2019  4:00 PM Ashley Wang PTA [facility]  
3/22/2019  4:00 PM Diego Easley, PT [facility]  
3/27/2019  4:00 PM Ashley Wang PTA [facility]  
3/29/2019  4:00 PM Diego Easley, PT [facility]  
4/3/2019  4:00 PM Diego Easley, PT [facility]  
4/5/2019  4:00 PM Diego Easley, PT [facility]

## 2019-03-22 ENCOUNTER — HOSPITAL ENCOUNTER (OUTPATIENT)
Dept: PHYSICAL THERAPY | Age: 64
Discharge: HOME | End: 2019-03-22
Payer: COMMERCIAL

## 2019-03-22 PROCEDURE — 97140 MANUAL THERAPY 1/> REGIONS: CPT

## 2019-03-22 PROCEDURE — 97110 THERAPEUTIC EXERCISES: CPT

## 2019-03-22 NOTE — PROGRESS NOTES
PT DAILY TREATMENT NOTE 10-18 Patient Name: Angelica Pineda Date:3/22/2019 : 1955 [x]  Patient  Verified Payor: BLUE CROSS / Plan: [facility] PPO / Product Type: PPO / In time:4:00  Out time:4:39 Total Treatment Time (min): 39 Visit #: 2 of  Medicare/BCBS Only Total Timed Codes (min):  29 1:1 Treatment Time:  34 Treatment Area: Pain in left shoulder [M25.512] SUBJECTIVE Pain Level (0-10 scale): 2 Any medication changes, allergies to medications, adverse drug reactions, diagnosis change, or new procedure performed?: [x] No    [] Yes (see summary sheet for update) Subjective functional status/changes:   [x] No changes reported OBJECTIVE Modality rationale: decrease edema, decrease inflammation and decrease pain to improve the patients ability to perform future task with improved ease. Min Type Additional Details  
 [] Estim:  []Unatt       []IFC  []Premod []Other:  []w/ice   []w/heat Position: Location:  
 [] Estim: []Att    []TENS instruct  []NMES []Other:  []w/US   []w/ice   []w/heat Position: Location:  
 []  Traction: [] Cervical       []Lumbar 
                     [] Prone          []Supine []Intermittent   []Continuous Lbs: 
[] before manual 
[] after manual  
 []  Ultrasound: []Continuous   [] Pulsed []1MHz   []3MHz W/cm2: 
Location:  
 []  Iontophoresis with dexamethasone Location: [] Take home patch  
[] In clinic  
10 [x]  Ice     []  heat 
[]  Ice massage 
[]  Laser  
[]  Anodyne Position: seated Location: Left shoulder  
 []  Laser with stim 
[]  Other:  Position: Location:  
 []  Vasopneumatic Device Pressure:       [] lo [] med [] hi  
Temperature: [] lo [] med [] hi  
[] Skin assessment post-treatment:  []intact []redness- no adverse reaction 
  []redness  adverse reaction: 21 min Therapeutic Exercise:  [x] See flow sheet :  
Rationale: increase ROM and increase strength to improve the patients ability to perform ADLs with improved ease. 8 min Manual Therapy:  Left shoulder PROM, scap mobs, STM left UT Rationale: decrease pain, increase ROM and increase tissue extensibility to improve patients functional mobility. With 
 [] TE 
 [] TA 
 [] neuro 
 [] other: Patient Education: [x] Review HEP [] Progressed/Changed HEP based on:  
[] positioning   [] body mechanics   [] transfers   [] heat/ice application   
[] other:   
 
Other Objective/Functional Measures: Added AROM elbow flexion/extension today. Pain Level (0-10 scale) post treatment: 0 
 
ASSESSMENT/Changes in Function: Patient continues to progress well toward established goals. Improved tolerance to AAROM. Plan to add AAROM wall slides at next visit. Patient will continue to benefit from skilled PT services to modify and progress therapeutic interventions, address functional mobility deficits, address ROM deficits, address strength deficits, analyze and address soft tissue restrictions, analyze and cue movement patterns, analyze and modify body mechanics/ergonomics and assess and modify postural abnormalities to attain remaining goals. []  See Plan of Care 
[]  See progress note/recertification 
[]  See Discharge Summary Progress towards goals / Updated goals: 
Updated Goals: to be achieved in 4 weeks: 
            0. YBYLQKT will be able to improve FOTO score to 59 in order to demonstrate improvements in functional independence. Progressing 3/15/19 - FOTO score 42 2. Patient will be able to progress to [facility] phase void of pain in order to improve functional mobility. - Initiated AAROM phase. 3/20/2019 3.  Patient will be able to improve left shoulder PROM flexion to 160 degrees in order to improve functional mobility. Progressing 3/13/19 - Left shoulder PROM flexion - 140 degrees   
 4. Patient will be able to improve left shoulder AROM flexion to 100 degrees (when able per protocol) in order to improve ease of reaching a shelf shoulder height. Will assess when able per protocol   PLAN 
[]  Upgrade activities as tolerated     []  Continue plan of care 
[]  Update interventions per flow sheet      
[]  Discharge due to:_ 
[]  Other:_ Radha Weathers, NANCY 3/22/2019  4:06 PM 
 
Future Appointments Date Time Provider Tawana Grajeda 3/27/2019  4:00 PM [facility]  
3/29/2019  4:00 PM Dot Picket, PT [facility]  
4/3/2019  4:00 PM Dot Picket, PT [facility]  
4/5/2019  4:00 PM Dot Picket, PT [facility]

## 2019-03-25 ENCOUNTER — APPOINTMENT (OUTPATIENT)
Dept: PHYSICAL THERAPY | Age: 64
End: 2019-03-25
Payer: COMMERCIAL

## 2019-03-27 ENCOUNTER — HOSPITAL ENCOUNTER (OUTPATIENT)
Dept: PHYSICAL THERAPY | Age: 64
Discharge: HOME | End: 2019-03-27
Payer: COMMERCIAL

## 2019-03-27 PROCEDURE — 97110 THERAPEUTIC EXERCISES: CPT

## 2019-03-27 PROCEDURE — 97140 MANUAL THERAPY 1/> REGIONS: CPT

## 2019-03-27 PROCEDURE — 97016 VASOPNEUMATIC DEVICE THERAPY: CPT

## 2019-03-27 NOTE — PROGRESS NOTES
PT DAILY TREATMENT NOTE 10-18 Patient Name: Marquis Linn Date:3/27/2019 : 1955 [x]  Patient  Verified Payor: BLUE CROSS / Plan: [facility] PPO / Product Type: PPO / In time:4:00  Out time:4:41 Total Treatment Time (min): 41 Visit #: 3 of  Medicare/BCBS Only Total Timed Codes (min):  31 1:1 Treatment Time:  31  
 
 
Treatment Area: Pain in left shoulder [M25.512] SUBJECTIVE Pain Level (0-10 scale): 1/10 Any medication changes, allergies to medications, adverse drug reactions, diagnosis change, or new procedure performed?: [x] No    [] Yes (see summary sheet for update) Subjective functional status/changes:   [x] No changes reported \"Doing okay. \" OBJECTIVE Modality rationale: decrease pain to improve the patients ability to perform ADL's. Min Type Additional Details  
 [] Estim:  []Unatt       []IFC  []Premod []Other:  []w/ice   []w/heat Position: Location:  
 [] Estim: []Att    []TENS instruct  []NMES []Other:  []w/US   []w/ice   []w/heat Position: Location:  
 []  Traction: [] Cervical       []Lumbar 
                     [] Prone          []Supine []Intermittent   []Continuous Lbs: 
[] before manual 
[] after manual  
 []  Ultrasound: []Continuous   [] Pulsed []1MHz   []3MHz W/cm2: 
Location:  
 []  Iontophoresis with dexamethasone Location: [] Take home patch  
[] In clinic  
10 [x]  Ice     []  heat 
[]  Ice massage 
[]  Laser  
[]  Anodyne Position: Seated Location: Left shoulder  
 []  Laser with stim 
[]  Other:  Position: Location:  
 []  Vasopneumatic Device Pressure:       [] lo [] med [] hi  
Temperature: [] lo [] med [] hi  
[] Skin assessment post-treatment:  []intact []redness- no adverse reaction 
  []redness  adverse reaction:  
 
23 min Therapeutic Exercise:  [x] See flow sheet :  
 Rationale: increase ROM, increase strength and increase proprioception to improve the patients ability to perform ADL's. 
 
8 min Manual Therapy:  Left ST joint mobs. STM Left UT, lev scap, rhomboids. Shoulder PROM per protocol. Rationale: decrease pain, increase ROM and increase tissue extensibility to improve ease of performing ADL's and OH activities. With 
 [x] TE 
 [] TA 
 [] neuro 
 [] other: Patient Education: [x] Review HEP [] Progressed/Changed HEP based on:  
[] positioning   [] body mechanics   [] transfers   [] heat/ice application   
[] other:   
 
Other Objective/Functional Measures: Added wall slides (B) 10x, discomfort when attempting Left unilateral.  
 
Pain Level (0-10 scale) post treatment: 1-2/10 ASSESSMENT/Changes in Function: PROM progressing slowly towards LTG's. PT reports continued HEP compliance. Progressing as tolerable with AAROM phase. Continue PT to further increase strength and ease of performing ADL's. Patient will continue to benefit from skilled PT services to modify and progress therapeutic interventions, address functional mobility deficits, address ROM deficits, address strength deficits, analyze and address soft tissue restrictions and analyze and cue movement patterns to attain remaining goals. [x]  See Plan of Care 
[]  See progress note/recertification 
[]  See Discharge Summary Progress towards goals / Updated goals: 
Updated Goals: to be achieved in 4 weeks: 
            3. RDXSUTS will be able to improve FOTO score to 59 in order to demonstrate improvements in functional independence. Progressing 3/15/19 - FOTO score 42 2. Patient will be able to progress to AAROM phase void of pain in order to improve functional mobility. - Initiated AAROM phase. 3/20/2019 3.  Patient will be able to improve left shoulder PROM flexion to 160 degrees in order to improve functional mobility. Progressing 3/13/19 - Left shoulder PROM flexion - 140 degrees   
 4. Patient will be able to improve left shoulder AROM flexion to 100 degrees (when able per protocol) in order to improve ease of reaching a shelf shoulder height. Will assess when able per protocol   PLAN 
[]  Upgrade activities as tolerated     [x]  Continue plan of care 
[]  Update interventions per flow sheet      
[]  Discharge due to:_ 
[]  Other:_   
 
Kavitha Ramesh, ALEXANDER 3/27/2019  4:09 PM 
 
Future Appointments Date Time Provider Tawana Grajeda 3/29/2019  4:00 PM [facility]  
4/3/2019  4:00 PM Gian Roa, PT [facility]  
4/5/2019  4:00 PM Gian Roa, PT [facility]

## 2019-03-29 ENCOUNTER — HOSPITAL ENCOUNTER (OUTPATIENT)
Dept: PHYSICAL THERAPY | Age: 64
Discharge: HOME | End: 2019-03-29
Payer: COMMERCIAL

## 2019-03-29 PROCEDURE — 97110 THERAPEUTIC EXERCISES: CPT

## 2019-03-29 PROCEDURE — 97140 MANUAL THERAPY 1/> REGIONS: CPT

## 2019-03-29 NOTE — PROGRESS NOTES
PT DAILY TREATMENT NOTE 10-18 Patient Name: Marcell Del Rosario Date:3/29/2019 : 1955 [x]  Patient  Verified Payor: BLUE CROSS / Plan: [facility] PPO / Product Type: PPO / In time:4:06  Out time:4:41 Total Treatment Time (min): 35 Visit #: 4 of  Medicare/BCBS Only Total Timed Codes (min):  25 1:1 Treatment Time:  25 Treatment Area: Pain in left shoulder [M25.512] SUBJECTIVE Pain Level (0-10 scale): 1-2 Any medication changes, allergies to medications, adverse drug reactions, diagnosis change, or new procedure performed?: [x] No    [] Yes (see summary sheet for update) Subjective functional status/changes:   [] No changes reported \"A little achy, that's it\". OBJECTIVE Modality rationale: decrease edema, decrease inflammation and decrease pain to improve the patients ability to perform future ADLs with improved ease. Min Type Additional Details  
 [] Estim:  []Unatt       []IFC  []Premod []Other:  []w/ice   []w/heat Position: Location:  
 [] Estim: []Att    []TENS instruct  []NMES []Other:  []w/US   []w/ice   []w/heat Position: Location:  
 []  Traction: [] Cervical       []Lumbar 
                     [] Prone          []Supine []Intermittent   []Continuous Lbs: 
[] before manual 
[] after manual  
 []  Ultrasound: []Continuous   [] Pulsed []1MHz   []3MHz W/cm2: 
Location:  
 []  Iontophoresis with dexamethasone Location: [] Take home patch  
[] In clinic  
10 [x]  Ice     []  heat 
[]  Ice massage 
[]  Laser  
[]  Anodyne Position: seated Location: left shoulder  
 []  Laser with stim 
[]  Other:  Position: Location:  
 []  Vasopneumatic Device Pressure:       [] lo [] med [] hi  
Temperature: [] lo [] med [] hi  
[] Skin assessment post-treatment:  []intact []redness- no adverse reaction 
  []redness  adverse reaction: 17 min Therapeutic Exercise:  [x] See flow sheet :  
Rationale: increase ROM and increase strength to improve the patients ability to perform ADLs with improved ease. 8 min Manual Therapy:  Left shoulder PROM, scap mobs, gentle GH joint mobs Rationale: decrease pain, increase ROM and increase tissue extensibility to improve patients functional mobility. With 
 [] TE 
 [] TA 
 [] neuro 
 [] other: Patient Education: [x] Review HEP [] Progressed/Changed HEP based on:  
[] positioning   [] body mechanics   [] transfers   [] heat/ice application   
[] other:   
 
Other Objective/Functional Measures: Increased reps on finger ladder AAROM exercise. Pain Level (0-10 scale) post treatment: 1 ASSESSMENT/Changes in Function: Minimal pain notes with AAROM exercises. Patient tolerates increased reps during session. Good PROM. Plan to continue progressing phase 2 per protocol. Patient will continue to benefit from skilled PT services to modify and progress therapeutic interventions, address functional mobility deficits, address ROM deficits, address strength deficits, analyze and address soft tissue restrictions, analyze and cue movement patterns, analyze and modify body mechanics/ergonomics and assess and modify postural abnormalities to attain remaining goals. [x]  See Plan of Care 
[]  See progress note/recertification 
[]  See Discharge Summary Progress towards goals / Updated goals: 
Updated Goals: to be achieved in 4 weeks: 
  1. Patient will be able to improve FOTO score to 59 in order to demonstrate improvements in functional independence. Progressing 3/15/19 - FOTO score 42 2. Patient will be able to progress to AAROM phase void of pain in order to improve functional mobility. - Initiated AAROM phase. 3/20/2019 3.  Patient will be able to improve left shoulder PROM flexion to 160 degrees in order to improve functional mobility. Progressing 3/13/19 - Left shoulder PROM flexion - 140 degrees   
4. Patient will be able to improve left shoulder AROM flexion to 100 degrees (when able per protocol) in order to improve ease of reaching a shelf shoulder height. Will assess when able per protocol   PLAN 
[]  Upgrade activities as tolerated     [x]  Continue plan of care 
[]  Update interventions per flow sheet      
[]  Discharge due to:_ 
[]  Other:_ Dori Ferrera, PT 3/29/2019  4:08 PM 
 
Future Appointments Date Time Provider Tawana Grajeda 4/3/2019  4:00 PM [facility]  
4/5/2019  4:00 PM [facility]uciano Congress[facility]

## 2019-04-01 ENCOUNTER — APPOINTMENT (OUTPATIENT)
Dept: PHYSICAL THERAPY | Age: 64
End: 2019-04-01
Payer: COMMERCIAL

## 2019-04-03 ENCOUNTER — HOSPITAL ENCOUNTER (OUTPATIENT)
Dept: PHYSICAL THERAPY | Age: 64
Discharge: HOME | End: 2019-04-03
Payer: COMMERCIAL

## 2019-04-03 ENCOUNTER — APPOINTMENT (OUTPATIENT)
Dept: PHYSICAL THERAPY | Age: 64
End: 2019-04-03
Payer: COMMERCIAL

## 2019-04-03 PROCEDURE — 97140 MANUAL THERAPY 1/> REGIONS: CPT

## 2019-04-03 PROCEDURE — 97110 THERAPEUTIC EXERCISES: CPT

## 2019-04-03 NOTE — PROGRESS NOTES
PT DAILY TREATMENT NOTE 10-18 Patient Name: Buffy Otto Date:4/3/2019 : 1955 [x]  Patient  Verified Payor: BLUE CROSS / Plan: [facility] PPO / Product Type: PPO / In time:4:00  Out time:4:45 Total Treatment Time (min): 45 Visit #: 5 of  Medicare/BCBS Only Total Timed Codes (min):  35 1:1 Treatment Time:  30 Treatment Area: Pain in left shoulder [M25.512] SUBJECTIVE Pain Level (0-10 scale): 2 Any medication changes, allergies to medications, adverse drug reactions, diagnosis change, or new procedure performed?: [x] No    [] Yes (see summary sheet for update) Subjective functional status/changes:   [] No changes reported \"More pain today than I have been all week\". OBJECTIVE Modality rationale: decrease edema, decrease inflammation and decrease pain to improve the patients ability to perform future ADLs with improved ease. Min Type Additional Details  
 [] Estim:  []Unatt       []IFC  []Premod []Other:  []w/ice   []w/heat Position: Location:  
 [] Estim: []Att    []TENS instruct  []NMES []Other:  []w/US   []w/ice   []w/heat Position: Location:  
 []  Traction: [] Cervical       []Lumbar 
                     [] Prone          []Supine []Intermittent   []Continuous Lbs: 
[] before manual 
[] after manual  
 []  Ultrasound: []Continuous   [] Pulsed []1MHz   []3MHz W/cm2: 
Location:  
 []  Iontophoresis with dexamethasone Location: [] Take home patch  
[] In clinic  
10 [x]  Ice     []  heat 
[]  Ice massage 
[]  Laser  
[]  Anodyne Position: Location:  
 []  Laser with stim 
[]  Other:  Position: Location:  
 []  Vasopneumatic Device Pressure:       [] lo [] med [] hi  
Temperature: [] lo [] med [] hi  
[] Skin assessment post-treatment:  []intact []redness- no adverse reaction 
  []redness  adverse reaction: 27 min Therapeutic Exercise:  [x] See flow sheet :  
Rationale: increase ROM and increase strength to improve the patients ability to perform ADLs with improved ease. 8 min Manual Therapy:   Left shoulder PROM, scap mobs, gentle GH joint mobs Rationale: decrease pain, increase ROM and increase tissue extensibility to improve patients functional mobility. With 
 [] TE 
 [] TA 
 [] neuro 
 [] other: Patient Education: [x] Review HEP [] Progressed/Changed HEP based on:  
[] positioning   [] body mechanics   [] transfers   [] heat/ice application   
[] other:   
 
Other Objective/Functional Measures: Added wand shoulder flexion today Pain Level (0-10 scale) post treatment: 2 
 
ASSESSMENT/Changes in Function: Patient is progressing well with AAROM exercises. Plan to gradually integrate AROM exercises per protocol. Patient will continue to benefit from skilled PT services to modify and progress therapeutic interventions, address functional mobility deficits, address ROM deficits, address strength deficits, analyze and address soft tissue restrictions, analyze and cue movement patterns, analyze and modify body mechanics/ergonomics and assess and modify postural abnormalities to attain remaining goals. [x]  See Plan of Care 
[]  See progress note/recertification 
[]  See Discharge Summary Progress towards goals / Updated goals: 
Updated Goals: to be achieved in 4 weeks: 
  1. Patient will be able to improve FOTO score to 59 in order to demonstrate improvements in functional independence. Progressing 3/15/19 - FOTO score 42 2. Patient will be able to progress to AAROM phase void of pain in order to improve functional mobility. - Initiated AAROM phase. 3/20/2019 3.  Patient will be able to improve left shoulder PROM flexion to 160 degrees in order to improve functional mobility. Progressing 3/13/19 - Left shoulder PROM flexion - 140 degrees   
 4. Patient will be able to improve left shoulder AROM flexion to 100 degrees (when able per protocol) in order to improve ease of reaching a shelf shoulder height. Will assess when able per protocol   PLAN 
[]  Upgrade activities as tolerated     [x]  Continue plan of care 
[]  Update interventions per flow sheet      
[]  Discharge due to:_ 
[]  Other:_ Wilman Bell PT 4/3/2019  4:09 PM 
 
Future Appointments Date Time Provider Tawana Grajeda 4/5/2019  4:00 PM Jhony Cash, PT [facility]

## 2019-04-05 ENCOUNTER — HOSPITAL ENCOUNTER (OUTPATIENT)
Dept: PHYSICAL THERAPY | Age: 64
Discharge: HOME | End: 2019-04-05
Payer: COMMERCIAL

## 2019-04-05 ENCOUNTER — APPOINTMENT (OUTPATIENT)
Dept: PHYSICAL THERAPY | Age: 64
End: 2019-04-05
Payer: COMMERCIAL

## 2019-04-05 PROCEDURE — 97110 THERAPEUTIC EXERCISES: CPT

## 2019-04-05 PROCEDURE — 97140 MANUAL THERAPY 1/> REGIONS: CPT

## 2019-04-05 NOTE — PROGRESS NOTES
PT DAILY TREATMENT NOTE 10-18 Patient Name: Quan Wiseman Date:2019 : 1955 [x]  Patient  Verified Payor: BLUE CROSS / Plan: [facility] PPO / Product Type: PPO / In time:3:59  Out time:4:43 Total Treatment Time (min): 44 Visit #: 6 of  Medicare/BCBS Only Total Timed Codes (min):  34 1:1 Treatment Time:  32 Treatment Area: Pain in left shoulder [M25.512] SUBJECTIVE Pain Level (0-10 scale): 2 Any medication changes, allergies to medications, adverse drug reactions, diagnosis change, or new procedure performed?: [x] No    [] Yes (see summary sheet for update) Subjective functional status/changes:   [x] No changes reported OBJECTIVE Modality rationale: decrease edema, decrease inflammation and decrease pain to improve the patients ability to perform ADLs with improved ease. Min Type Additional Details  
 [] Estim:  []Unatt       []IFC  []Premod []Other:  []w/ice   []w/heat Position: Location:  
 [] Estim: []Att    []TENS instruct  []NMES []Other:  []w/US   []w/ice   []w/heat Position: Location:  
 []  Traction: [] Cervical       []Lumbar 
                     [] Prone          []Supine []Intermittent   []Continuous Lbs: 
[] before manual 
[] after manual  
 []  Ultrasound: []Continuous   [] Pulsed []1MHz   []3MHz W/cm2: 
Location:  
 []  Iontophoresis with dexamethasone Location: [] Take home patch  
[] In clinic  
10 [x]  Ice     []  heat 
[]  Ice massage 
[]  Laser  
[]  Anodyne Position: seated Location: Left shoulder  
 []  Laser with stim 
[]  Other:  Position: Location:  
 []  Vasopneumatic Device Pressure:       [] lo [] med [] hi  
Temperature: [] lo [] med [] hi  
[] Skin assessment post-treatment:  []intact []redness- no adverse reaction 
  []redness  adverse reaction:  
 
 
26 min Therapeutic Exercise:  [x] See flow sheet :  
 Rationale: increase ROM and increase strength to improve the patients ability to perform ADLs with improved ease. 8 min Manual Therapy:   Left shoulder PROM, scap mobs, gentle GH joint mobs Rationale: decrease pain, increase ROM and increase tissue extensibility to improve patients functional mobility. With 
 [] TE 
 [] TA 
 [] neuro 
 [] other: Patient Education: [x] Review HEP [] Progressed/Changed HEP based on:  
[] positioning   [] body mechanics   [] transfers   [] heat/ice application   
[] other:   
 
Other Objective/Functional Measures: Added sidelying shoulder flexion, abduction and ER Pain Level (0-10 scale) post treatment: 1 ASSESSMENT/Changes in Function: Improvements in left shoulder AAROM. Good tolerance to sidelying exercises, reporting no increase in pain. Plan to continue progressing AAROM/AROM phase per protocol. Patient will continue to benefit from skilled PT services to modify and progress therapeutic interventions, address functional mobility deficits, address ROM deficits, address strength deficits, analyze and address soft tissue restrictions, analyze and cue movement patterns, analyze and modify body mechanics/ergonomics and assess and modify postural abnormalities to attain remaining goals. [x]  See Plan of Care 
[]  See progress note/recertification 
[]  See Discharge Summary Progress towards goals / Updated goals: 
Updated Goals: to be achieved in 4 weeks: 
  1. Patient will be able to improve FOTO score to 59 in order to demonstrate improvements in functional independence. Progressing 3/15/19 - FOTO score 42 2. Patient will be able to progress to AAROM phase void of pain in order to improve functional mobility. - Initiated AAROM phase. 3/20/2019 3.  Patient will be able to improve left shoulder PROM flexion to 160 degrees in order to improve functional mobility. Progressing 3/13/19 - Left shoulder PROM flexion - 140 degrees   
 4. Patient will be able to improve left shoulder AROM flexion to 100 degrees (when able per protocol) in order to improve ease of reaching a shelf shoulder height. Will assess when able per protocol. Left AAROM flexion - 144 degrees.     
 
PLAN 
[]  Upgrade activities as tolerated     [x]  Continue plan of care 
[]  Update interventions per flow sheet      
[]  Discharge due to:_ 
[]  Other:_ Yasmeen Day, PT 4/5/2019  4:00 PM 
 
Future Appointments Date Time Provider Tawana Grajeda 4/8/2019  2:30 PM Dennie Kerbs, [facility]  
4/10/2019  4:30 PM Dennis Burr, PTA [facility]  
4/15/2019  5:00 PM Dennie Kerbs, PTA [facility]  
4/17/2019  4:30 PM Dennie Kerbs, PTA [facility]  
4/24/2019  4:30 PM Dennie Kerbs, PTA [facility]  
4/26/2019  4:00 PM Roland Sicard, PT [facility]

## 2019-04-10 ENCOUNTER — HOSPITAL ENCOUNTER (OUTPATIENT)
Dept: PHYSICAL THERAPY | Age: 64
Discharge: HOME | End: 2019-04-10
Payer: COMMERCIAL

## 2019-04-10 PROCEDURE — 97110 THERAPEUTIC EXERCISES: CPT

## 2019-04-10 PROCEDURE — 97140 MANUAL THERAPY 1/> REGIONS: CPT

## 2019-04-10 NOTE — PROGRESS NOTES
PT DAILY TREATMENT NOTE 10-18 Patient Name: Pearl Gottron Date:4/10/2019 : 1955 [x]  Patient  Verified Payor: BLUE CROSS / Plan: [facility] PPO / Product Type: PPO / In time:4:25  Out time:5:23 Total Treatment Time (min): 58 Visit #: 7 of  Medicare/BCBS Only Total Timed Codes (min):  48 1:1 Treatment Time:  45  
 
 
Treatment Area: Pain in left shoulder [M25.512] SUBJECTIVE Pain Level (0-10 scale): 2 Any medication changes, allergies to medications, adverse drug reactions, diagnosis change, or new procedure performed?: [x] No    [] Yes (see summary sheet for update) Subjective functional status/changes:   [] No changes reported Pt reports being frustrated that healing is taking so long OBJECTIVE Modality rationale: decrease pain to improve the patients ability to perform daily tasks Min Type Additional Details  
 [] Estim:  []Unatt       []IFC  []Premod []Other:  []w/ice   []w/heat Position: Location:  
 [] Estim: []Att    []TENS instruct  []NMES []Other:  []w/US   []w/ice   []w/heat Position: Location:  
 []  Traction: [] Cervical       []Lumbar 
                     [] Prone          []Supine []Intermittent   []Continuous Lbs: 
[] before manual 
[] after manual  
 []  Ultrasound: []Continuous   [] Pulsed []1MHz   []3MHz W/cm2: 
Location:  
 []  Iontophoresis with dexamethasone Location: [] Take home patch  
[] In clinic  
10 [x]  Ice     []  heat 
[]  Ice massage 
[]  Laser  
[]  Anodyne Position: seated Location: left shoulder  
 []  Laser with stim 
[]  Other:  Position: Location:  
 []  Vasopneumatic Device Pressure:       [] lo [] med [] hi  
Temperature: [] lo [] med [] hi  
[] Skin assessment post-treatment:  []intact []redness- no adverse reaction 
  []redness  adverse reaction:  
 
 
40 min Therapeutic Exercise:  [x] See flow sheet :  
 Rationale: increase ROM and increase strength to improve the patients ability to perform ADLs 8 min Manual Therapy:  Left shoulder PROM, scap mobs, gentle GH joint mobs Rationale: decrease pain, increase ROM and increase tissue extensibility to improve functional mobility With 
 [] TE 
 [] TA 
 [] neuro 
 [] other: Patient Education: [x] Review HEP [] Progressed/Changed HEP based on:  
[] positioning   [] body mechanics   [] transfers   [] heat/ice application   
[] other:   
 
Other Objective/Functional Measures: Added uni wall wipes, UBE and incr'd reps per flow sheet Pain Level (0-10 scale) post treatment: 1 ASSESSMENT/Changes in Function: Pt performed well with therex today and is transitioning well into AROM phase. No reports of incr'd pain with added therex but demo's ms fatigue. Patient will continue to benefit from skilled PT services to modify and progress therapeutic interventions, address functional mobility deficits, address ROM deficits, address strength deficits, analyze and address soft tissue restrictions, analyze and cue movement patterns, analyze and modify body mechanics/ergonomics and assess and modify postural abnormalities to attain remaining goals. []  See Plan of Care 
[]  See progress note/recertification 
[]  See Discharge Summary Progress towards goals / Updated goals: 
Updated Goals: to be achieved in 4 weeks: 
  1. Patient will be able to improve FOTO score to 59 in order to demonstrate improvements in functional independence. Progressing 3/15/19 - FOTO score 42 2. Patient will be able to progress to AAROM phase void of pain in order to improve functional mobility. - Initiated AAROM phase. 3/20/2019 3.  Patient will be able to improve left shoulder PROM flexion to 160 degrees in order to improve functional mobility. Progressing 3/13/19 - Left shoulder PROM flexion - 140 degrees   
 4. Patient will be able to improve left shoulder AROM flexion to 100 degrees (when able per protocol) in order to improve ease of reaching a shelf shoulder height. Will assess when able per protocol. Left AAROM flexion - 144 degrees.    
 
 
 
PLAN 
[]  Upgrade activities as tolerated     [x]  Continue plan of care 
[]  Update interventions per flow sheet      
[]  Discharge due to:_ 
[]  Other:_ Chris Barnes, PTA 4/10/2019  4:19 PM 
 
Future Appointments Date Time Provider Tawana Grajeda 4/10/2019  4:30 PM Janell Mary PTA [facility]  
4/15/2019  5:00 PM Arnoldo Macario PTA [facility]  
4/17/2019  4:30 PM Arnoldo Macario PTA [facility]  
4/24/2019  4:30 PM Arnoldo Macario PTA [facility]  
4/26/2019  4:00 PM Teja Almonte, PT [facility]

## 2019-04-15 ENCOUNTER — HOSPITAL ENCOUNTER (OUTPATIENT)
Dept: PHYSICAL THERAPY | Age: 64
Discharge: HOME | End: 2019-04-15
Payer: COMMERCIAL

## 2019-04-15 PROCEDURE — 97140 MANUAL THERAPY 1/> REGIONS: CPT

## 2019-04-15 PROCEDURE — 97110 THERAPEUTIC EXERCISES: CPT

## 2019-04-15 NOTE — PROGRESS NOTES
PT DAILY TREATMENT NOTE 10-18 Patient Name: Jensen Vieira Date:4/15/2019 : 1955 [x]  Patient  Verified Payor: BLUE CROSS / Plan: [facility] PPO / Product Type: PPO / In time:5:00  Out time:6:02 Total Treatment Time (min): 62 Visit #: 8 of  Medicare/BCBS Only Total Timed Codes (min):  52 1:1 Treatment Time:  34 Treatment Area: Pain in left shoulder [M25.512] SUBJECTIVE Pain Level (0-10 scale): 210 Any medication changes, allergies to medications, adverse drug reactions, diagnosis change, or new procedure performed?: [x] No    [] Yes (see summary sheet for update) Subjective functional status/changes:   [] No changes reported \"Been doing okay. \" OBJECTIVE Modality rationale: decrease pain to improve the patients ability to perform ADL's. Min Type Additional Details  
 [] Estim:  []Unatt       []IFC  []Premod []Other:  []w/ice   []w/heat Position: Location:  
 [] Estim: []Att    []TENS instruct  []NMES []Other:  []w/US   []w/ice   []w/heat Position: Location:  
 []  Traction: [] Cervical       []Lumbar 
                     [] Prone          []Supine []Intermittent   []Continuous Lbs: 
[] before manual 
[] after manual  
 []  Ultrasound: []Continuous   [] Pulsed []1MHz   []3MHz W/cm2: 
Location:  
 []  Iontophoresis with dexamethasone Location: [] Take home patch  
[] In clinic  
10 [x]  Ice     []  heat 
[]  Ice massage 
[]  Laser  
[]  Anodyne Position: Seated Location: Left shoulder  
 []  Laser with stim 
[]  Other:  Position: Location:  
 []  Vasopneumatic Device Pressure:       [] lo [] med [] hi  
Temperature: [] lo [] med [] hi  
[] Skin assessment post-treatment:  []intact []redness- no adverse reaction 
  []redness  adverse reaction:  
 
44 min Therapeutic Exercise:  [x] See flow sheet :  
 Rationale: increase ROM and increase strength to improve the patients ability to perform ADL's and functional activities. 8 min Manual Therapy:  STM/DTM Left UT, lev scap. Left ST joint mobs, shoulder PROM. Rationale: decrease pain, increase ROM, increase tissue extensibility and decrease trigger points to improve ease of reaching out for objects and OH activities. With 
 [x] TE 
 [] TA 
 [] neuro 
 [] other: Patient Education: [x] Review HEP [] Progressed/Changed HEP based on:  
[] positioning   [] body mechanics   [] transfers   [] heat/ice application   
[] other:   
 
Other Objective/Functional Measures: Pt denied pain with exercises. Pain Level (0-10 scale) post treatment: 1/10 ASSESSMENT/Changes in Function: Updated HEP. Pt has 2 additional visits authorization, pt will be attending PT 1x a week for 2 weeks. Patient will continue to benefit from skilled PT services to modify and progress therapeutic interventions, address functional mobility deficits, address ROM deficits, address strength deficits, analyze and address soft tissue restrictions and analyze and cue movement patterns to attain remaining goals. [x]  See Plan of Care 
[]  See progress note/recertification 
[]  See Discharge Summary Progress towards goals / Updated goals: 
Updated Goals: to be achieved in 4 weeks: 
  1. Patient will be able to improve FOTO score to 59 in order to demonstrate improvements in functional independence. Progressing 3/15/19 - FOTO score 42 2. Patient will be able to progress to AAROM phase void of pain in order to improve functional mobility. - Initiated AAROM phase. 3/20/2019 3.  Patient will be able to improve left shoulder PROM flexion to 160 degrees in order to improve functional mobility. Progressing 3/13/19 - Left shoulder PROM flexion - 140 degrees   
4. Patient will be able to improve left shoulder AROM flexion to 100 degrees (when able per protocol) in order to improve ease of reaching a shelf shoulder height. Will assess when able per protocol. Left AAROM flexion - 144 degrees.    
 
PLAN 
[]  Upgrade activities as tolerated     [x]  Continue plan of care 
[]  Update interventions per flow sheet      
[]  Discharge due to:_ 
[]  Other:_   
 
Ragini Lomax PTA 4/15/2019  5:24 PM 
 
Future Appointments Date Time Provider Tawana Goddardi 4/24/2019  4:30 PM [facility]  
5/3/2019  5:30 PM Harish Levine PT [facility]

## 2019-04-17 ENCOUNTER — APPOINTMENT (OUTPATIENT)
Dept: PHYSICAL THERAPY | Age: 64
End: 2019-04-17
Payer: COMMERCIAL

## 2019-04-24 ENCOUNTER — APPOINTMENT (OUTPATIENT)
Dept: PHYSICAL THERAPY | Age: 64
End: 2019-04-24
Payer: COMMERCIAL

## 2019-04-26 ENCOUNTER — APPOINTMENT (OUTPATIENT)
Dept: PHYSICAL THERAPY | Age: 64
End: 2019-04-26
Payer: COMMERCIAL

## 2019-05-03 ENCOUNTER — HOSPITAL ENCOUNTER (OUTPATIENT)
Dept: PHYSICAL THERAPY | Age: 64
Discharge: HOME | End: 2019-05-03
Payer: COMMERCIAL

## 2019-05-03 PROCEDURE — 97110 THERAPEUTIC EXERCISES: CPT

## 2019-05-03 PROCEDURE — 97140 MANUAL THERAPY 1/> REGIONS: CPT

## 2019-05-03 NOTE — PROGRESS NOTES
PT DAILY TREATMENT NOTE 10-18 Patient Name: Alicia Ojeda Date:5/3/2019 : 1955 [x]  Patient  Verified Payor: BLUE CROSS / Plan: [facility] PPO / Product Type: PPO / In time:5:22  Out time:6:02 Total Treatment Time (min): 40 Visit #: 1 of 2 Medicare/BCBS Only Total Timed Codes (min):  40 1:1 Treatment Time:  40 Treatment Area: Pain in left shoulder [M25.512] SUBJECTIVE Pain Level (0-10 scale): 0 Any medication changes, allergies to medications, adverse drug reactions, diagnosis change, or new procedure performed?: [x] No    [] Yes (see summary sheet for update) Subjective functional status/changes:   [] No changes reported \"No pain today\" OBJECTIVE 32 min Therapeutic Exercise:  [x] See flow sheet :  
Rationale: increase ROM and increase strength to improve the patients ability to perform ADLs with improved ease. 8 min Manual Therapy:  Left shoulder PROM, ST joint mobs Rationale: increase ROM and increase tissue extensibility to improve ease of work related task. With 
 [] TE 
 [] TA 
 [] neuro 
 [] other: Patient Education: [x] Review HEP [] Progressed/Changed HEP based on:  
[] positioning   [] body mechanics   [] transfers   [] heat/ice application   
[] other:   
 
Other Objective/Functional Measures: Added standing shoulder flexion and scaption AROM today. Left shoulder AROM flexion 152 degrees Pain Level (0-10 scale) post treatment: 0 
 
ASSESSMENT/Changes in Function: Good tolerance to activity. Patient remains compliant with updated HEP. Improvements noted in left shoulder AROM in all directions. Plan to continue progressing AROM exercises until able to begin gentle strengthening exercises per protocol.     
 
Patient will continue to benefit from skilled PT services to modify and progress therapeutic interventions, address functional mobility deficits, address ROM deficits, address strength deficits, analyze and address soft tissue restrictions, analyze and cue movement patterns, analyze and modify body mechanics/ergonomics and assess and modify postural abnormalities to attain remaining goals. [x]  See Plan of Care 
[]  See progress note/recertification 
[]  See Discharge Summary Progress towards goals / Updated goals: 1. Patient will be able to improve FOTO score to 59 in order to demonstrate improvements in functional independence. Progressing 3/15/19 - FOTO score 42 2. Patient will be able to improve left shoulder AROM flexion to 100 degrees (when able per protocol) in order to improve ease of reaching a shelf shoulder height.  Goal  Met   5/3/19 - left shoulder AROM flexion 152 degrees 3. Patient will be independent with HEP at D/C in order to maintain progress achieved in physical therapy. PLAN 
[]  Upgrade activities as tolerated     [x]  Continue plan of care 
[]  Update interventions per flow sheet      
[]  Discharge due to:_ 
[]  Other:_ Kiara Burrell, PT 5/3/2019  5:28 PM 
 
Future Appointments Date Time Provider Tawana Grajeda 5/3/2019  5:30 PM [facility]  
5/8/2019  5:30 PM Corinda Aschoff, [facility]

## 2019-05-03 NOTE — PROGRESS NOTES
In [address]  Yasmeen [address] [place], [address] 
[phone] fax Physical Therapy Progress Note Patient name: Pam Davenport Computer of Care: 2019 Referral source: Grupo Pabon MD : 1955               
Medical Diagnosis: Pain in left shoulder [M25.512] Payor: [facility] / Plan: [facility] PPO / Product Type: PPO /  Onset Date: DOS: 19 Treatment Diagnosis: Left subscapularis repair, bicep tenodesis, SAD, and capsular release with manipulation Prior Hospitalization: see medical history Provider#: [id number] Medications: Verified on Patient summary List  
 Comorbidities: none reported 
 Prior Level of Function: (I) with all ADLs and recreational activities 
  
Visits from Start of Care: 28    Missed Visits: 2 Key Functional Changes: Patient is continuing to progress toward established goals. Good tolerance to AROM exercises. Patient reports minimal pain in left shoulder since start of care. Unable to initiate strengthening exercises per protocol.  
 
 1. Patient will be able to improve FOTO score to 59 in order to demonstrate improvements in functional independence. Progressing 3/15/19 - FOTO score 42 2. Patient will be able to progress to AAROM phase void of pain in order to improve functional mobility. - Initiated AAROM phase. 3/20/2019 3. Patient will be able to improve left shoulder PROM flexion to 160 degrees in order to improve functional mobility. Progressing 3/13/19 - Left shoulder PROM flexion - 140 degrees   
4. Patient will be able to improve left shoulder AROM flexion to 100 degrees (when able per protocol) in order to improve ease of reaching a shelf shoulder height. Will assess when able per protocol. Left AAROM flexion - 144 degrees.    
 
 
Updated Goals: to be achieved in 2 weeks: 1.  Patient will be able to improve FOTO score to 59 in order to demonstrate improvements in functional independence. Progressing 3/15/19 - FOTO score 42 2. Patient will be able to improve left shoulder AROM flexion to 100 degrees (when able per protocol) in order to improve ease of reaching a shelf shoulder height. Will assess when able per protocol. Left AAROM flexion - 144 degrees.    
3. Patient will be independent with HEP at D/C in order to maintain progress achieved in physical therapy. ASSESSMENT/RECOMMENDATIONS: Pt has 2 additional visits authorization, pt will be attending PT 1x a week for 2 weeks. Plan to continue progressing AROM exercises per protocol. [x]Continue therapy per initial plan/protocol at a frequency of  1-2 x per week for 2 weeks []Continue therapy with the following recommended changes:_____________________      _____________________________________________________________________ []Discontinue therapy progressing towards or have reached established goals []Discontinue therapy due to lack of appreciable progress towards goals []Discontinue therapy due to lack of attendance or compliance []Await Physician's recommendations/decisions regarding therapy []Other:________________________________________________________________ Thank you for this referral.   
Dejan Velasco, PT 5/3/2019 3:42 PM 
NOTE TO PHYSICIAN:  PLEASE COMPLETE THE ORDERS BELOW AND  
FAX TO [facility] [place] Physical Therapy: [phone] If you are unable to process this request in 24 hours please contact our office: [phone] ? I have read the above report and request that my patient continue as recommended. ? I have read the above report and request that my patient continue therapy with the following changes/special instructions:__________________________________________________________ ? I have read the above report and request that my patient be discharged from therapy. Physicians signature: ______________________________Date: ______Time:______

## 2019-05-08 ENCOUNTER — APPOINTMENT (OUTPATIENT)
Dept: PHYSICAL THERAPY | Age: 64
End: 2019-05-08
Payer: COMMERCIAL

## 2019-05-20 ENCOUNTER — HOSPITAL ENCOUNTER (OUTPATIENT)
Dept: PHYSICAL THERAPY | Age: 64
Discharge: HOME | End: 2019-05-20
Payer: COMMERCIAL

## 2019-05-20 PROCEDURE — 97110 THERAPEUTIC EXERCISES: CPT

## 2019-05-20 NOTE — PROGRESS NOTES
PT DISCHARGE DAILY NOTE AND [id number] Date:2019 Patient name: Pam Davenport Computer of Care: 2019 Referral source: Bonner, Tilford Phoenix, MD : 1955               
Medical Diagnosis: Pain in left shoulder [M25.512] Payor: [facility] / Plan: [facility] PPO / Product Type: PPO /  Onset Date: DOS: 19 Treatment Diagnosis: Left subscapularis repair, bicep tenodesis, SAD, and capsular release with manipulation Prior Hospitalization: see medical history Provider#: [id number] Medications: Verified on Patient summary List  
 Comorbidities: none reported 
 Prior Level of Function: (I) with all ADLs and recreational activities Visits from Start of Care: 30    Missed Visits: 2 Reporting Period : 19 to 19 Date:2019 : 1955 [x]  Patient  Verified Payor: BLUE CROSS / Plan: [facility] PPO / Product Type: PPO / In time:4:00  Out time:4:45 Total Treatment Time (min): 45 Visit #: 2 of 2 Medicare/BCBS Only Total Timed Codes (min):  45 1:1 Treatment Time:  45 SUBJECTIVE Pain Level (0-10 scale): 0 Any medication changes, allergies to medications, adverse drug reactions, diagnosis change, or new procedure performed?: [x] No    [] Yes (see summary sheet for update) Subjective functional status/changes:   [] No changes reported \"I feel good, I went to the doctor and they said I could start gentle strengthening exercises\". OBJECTIVE 45 min Therapeutic Exercise:  [x] See flow sheet :  
Rationale: increase ROM and increase strength to improve the patients ability to perform ADLs with improved ease. With 
 [] TE 
 [] TA 
 [] neuro 
 [] other: Patient Education: [x] Review HEP [] Progressed/Changed HEP based on:  
[] positioning   [] body mechanics   [] transfers   [] heat/ice application   
[] other:   
 
Other Objective/Functional Measures: FOTO score - 59 Left shoulder flexion - 165 degrees void of pain Pain Level (0-10 scale) post treatment: 0 Summary of Care: 
1. Patient will be able to improve FOTO score to 59 in order to demonstrate improvements in functional independence. Progressing 3/15/19 - FOTO score 42. Goal met - FOTO score 59 (5/20/19) 2. Patient will be able to improve left shoulder AROM flexion to 100 degrees (when able per protocol) in order to improve ease of reaching a shelf shoulder height.  Goal  Met   5/3/19 - left shoulder AROM flexion 152 degrees 3. Patient will be independent with HEP at D/C in order to maintain progress achieved in physical therapy. Goal met 5/20/19  
 
ASSESSMENT/Changes in Function: Plan to D/C patient at this time due to insurance limitations. Patient has progressed well with physical therapy has showed great improvements in left shoulder ROM. Patient was given updated HEP focusing on strengthening exercises. Thank you for this referral! 
   
PLAN Dane Schwarz, PT 5/20/2019  4:03 PM

## 2019-07-08 ENCOUNTER — HOSPITAL ENCOUNTER (OUTPATIENT)
Dept: PHYSICAL THERAPY | Age: 64
Discharge: HOME | End: 2019-07-08
Payer: COMMERCIAL

## 2019-07-08 PROCEDURE — 97162 PT EVAL MOD COMPLEX 30 MIN: CPT

## 2019-07-08 PROCEDURE — 97110 THERAPEUTIC EXERCISES: CPT

## 2019-07-08 NOTE — PROGRESS NOTES
PT DAILY TREATMENT NOTE 10-18    Patient Name: Catarino Evans  Date:2019  : 1955  [x]  Patient  Verified  Payor: BLUE CROSS / Plan: [facility] PPO / Product Type: PPO /    In time:2:15  Out time:2:51  Total Treatment Time (min): 36  Visit #: 1 of 4    Medicare/BCBS Only   Total Timed Codes (min):  21 1:1 Treatment Time:  36       Treatment Area: Knee pain, right [M25.561]    SUBJECTIVE  Pain Level (0-10 scale): 2  Any medication changes, allergies to medications, adverse drug reactions, diagnosis change, or new procedure performed?: [x] No    [] Yes (see summary sheet for update)  Subjective functional status/changes:   [] No changes reported  Patient is a 59 y.o male presenting with a chief complaint of right knee pain that began about 1 month ago after he stepped off a curb and hyperextended his right knee. Patient reports he noticed immediate swelling. Patient reports recent xrays showed arthritic changes, and recent MRI showed a partially torn medial meniscus. Patient states his pain is located along medial patella and inferior patella with increased pain during any weight bearing activities. Patient reports negotiating stairs has been a challenge for him especially when descending. OBJECTIVE    15 min [x]Eval                  []Re-Eval       21 min Therapeutic Exercise:  [x] See flow sheet :   Rationale: increase ROM and increase strength to improve the patients ability to perform ADLs.              With   [] TE   [] TA   [] neuro   [] other: Patient Education: [x] Review HEP    [] Progressed/Changed HEP based on:   [] positioning   [] body mechanics   [] transfers   [] heat/ice application    [] other:      Other Objective/Functional Measures: See IE     Pain Level (0-10 scale) post treatment: 2    ASSESSMENT/Changes in Function:  Patient presents with right knee AROM WNL, minimal pain report, increased circumferential edema, (+) apley compression test, (-) varus/valgus testing, (-) anterior and posterior drawer, decreased hip strength, limited tolerance to WB activities, and decreased muscular flexibility. Patient will benefit from skilled physical therapy in order to improve ease of ADLs      Patient will continue to benefit from skilled PT services to modify and progress therapeutic interventions, address functional mobility deficits, address ROM deficits, address strength deficits, analyze and address soft tissue restrictions, analyze and cue movement patterns and analyze and modify body mechanics/ergonomics to attain remaining goals. [x]  See Plan of Care  []  See progress note/recertification  []  See Discharge Summary         Progress towards goals / Updated goals:  Short Term Goals: To be accomplished in 2 weeks:               1. Patient will be independent and compliant with HEP in order to maximize therapeutic benefit. 2. Patient will be able to improve right hip abduction strength to 4+/5 in order to improve ease of weightbearing task. Long Term Goals: To be accomplished in 4 weeks:                1. Patient will be able to improve FOTO score to 67 in order to demonstrate improvements in functional independence. 2. Patient will be able to negotiate a flight of stairs without difficulty in order to improve ease of getting in and out of his house. 3. Patient will be able to improve right knee strength to 4+/5 in order to improve ease of ADLs. PLAN  []  Upgrade activities as tolerated     [x]  Continue plan of care  []  Update interventions per flow sheet       []  Discharge due to:_  []  Other:_      Bela Easley, PT 7/8/2019  2:40 PM    No future appointments.

## 2019-07-08 NOTE — PROGRESS NOTES
[facility]  [address]  [place], [address]  [phone] fax    Plan of Care/ Statement of Necessity for Physical Therapy Services    Patient name: Libertad Rodriguez Start of Care: 2019   Referral source: Cassandra Manuel MD : 1955    Medical Diagnosis: Knee pain, right [M25.561]  Payor: [facility] / Plan: [facility] PPO / Product Type: PPO /  Onset Date:1 month ago    Treatment Diagnosis: Right knee pain   Prior Hospitalization: see medical history Provider#: [id number]   Medications: Verified on Patient summary List    Comorbidities: HBP, arthritis   Prior Level of Function: (I) with all ADLs and employment     The Plan of Care and following information is based on the information from the initial evaluation. Assessment/ key information: Patient is a 59 y.o male presenting with a chief complaint of right knee pain that began about 1 month ago after he stepped off a curb and hyperextended his right knee. Patient reports he noticed immediate swelling. Patient reports recent xrays showed arthritic changes, and recent MRI showed a partially torn medial meniscus. Patient states his pain is located along medial patella and inferior patella with increased pain during any weight bearing activities. Patient reports negotiating stairs has been a challenge for him especially when descending. Patient presents with right knee AROM WNL, minimal pain report, increased circumferential edema, (+) apley compression test, (-) varus/valgus testing, (-) anterior and posterior drawer, decreased hip strength, limited tolerance to WB activities, and decreased muscular flexibility.   Patient will benefit from skilled physical therapy in order to improve ease of ADLs  Evaluation Complexity History MEDIUM  Complexity : 1-2 comorbidities / personal factors will impact the outcome/ POC ; Examination MEDIUM Complexity : 3 Standardized tests and measures addressing body structure, function, activity limitation and / or participation in recreation  ;Presentation MEDIUM Complexity : Evolving with changing characteristics  ; Clinical Decision Making MEDIUM Complexity : FOTO score of 26-74  Overall Complexity Rating: MEDIUM  Problem List: pain affecting function, decrease ROM, decrease strength, edema affecting function, impaired gait/ balance, decrease ADL/ functional abilitiies, decrease activity tolerance and decrease flexibility/ joint mobility   Treatment Plan may include any combination of the following: Therapeutic exercise, Neuromuscular re-education, Physical agent/modality, Gait/balance training, Manual therapy, Patient education and Functional mobility training  Patient / Family readiness to learn indicated by: asking questions, trying to perform skills and interest  Persons(s) to be included in education: patient (P)  Barriers to Learning/Limitations: None  Patient Goal (s): To avoid surgery  Patient Self Reported Health Status: fair  Rehabilitation Potential: good    Short Term Goals: To be accomplished in 2 weeks:   1. Patient will be independent and compliant with HEP in order to maximize therapeutic benefit. 2. Patient will be able to improve right hip abduction strength to 4+/5 in order to improve ease of weightbearing task. Long Term Goals: To be accomplished in 4 weeks:    1. Patient will be able to improve FOTO score to 67 in order to demonstrate improvements in functional independence. 2. Patient will be able to negotiate a flight of stairs without difficulty in order to improve ease of getting in and out of his house. 3. Patient will be able to improve right knee strength to 4+/5 in order to improve ease of ADLs. Frequency / Duration: Patient to be seen 1 times per week for 4 weeks.     Patient/ Caregiver education and instruction: Diagnosis, prognosis, activity modification and exercises   [x]  Plan of care has been reviewed with ALEXANDER Cox, PT 7/8/2019 2:40 PM    ________________________________________________________________________    I certify that the above Therapy Services are being furnished while the patient is under my care. I agree with the treatment plan and certify that this therapy is necessary.     Physician's Signature:____________Date:_________TIME:________    ** Signature, Date and Time must be completed for valid certification **    Please sign and return to [facility] [address]  [address]  [place], [facility] [address]  [phone] fax

## 2019-07-25 ENCOUNTER — APPOINTMENT (OUTPATIENT)
Dept: PHYSICAL THERAPY | Age: 64
End: 2019-07-25
Payer: COMMERCIAL

## 2019-07-31 ENCOUNTER — APPOINTMENT (OUTPATIENT)
Dept: PHYSICAL THERAPY | Age: 64
End: 2019-07-31
Payer: COMMERCIAL

## 2019-08-15 NOTE — PROGRESS NOTES
[facility]  [address]  [place], [address]  [phone] fax    Physical Therapy Discharge Summary    Patient name: Brenda Hernández Start of Care: 2019   Referral source: Joseph Richardson MD : 1955                Medical Diagnosis: Knee pain, right [M25.561]  Payor: [employer] / Plan: [facility] PPO / Product Type: PPO /  Onset Date:1 month ago                Treatment Diagnosis: Right knee pain   Prior Hospitalization: see medical history Provider#: [id number]   Medications: Verified on Patient summary List    Comorbidities: HBP, arthritis   Prior Level of Function: (I) with all ADLs and employment    Visits from Start of Care: 1    Missed Visits: 1  Reporting Period : 19 to 19    Summary of Care:  Unable to further assess progress towards goals at this time due to non-compliance/lack of attendance. She was seen for PT eval only and did not return to PT. DC at this time with no further instructions to the patient.   Thank you for this referral.      ASSESSMENT/RECOMMENDATIONS:  [x]Discontinue therapy: []Patient has reached or is progressing toward set goals      [x]Patient is non-compliant or has abdicated      []Due to lack of appreciable progress towards set goals    Robbie Willingham, PT 8/15/2019 1:06 PM

## 2020-06-24 NOTE — PROGRESS NOTES
PT DAILY TREATMENT NOTE 10-18 Patient Name: Makenna Piña Date:10/17/2018 : 1955 [x]  Patient  Verified Payor: BLUE CROSS / Plan: [facility] PPO / Product Type: PPO / In time:5;00  Out time:5;55 Total Treatment Time (min): 55 Visit #: 5 of 16 Medicare/BCBS Only Total Timed Codes (min):  45 1:1 Treatment Time:  45  
 
 
Treatment Area: Pain in left shoulder [M25.512] SUBJECTIVE Pain Level (0-10 scale): 3 Any medication changes, allergies to medications, adverse drug reactions, diagnosis change, or new procedure performed?: [x] No    [] Yes (see summary sheet for update) Subjective functional status/changes:   [] No changes reported I can't tell if I'm getting better. Don't see a change. I avoid using my arm when possible. OBJECTIVE Modality rationale: decrease pain to improve the patients ability to improve activity tolerance Min Type Additional Details  
 [] Estim:  []Unatt       []IFC  []Premod []Other:  []w/ice   []w/heat Position: Location:  
 [] Estim: []Att    []TENS instruct  []NMES []Other:  []w/US   []w/ice   []w/heat Position: Location:  
 []  Traction: [] Cervical       []Lumbar 
                     [] Prone          []Supine []Intermittent   []Continuous Lbs: 
[] before manual 
[] after manual  
 []  Ultrasound: []Continuous   [] Pulsed []1MHz   []3MHz W/cm2: 
Location:  
 []  Iontophoresis with dexamethasone Location: [] Take home patch  
[] In clinic  
10 [x]  Ice     []  heat 
[]  Ice massage 
[]  Laser  
[]  Anodyne Position:  sitting Location:  Left shoulder  
 []  Laser with stim 
[]  Other:  Position: Location:  
 []  Vasopneumatic Device Pressure:       [] lo [] med [] hi  
Temperature: [] lo [] med [] hi  
[x] Skin assessment post-treatment:  [x]intact []redness- no adverse reaction 
  []redness  adverse reaction: normal appearance , without tenderness upon palpation , no deformities , trachea midline , Thyroid normal size , no thyroid nodules , no masses , no JVD , thyroid nontender 30 min Therapeutic Exercise:  [] See flow sheet :  
Rationale: increase ROM and increase strength to improve the patients ability to improve ease of UE function, reach, lifting 15 min Manual Therapy:  TPR to supraspinatus, teres major. GHJ and scap mobs. Rationale: decrease pain, increase ROM and increase tissue extensibility to improve ease of function, reduce impingement With 
 [] TE 
 [] TA 
 [] neuro 
 [] other: Patient Education: [x] Review HEP [] Progressed/Changed HEP based on:  
[] positioning   [] body mechanics   [] transfers   [] heat/ice application   
[] other:   
 
Other Objective/Functional Measures:   
 
Pain Level (0-10 scale) post treatment:  0 
 
ASSESSMENT/Changes in Function: Demo good gains in AROM and strength (except abd at 4-/5) Patient will continue to benefit from skilled PT services to modify and progress therapeutic interventions, address functional mobility deficits, address ROM deficits, address strength deficits, analyze and address soft tissue restrictions, analyze and cue movement patterns, analyze and modify body mechanics/ergonomics, assess and modify postural abnormalities, address imbalance/dizziness and instruct in home and community integration to attain remaining goals. []  See Plan of Care 
[]  See progress note/recertification 
[]  See Discharge Summary Progress towards goals / Updated goals: STG#1: Patient will be compliant with HEP in order to progress towards long term goals. Status at last note: initiated HEP Current status: met - Pt reports compliance, updated today Long Term Goals: To be accomplished in 8 weeks: LTG #1: Patient will score 66 points on FOTO in order to indicate improved function. Status at last note: 46 points at initial evaluation FOTO 50  progressing LTG#2: Patient will achieve 5/5 gross left shoulder strength to increase ease of daily tasks. Status at last note: grossly 3+/5 shoulder flexion/ abduction IR/ ER, 4/5 elbow flexion/ ext Flexion 4+/5, IR/ER 5/5, ABD 4-/5 LTG#3: Patient will achieve full left shoulder ROM in all planes without pain in order to increase ease of overhead activities/dressing/lifting. Status at last note: 75% gross range limited by pain Flexion 165, abd/ER/IR WNL with tightness, no pain LTG#4: Patient will report an overall 60 % improvement in function to restore prior level of function. Status at last note: Will continue to assess throughout course of treatment 
  
PLAN [x]  Upgrade activities as tolerated     []  Continue plan of care 
[]  Update interventions per flow sheet      
[]  Discharge due to:_ 
[]  Other:_ Seymour Brittle, PTA 10/17/2018  5:11 PM 
 
Future Appointments Date Time Provider Tawana Grajeda 10/22/2018  5:00 PM Eddie, [address]  
10/24/2018  5:00 PM Adryan Carver [facility]  
10/29/2018  5:00 PM Eddie, [address]  
10/31/2018  5:00 PM Galilea Jaquez, PT [facility]

## 2022-12-21 ENCOUNTER — HOSPITAL ENCOUNTER (OUTPATIENT)
Dept: PHYSICAL THERAPY | Age: 67
Discharge: HOME | End: 2022-12-21
Payer: COMMERCIAL

## 2022-12-21 PROCEDURE — 97162 PT EVAL MOD COMPLEX 30 MIN: CPT

## 2022-12-21 PROCEDURE — 97530 THERAPEUTIC ACTIVITIES: CPT

## 2022-12-21 NOTE — PROGRESS NOTES
PT DAILY TREATMENT NOTE     Patient Name: Anabel Braga  Date:2022  : 1955  [x]  Patient  Verified  Payor: BLUE CROSS / Plan: [facility] PPO / Product Type: PPO /    In time:10:33  Out time:11:12  Total Treatment Time (min): 39  Visit #: 1 of 10    Medicare/BCBS Only   Total Timed Codes (min):  23 1:1 Treatment Time:  39       Treatment Area: Right shoulder pain [M25.511]    SUBJECTIVE  Pain Level (0-10 scale): 610  Any medication changes, allergies to medications, adverse drug reactions, diagnosis change, or new procedure performed?: [x] No    [] Yes (see summary sheet for update)  Subjective functional status/changes:   [] No changes reported    Chief Complaint: right shoulder pain  History/Mechanism of Injury: Pt was catching a gun safe that was falling off of the hand truck in 2022. Pt had immediate pain but thought the shoulder was strained so managed independently until pain continued to linger. Pt went to [facility] where imaging was taken, revealing right RCR, biceps rupture. Surgery performed on 22. Current Symptoms/Deficits: pain along right forearm, unable to move arm, spouse assists with dressing, spouse assists with donning/doffing sling but Pt mostly independent, sleeping in bed with bed reclined  Pain-  Current: 6/10     Worst: 8/10   Best: 3/10  Previous Treatment/Compliance: N/A  PMHx/Surgical Hx: Arthritis; HTN; hx left RCR   Work Hx: [employer], works from home, will be out until 2023  Living Situation: 2-story home with spouse  Hobbies: yard work, woodworking  Pt Goals: \"Use of arm again - right now its dead to me. \"      OBJECTIVE    16 min [x]Eval                  []Re-Eval     23 min Therapeutic Activity:  []  See flow sheet : Patient education on therapy assessment, prognosis, expectations for therapy sessions, patient goals, protocol review, dressing removal, and HEP.    Rationale: to improve the patients ability to adhere to HEP and therapy sessions for increased compliance when working toward therapy goals. With   [] TE   [x] TA   [] neuro   [] other: Patient Education: [x] Review HEP    [] Progressed/Changed HEP based on:   [] positioning   [] body mechanics   [] transfers   [] heat/ice application    [] other:      Other Objective/Functional Measures: FOTO 4 pts    Observation: dressing present, well-healing incisions with steri strips intact and no discharge noted  Palpation: TTP at surgical incision portals, biceps, extensor wad    Shoulder AROM/PROM:                                           AROM (deg)              PROM (deg)   Right Left Right Left   Flexion  180 119    Extension  70 NT    Scaption  180 115    ER at 45 Deg ABD  90 9    IR at 45 Deg ABD  70 63    Seated Flexion    --------------- ---------------     Functional ER: left T4  Functional IR: left T8    C/S Screening:  WNL - mild tightness from sling wear     Strength:   Right (/5) Left (/5)   GHJ   Flexion NT 5             Abduction NT 5             Extension NT 5             ER NT 5             IR NT 5   Upper Trapezius  NT 5   Middle/Lower Trap NT 5   Elbow Flexion NT 5   Elbow Extension NT 5    Strength NT 95.1     Joint Feel: empty end feel due to limited ROM, pain  Scapulohumeral Rhythm: mildly hypomobile    Reflexes/Sensation: intact sensation to light touch    Special Tests :      Right Left   [place]-Idris       Cross Arm     Speed's     Painful Arc     Sulcus     Apprehension     -   Right Left   Sulcus     Apprehension     Load + Shift     -   Right Left   ER Lag     Drop Arm     Empty Can     Belly Press     -   Right Left   Labral Shear       [place]'s     Biceps Load        -      Pain Level (0-10 scale) post treatment: 6/10    ASSESSMENT/Changes in Function: See POC    Patient will continue to benefit from skilled PT services to modify and progress therapeutic interventions, address functional mobility deficits, address ROM deficits, address strength deficits, analyze and address soft tissue restrictions, analyze and cue movement patterns, analyze and modify body mechanics/ergonomics, assess and modify postural abnormalities, address imbalance/dizziness and instruct in home and community integration to attain remaining goals.      [x]  See Plan of Care  []  See progress note/recertification  []  See Discharge Summary         Progress towards goals / Updated goals:  See POC    PLAN  [x]  Upgrade activities as tolerated     []  Continue plan of care  [x]  Update interventions per flow sheet       []  Discharge due to:_  []  Other:_      Antonio Jaquez PT 12/21/2022  10:28 AM    Future Appointments   Date Time Provider Tawana Grajeda   12/21/2022 10:30 AM Antonio Jaquez, PT [facility]

## 2022-12-21 NOTE — PROGRESS NOTES
[facility] - [facility] [place] [address]  [phone] fax  Plan of Care/ Statement of Necessity for Physical Therapy Services     Patient name: Leah Bain Start of Care: 2022   Referral source: Alejandrina Carney MD : 1955    Medical Diagnosis: Right shoulder pain [M25.511]  Payor: [place] / Plan: [facility][1] PPO / Product Type: PPO /  Onset Date:22 (surgery)    Treatment Diagnosis: Right Shoulder Pain   Prior Hospitalization: see medical history Provider#: [id number]   Medications: Verified on Patient summary List    Comorbidities: Arthritis; HTN; hx left RCR    Prior Level of Function: Works in [employer] for [email or web address]; lives in [place] with spouse; enjoys woodworking, yard work    [employer] of Care and following information is based on the information from the initial evaluation. Assessment/ key information: Pt is a 78 y.o. male who presents with c/o right shoulder pain, limited mobility, strength, S/P right shoulder arthroscopy SAD, Biceps tenodesis, RCR on 22. Pt presents with intact bandages and wearing sling with ABD pillow. Functional deficits include: unable to use right arm, requires assistance from spouse for bathing, dressing, donning sling, sleeps reclined in bed. Upon exam, Pt exhibited impaired right shoulder PROM of Flex 119 deg, Scaption 115 deg, ER at 45 deg - 9 deg, IR at 45 deg - 63 deg. Pt would benefit from skilled PT to address above deficits to improve Pt's function and ability to return to premorbid status without pain or difficulty.     Evaluation Complexity History MEDIUM  Complexity : 1-2 comorbidities / personal factors will impact the outcome/ POC ; Examination MEDIUM Complexity : 3 Standardized tests and measures addressing body structure, function, activity limitation and / or participation in recreation  ;Presentation MEDIUM Complexity : Evolving with changing characteristics  ; Clinical Decision Making HIGH Complexity : FOTO score of 1- 25   Overall Complexity Rating: MEDIUM  Problem List: pain affecting function, decrease ROM, decrease strength, edema affecting function, decrease ADL/ functional abilitiies, decrease activity tolerance, decrease flexibility/ joint mobility, and decrease transfer abilities   Treatment Plan may include any combination of the following: Therapeutic exercise, Neuromuscular reeducation, Manual therapy, Therapeutic activity, Self care/home management, Electric stim unattended , Ultrasound, Electric stim attended, Needle insertion w/o injection (1 or 2 muscles), and Needle insertion w/o injection (3+ muscles)  Patient / Family readiness to learn indicated by: asking questions and interest  Persons(s) to be included in education: patient (P) and family support person (FSP);list spouse  Barriers to Learning/Limitations: None  Patient Goal (s): Use of arm again - right now its dead to me.   Patient Self Reported Health Status: fair  Rehabilitation Potential: good    Short Term Goals: To be accomplished in 1 weeks:  Goal: Pt to be compliant with initial HEP to improve cervical, elbow, wrist mobility in accordance with MD protocol. Status at last note/certification: Established and reviewed with Pt  Long Term Goals: To be accomplished in 5 weeks:  Goal: Pt to increase right shoulder PROM to WNL all planes without increased pain to prepare for AAROM activities once cleared by MD.  Status at last note/certification: supine PROM - Flex 119 deg, Scaption 115 deg, ER at 45 deg - 9 deg, IR at 45 deg - 63 deg  Goal: Pt to report < 4/10 pain at worst to increase ease with ADLs. Status at last note/certification: 3/29 pain at worst  Goal: Pt to report FOTO score of 62 pts to show improved function and quality of life.   Status at last note/certification: FOTO 4 pts     Frequency / Duration: Patient to be seen 2 times per week for 5 weeks. Patient/ Caregiver education and instruction: Diagnosis, prognosis, exercises   [x]  Plan of care has been reviewed with ALEXANDER Jaquez, PT 12/21/2022 11:37 AM  _____________________________________________________________________  I certify that the above Therapy Services are being furnished while the patient is under my care. I agree with the treatment plan and certify that this therapy is necessary.     [de-identified] Signature:____________Date:_________TIME:________     Alexx Wilson MD  ** Signature, Date and Time must be completed for valid certification **    Please sign and return to [facility] - [address]  [phone] fax

## 2022-12-23 ENCOUNTER — HOSPITAL ENCOUNTER (OUTPATIENT)
Dept: PHYSICAL THERAPY | Age: 67
Discharge: HOME | End: 2022-12-23
Payer: COMMERCIAL

## 2022-12-23 PROCEDURE — 97110 THERAPEUTIC EXERCISES: CPT

## 2022-12-23 PROCEDURE — 97140 MANUAL THERAPY 1/> REGIONS: CPT

## 2022-12-23 NOTE — PROGRESS NOTES
PT DAILY TREATMENT NOTE     Patient Name: Rupal Frank  Date:2022  : 1955  [x]  Patient  Verified  Payor: BLUE CROSS / Plan: [facility] PPO / Product Type: PPO /    In time:2:58  Out time:3:52  Total Treatment Time (min): 47  Visit #: 2 of 10    Medicare/BCBS Only   Total Timed Codes (min):  44 1:1 Treatment Time:  44       Treatment Area: Right shoulder pain [M25.511]    SUBJECTIVE  Pain Level (0-10 scale): 0  Any medication changes, allergies to medications, adverse drug reactions, diagnosis change, or new procedure performed?: [x] No    [] Yes (see summary sheet for update)  Subjective functional status/changes:   [] No changes reported  I doing well today I Don't have any pain in my right shoulder at the moment     OBJECTIVE    Modality rationale: decrease inflammation and decrease pain to improve the patients ability to sleep through the night.     Min Type Additional Details    [] Estim:  []Unatt       []IFC  []Premod                        []Other:  []w/ice   []w/heat  Position:  Location:    [] Estim: []Att    []TENS instruct  []NMES                    []Other:  []w/US   []w/ice   []w/heat  Position:  Location:    []  Traction: [] Cervical       []Lumbar                       [] Prone          []Supine                       []Intermittent   []Continuous Lbs:  [] before manual  [] after manual    []  Ultrasound: []Continuous   [] Pulsed                           []1MHz   []3MHz W/cm2:  Location:    []  Iontophoresis with dexamethasone         Location: [] Take home patch   [] In clinic   10 [x]  Ice     []  heat  []  Ice massage  []  Laser   []  Anodyne Position:   Location:    []  Laser with stim  []  Other:  Position:  Location:    []  Vasopneumatic Device    []  Right     []  Left  Pre-treatment girth:  Post-treatment girth:  Measured at (location):  Pressure:       [] lo [] med [] hi   Temperature: [] lo [] med [] hi   [x] Skin assessment post-treatment:  [x]intact []redness- no adverse reaction    []redness - adverse reaction:     24 min Therapeutic Exercise:  [] See flow sheet :   Rationale: increase ROM and increase strength to improve the patients ability to maintain proper posture in sitting. 20 min Manual Therapy:  left sidelying: PROM: right scapula all directions. STM to the infraspinatus, teres major/ minor, posterior deltoid. Supine: PROM abduction, flexion, ER/IR. STM to the pec major/minor. The manual therapy interventions were performed at a separate and distinct time from the therapeutic activities interventions. Rationale: decrease pain, increase ROM, increase tissue extensibility, and decrease trigger points to improve functional UE ROM. With   [x] TE   [] TA   [] neuro   [] other: Patient Education: [x] Review HEP    [] Progressed/Changed HEP based on:   [] positioning   [] body mechanics   [] transfers   [] heat/ice application    [] other:      Other Objective/Functional Measures: exercises per chart. Pain Level (0-10 scale) post treatment: o    ASSESSMENT/Changes in Function: Pt reports to skilled therapy with decreased functional strength and ROM in right UE following a Rotator cuff repair/ SAD/ biceps tenodesis. Pt was educated on his precautions prior to start of treatment with patient verbalizing understanding. Pt's incisions are healing well  with no discharge or sighs of infection observed. Pt was instructed to use his non afflicted arm during PROM elbow flexion to promote protocol adherence. Tenderness was palpable in the infraspinatus during manual therapy. Pt described a, \"popping\" sensation in the right shoulder during but denies any increases in pain.      Patient will continue to benefit from skilled PT services to modify and progress therapeutic interventions, address functional mobility deficits, address ROM deficits, address strength deficits, analyze and address soft tissue restrictions, analyze and cue movement patterns, analyze and modify body mechanics/ergonomics, and assess and modify postural abnormalities to attain remaining goals. [x]  See Plan of Care  []  See progress note/recertification  []  See Discharge Summary         Progress towards goals / Updated goals:  Short Term Goals: To be accomplished in 1 weeks:  Goal: Pt to be compliant with initial HEP to improve cervical, elbow, wrist mobility in accordance with MD protocol. Status at last note/certification: Established and reviewed with Pt  Current: Initiated: performing multiple times a day. (12/23/2022)  Long Term Goals: To be accomplished in 5 weeks:  Goal: Pt to increase right shoulder PROM to WNL all planes without increased pain to prepare for AAROM activities once cleared by MD.  Status at last note/certification: supine PROM - Flex 119 deg, Scaption 115 deg, ER at 45 deg - 9 deg, IR at 45 deg - 63 deg  Goal: Pt to report < 4/10 pain at worst to increase ease with ADLs. Status at last note/certification: 4/50 pain at worst  Goal: Pt to report FOTO score of 62 pts to show improved function and quality of life.   Status at last note/certification: FOTO 4 pts     PLAN  [x]  Upgrade activities as tolerated     [x]  Continue plan of care  []  Update interventions per flow sheet       []  Discharge due to:_  []  Other:_      Mirta Maldonado, ALEXANDER 12/23/2022  3:00 PM    Future Appointments   Date Time Provider Tawana Grajeda   12/27/2022  9:00 AM Wilner Mccall [facility]   12/29/2022  1:30 PM Dorothea Jaquez, [facility]   1/4/2023  2:00 PM Dorothea Jaquez, [facility]   1/6/2023 10:00 AM [facility]   1/9/2023 10:30 AM [facility]   1/11/2023 10:30 AM [facility]   1/16/2023  9:30 AM Dorothea Jaquez, PT [facility]   1/18/2023  9:30 AM Dorothea Jaquez, PT [facility]   1/23/2023  9:30 AM [facility]   1/25/2023  9:30 AM Dorothea Jaquez, PT [facility]

## 2022-12-27 ENCOUNTER — HOSPITAL ENCOUNTER (OUTPATIENT)
Dept: PHYSICAL THERAPY | Age: 67
Discharge: HOME | End: 2022-12-27
Payer: COMMERCIAL

## 2022-12-27 PROCEDURE — 97110 THERAPEUTIC EXERCISES: CPT

## 2022-12-27 PROCEDURE — 97140 MANUAL THERAPY 1/> REGIONS: CPT

## 2022-12-27 NOTE — PROGRESS NOTES
PT DAILY TREATMENT NOTE     Patient Name: Leah Sale  Date:2022  : 1955  [x]  Patient  Verified  Payor: BLUE CROSS / Plan: [facility] PPO / Product Type: PPO /    In time:900  Out time:950  Total Treatment Time (min): 50  Visit #: 3 of 10    Medicare/BCBS Only   Total Timed Codes (min):  40 1:1 Treatment Time:  40       Treatment Area: Right shoulder pain [M25.511]    SUBJECTIVE  Pain Level (0-10 scale): 1/10  Any medication changes, allergies to medications, adverse drug reactions, diagnosis change, or new procedure performed?: [x] No    [] Yes (see summary sheet for update)  Subjective functional status/changes:   [] No changes reported  Pt reports minimal discomfort this morning. Reports no difficulty with sleeping at night.      OBJECTIVE    Modality rationale: decrease inflammation and decrease pain to improve the patients ability to perform ADL's   Min Type Additional Details    [] Estim:  []Unatt       []IFC  []Premod                        []Other:  []w/ice   []w/heat  Position:  Location:    [] Estim: []Att    []TENS instruct  []NMES                    []Other:  []w/US   []w/ice   []w/heat  Position:  Location:    []  Traction: [] Cervical       []Lumbar                       [] Prone          []Supine                       []Intermittent   []Continuous Lbs:  [] before manual  [] after manual    []  Ultrasound: []Continuous   [] Pulsed                           []1MHz   []3MHz W/cm2:  Location:    []  Iontophoresis with dexamethasone         Location: [] Take home patch   [] In clinic   10 [x]  Ice     []  heat  []  Ice massage  []  Laser   []  Anodyne Position: seated  Location: right shoulder    []  Laser with stim  []  Other:  Position:  Location:    []  Vasopneumatic Device    []  Right     []  Left  Pre-treatment girth:  Post-treatment girth:  Measured at (location):  Pressure:       [] lo [] med [] hi   Temperature: [] lo [] med [] hi   [x] Skin assessment post-treatment:  [x]intact []redness- no adverse reaction    []redness - adverse reaction:       15 min Therapeutic Exercise:  [x] See flow sheet :   Rationale: increase ROM and increase strength to improve the patients ability to perform ADL's    25 min Manual Therapy: In left sidelying: scapular mobs/clocks. In supine: STM/DTM UT, LS, medial scapular border, gentle PROM in all planes, elbow flex/ext/pronation PROM   The manual therapy interventions were performed at a separate and distinct time from the therapeutic activities interventions. Rationale: decrease pain, increase ROM, and increase tissue extensibility to perform ADL's            With   [] TE   [] TA   [] neuro   [] other: Patient Education: [x] Review HEP    [] Progressed/Changed HEP based on:   [] positioning   [] body mechanics   [] transfers   [] heat/ice application    [] other:      Other Objective/Functional Measures: Progressed therex per flowsheet   TRP to right UT, rhomboids    Pain Level (0-10 scale) post treatment: 0/10    ASSESSMENT/Changes in Function: Pt making steady progress towards goals. Pt tolerated treatment session well without increased pain or discomfort. Decreased TRP post treatment session. No pain post treatment session. Will continue to progress as tolerated per MD protocol. Patient will continue to benefit from skilled PT services to modify and progress therapeutic interventions, address functional mobility deficits, address ROM deficits, and address strength deficits to attain remaining goals. []  See Plan of Care  []  See progress note/recertification  []  See Discharge Summary         Progress towards goals / Updated goals:  Short Term Goals: To be accomplished in 1 weeks:  Goal: Pt to be compliant with initial HEP to improve cervical, elbow, wrist mobility in accordance with MD protocol. Status at last note/certification: Established and reviewed with Pt  Current: Initiated: performing multiple times a day. (12/23/2022)  Long Term Goals: To be accomplished in 5 weeks:  Goal: Pt to increase right shoulder PROM to WNL all planes without increased pain to prepare for AAROM activities once cleared by MD.  Status at last note/certification: supine PROM - Flex 119 deg, Scaption 115 deg, ER at 45 deg - 9 deg, IR at 45 deg - 63 deg  Goal: Pt to report < 4/10 pain at worst to increase ease with ADLs. Status at last note/certification: 6/49 pain at worst  Current: 1/10 at start of treatment session 12/27/22  Goal: Pt to report FOTO score of 62 pts to show improved function and quality of life.   Status at last note/certification: FOTO 4 pts     PLAN  []  Upgrade activities as tolerated     [x]  Continue plan of care  []  Update interventions per flow sheet       []  Discharge due to:_  []  Other:_      Moi Hendrix, PTA 12/27/2022  9:03 AM    Future Appointments   Date Time Provider Tawana Grajeda   12/30/2022 11:15 AM Ngozi Salinas [facility]   1/4/2023  2:00 PM Tori Jaquez, [facility]   1/6/2023 10:00 AM [facility]   1/9/2023 10:30 AM [facility]   1/11/2023 10:30 AM [facility]   1/16/2023  9:30 AM Tori Jaquez, [facility]   1/18/2023  9:30 AM Tori Jaquez, [facility]   1/23/2023  9:30 AM Namita Johnson [facility] [place]   1/25/2023  9:30 AM Tori Jaquez, PT [facility]

## 2022-12-29 ENCOUNTER — APPOINTMENT (OUTPATIENT)
Dept: PHYSICAL THERAPY | Age: 67
End: 2022-12-29
Payer: COMMERCIAL

## 2022-12-30 ENCOUNTER — HOSPITAL ENCOUNTER (OUTPATIENT)
Dept: PHYSICAL THERAPY | Age: 67
End: 2022-12-30
Payer: COMMERCIAL

## 2022-12-30 PROCEDURE — 97140 MANUAL THERAPY 1/> REGIONS: CPT

## 2022-12-30 PROCEDURE — 97110 THERAPEUTIC EXERCISES: CPT

## 2022-12-30 NOTE — PROGRESS NOTES
PT DAILY TREATMENT NOTE     Patient Name: Katherine Drake  Date:2022  : 1955  [x]  Patient  Verified  Payor: BLUE CROSS / Plan: [facility] PPO / Product Type: PPO /    In time: 11:15   Out time: 12:05  Total Treatment Time (min): 50  Visit #: 4 of 10    Medicare/BCBS Only   Total Timed Codes (min):  40 1:1 Treatment Time:  40       Treatment Area: Right shoulder pain [M25.511]    SUBJECTIVE  Pain Level (0-10 scale): 1  Any medication changes, allergies to medications, adverse drug reactions, diagnosis change, or new procedure performed?: [x] No    [] Yes (see summary sheet for update)  Subjective functional status/changes:   [] No changes reported  Pt reports he saw the MD yesterday and everything is looking good.      OBJECTIVE    Modality rationale: decrease inflammation and decrease pain to improve the patients ability to perform ADLs   Min Type Additional Details    [] Estim:  []Unatt       []IFC  []Premod                        []Other:  []w/ice   []w/heat  Position:  Location:    [] Estim: []Att    []TENS instruct  []NMES                    []Other:  []w/US   []w/ice   []w/heat  Position:  Location:    []  Traction: [] Cervical       []Lumbar                       [] Prone          []Supine                       []Intermittent   []Continuous Lbs:  [] before manual  [] after manual    []  Ultrasound: []Continuous   [] Pulsed                           []1MHz   []3MHz W/cm2:  Location:    []  Iontophoresis with dexamethasone         Location: [] Take home patch   [] In clinic   10 [x]  Ice     []  heat  []  Ice massage  []  Laser   []  Anodyne Position: seated  Location: right shoulder    []  Laser with stim  []  Other:  Position:  Location:    []  Vasopneumatic Device    []  Right     []  Left  Pre-treatment girth:  Post-treatment girth:  Measured at (location):  Pressure:       [] lo [] med [] hi   Temperature: [] lo [] med [] hi   [x] Skin assessment post-treatment: [x]intact []redness- no adverse reaction    []redness - adverse reaction:     23 min Therapeutic Exercise:  [x] See flow sheet :   Rationale: increase ROM and increase strength to improve the patients ability to perform ADL's    17 min Manual Therapy: In left s/l: right scap mobs in all planes, DTM/TPR right UT/supraspinatus,infraspinatus/teres; in supine: gentle right GHJ distraction, DTM right pec, PROM with gentle overpressure/oscillations into right shoulder flex/ABD/ER/IR after performing above. The manual therapy interventions were performed at a separate and distinct time from the therapeutic activities interventions. Rationale: decrease pain, increase ROM, and increase tissue extensibility to perform ADL's         With   [] TE   [] TA   [] neuro   [] other: Patient Education: [x] Review HEP    [] Progressed/Changed HEP based on:   [] positioning   [] body mechanics   [] transfers   [] heat/ice application    [] other:      Other Objective/Functional Measures: continued interventions per flow sheet. Right shoulder flex PROM ~ 120-130 degs per visual observation. Pain Level (0-10 scale) post treatment: 0    ASSESSMENT/Changes in Function: Several trigger points noted and improved with manual interventions. Bruising noted to the right upper arm. Educated pt to not push through pain with exercises. Continue POC as tolerated to improve ROM and mobility per protocol. Patient will continue to benefit from skilled PT services to modify and progress therapeutic interventions, address functional mobility deficits, address ROM deficits, and address strength deficits to attain remaining goals. []  See Plan of Care  []  See progress note/recertification  []  See Discharge Summary         Progress towards goals / Updated goals:  Short Term Goals: To be accomplished in 1 weeks:  Goal: Pt to be compliant with initial HEP to improve cervical, elbow, wrist mobility in accordance with MD protocol.   Status at last note/certification: Established and reviewed with Pt  Current: Initiated: performing multiple times a day. (12/23/2022)  Long Term Goals: To be accomplished in 5 weeks:  Goal: Pt to increase right shoulder PROM to WNL all planes without increased pain to prepare for AAROM activities once cleared by MD.  Status at last note/certification: supine PROM - Flex 119 deg, Scaption 115 deg, ER at 45 deg - 9 deg, IR at 45 deg - 63 deg  Current: Right shoulder flex PROM ~ 120-130 degs per visual observation 12/30/2022  Goal: Pt to report < 4/10 pain at worst to increase ease with ADLs. Status at last note/certification: 2/59 pain at worst  Current: 1/10 at start of treatment session 12/27/22  Goal: Pt to report FOTO score of 62 pts to show improved function and quality of life.   Status at last note/certification: FOTO 4 pts     PLAN  [x]  Upgrade activities as tolerated     [x]  Continue plan of care  [x]  Update interventions per flow sheet       []  Discharge due to:_  []  Other:_      Kendell Mckee, PT 12/30/2022  11:33 AM    Future Appointments   Date Time Provider Tawana Grajeda   1/4/2023  2:00 PM Ilir Jaquez, PT [facility]   1/6/2023 10:00 AM [facility] [place] [facility]   1/9/2023 10:30 AM [facility]   1/11/2023 10:30 AM [facility] [facility]   1/16/2023  9:30 AM [facility] [place] [facility]   1/18/2023  9:30 AM Ilir Jaquez, PT [facility]   1/23/2023  9:30 AM Rashid Bustos [facility]   1/25/2023  9:30 AM Ilir Jaquez, PT [facility]

## 2023-01-04 ENCOUNTER — HOSPITAL ENCOUNTER (OUTPATIENT)
Dept: PHYSICAL THERAPY | Age: 68
Discharge: HOME | End: 2023-01-04
Payer: COMMERCIAL

## 2023-01-04 PROCEDURE — 97110 THERAPEUTIC EXERCISES: CPT

## 2023-01-04 PROCEDURE — 97140 MANUAL THERAPY 1/> REGIONS: CPT

## 2023-01-04 NOTE — PROGRESS NOTES
PT DAILY TREATMENT NOTE     Patient Name: Guillaume Iqbal  Date:2023  : 1955  [x]  Patient  Verified  Payor: BLUE CROSS / Plan: [facility] PPO / Product Type: PPO /    In time: 2:05   Out time: 2:46  Total Treatment Time (min): 41  Visit #: 5 of 10    Medicare/BCBS Only   Total Timed Codes (min):  31 1:1 Treatment Time:  31       Treatment Area: Right shoulder pain [M25.511]    SUBJECTIVE  Pain Level (0-10 scale): 2/10  Any medication changes, allergies to medications, adverse drug reactions, diagnosis change, or new procedure performed?: [x] No    [] Yes (see summary sheet for update)  Subjective functional status/changes:   [] No changes reported  \"I'm tight today. If I move a certain way, I feel it. \"    OBJECTIVE    Modality rationale: decrease inflammation and decrease pain to improve the patients ability to perform ADLs   Min Type Additional Details    [] Estim:  []Unatt       []IFC  []Premod                        []Other:  []w/ice   []w/heat  Position:  Location:    [] Estim: []Att    []TENS instruct  []NMES                    []Other:  []w/US   []w/ice   []w/heat  Position:  Location:    []  Traction: [] Cervical       []Lumbar                       [] Prone          []Supine                       []Intermittent   []Continuous Lbs:  [] before manual  [] after manual    []  Ultrasound: []Continuous   [] Pulsed                           []1MHz   []3MHz W/cm2:  Location:    []  Iontophoresis with dexamethasone         Location: [] Take home patch   [] In clinic   10 [x]  Ice     []  heat  []  Ice massage  []  Laser   []  Anodyne Position: seated  Location: right shoulder    []  Laser with stim  []  Other:  Position:  Location:    []  Vasopneumatic Device    []  Right     []  Left  Pre-treatment girth:  Post-treatment girth:  Measured at (location):  Pressure:       [] lo [] med [] hi   Temperature: [] lo [] med [] hi   [x] Skin assessment post-treatment:  [x]intact []redness- no adverse reaction    []redness - adverse reaction:     16 min Therapeutic Exercise:  [x] See flow sheet :   Rationale: increase ROM and increase strength to improve the patients ability to perform ADL's    15 min Manual Therapy: Supine STM to right UT, bicep mm, pectoralis release; manual stretching all planes with distraction; S/L STJ mobs, distraction   The manual therapy interventions were performed at a separate and distinct time from the therapeutic activities interventions. Rationale: decrease pain, increase ROM, and increase tissue extensibility to perform ADL's         With   [] TE   [] TA   [] neuro   [] other: Patient Education: [x] Review HEP    [] Progressed/Changed HEP based on:   [] positioning   [] body mechanics   [] transfers   [] heat/ice application    [] other:      Other Objective/Functional Measures: Continued with exercises per flow sheet. Pain Level (0-10 scale) post treatment: 1/10    ASSESSMENT/Changes in Function:  Pt's session focused on manual interventions to improve functional shoulder mobility passively and for gentle wrist, scapular exercise and assistive elbow AROM interventions. Initiated pendulums passively with verbal and demonstrative cueing for proper technique and to avoid stress/pain to right shoulder. Applied cold pack to address post exercise soreness. Pt able to report reduction in pain post session. Patient will continue to benefit from skilled PT services to modify and progress therapeutic interventions, address functional mobility deficits, address ROM deficits, and address strength deficits to attain remaining goals. []  See Plan of Care  []  See progress note/recertification  []  See Discharge Summary         Progress towards goals / Updated goals:  Short Term Goals: To be accomplished in 1 weeks:  Goal: Pt to be compliant with initial HEP to improve cervical, elbow, wrist mobility in accordance with MD protocol.   Status at last note/certification: Established and reviewed with Pt  Current: met - Pt performing multiple times a day. (1/4/23)  Long Term Goals: To be accomplished in 5 weeks:  Goal: Pt to increase right shoulder PROM to WNL all planes without increased pain to prepare for AAROM activities once cleared by MD.  Status at last note/certification: supine PROM - Flex 119 deg, Scaption 115 deg, ER at 45 deg - 9 deg, IR at 45 deg - 63 deg  Current: progressing - Right shoulder flex PROM ~ 120-130 degs per visual observation (12/30/2022)  Goal: Pt to report < 4/10 pain at worst to increase ease with ADLs. Status at last note/certification: 8/73 pain at worst  Current: progressing - 4-5/10 pain at worst (1/4/23)  Goal: Pt to report FOTO score of 62 pts to show improved function and quality of life.   Status at last note/certification: FOTO 4 pts   Current: reassess at MD note (1/4/23)    PLAN  []  Upgrade activities as tolerated     [x]  Continue plan of care  []  Update interventions per flow sheet       []  Discharge due to:_  [x]  Other:_reassess goals next visit      Shiloh Jaquez, PT 1/4/2023  11:33 AM    Future Appointments   Date Time Provider Tawana Grajeda   1/6/2023 10:00 AM [facility]   1/9/2023 10:30 AM Morgan, [address]   1/11/2023 10:30 AM [facility]   1/16/2023  9:30 AM [facility]   1/18/2023  9:30 AM [facility]   [facility]/23[facility]   1/25/2023  9:30 AM Shiloh Jaquez, PT [facility]

## 2023-01-06 ENCOUNTER — HOSPITAL ENCOUNTER (OUTPATIENT)
Dept: PHYSICAL THERAPY | Age: 68
Discharge: HOME | End: 2023-01-06
Payer: COMMERCIAL

## 2023-01-06 PROCEDURE — 97110 THERAPEUTIC EXERCISES: CPT

## 2023-01-06 PROCEDURE — 97140 MANUAL THERAPY 1/> REGIONS: CPT

## 2023-01-09 ENCOUNTER — HOSPITAL ENCOUNTER (OUTPATIENT)
Dept: PHYSICAL THERAPY | Age: 68
Discharge: HOME | End: 2023-01-09
Payer: COMMERCIAL

## 2023-01-09 PROCEDURE — 97110 THERAPEUTIC EXERCISES: CPT

## 2023-01-09 PROCEDURE — 97140 MANUAL THERAPY 1/> REGIONS: CPT

## 2023-01-09 NOTE — PROGRESS NOTES
PT DAILY TREATMENT NOTE     Patient Name: Darling Reese  Date:2023  : 1955  [x]  Patient  Verified  Payor: BLUE CROSS / Plan: [facility] PPO / Product Type: PPO /    In time:10:30  Out time:11:25  Total Treatment Time (min): 55  Visit #: 7 of 10    Medicare/BCBS Only   Total Timed Codes (min):  45 1:1 Treatment Time:  45       Treatment Area: Right shoulder pain [M25.511]    SUBJECTIVE  Pain Level (0-10 scale):  2  Any medication changes, allergies to medications, adverse drug reactions, diagnosis change, or new procedure performed?: [x] No    [] Yes (see summary sheet for update)  Subjective functional status/changes:   [] No changes reported  Just feeling stiff    OBJECTIVE    Modality rationale: decrease inflammation and decrease pain to improve the patients ability to reduce post session pain   Min Type Additional Details    [] Estim:  []Unatt       []IFC  []Premod                        []Other:  []w/ice   []w/heat  Position:  Location:    [] Estim: []Att    []TENS instruct  []NMES                    []Other:  []w/US   []w/ice   []w/heat  Position:  Location:    []  Traction: [] Cervical       []Lumbar                       [] Prone          []Supine                       []Intermittent   []Continuous Lbs:  [] before manual  [] after manual    []  Ultrasound: []Continuous   [] Pulsed                           []1MHz   []3MHz W/cm2:  Location:    []  Iontophoresis with dexamethasone         Location: [] Take home patch   [] In clinic   10 [x]  Ice     []  heat  []  Ice massage  []  Laser   []  Anodyne Position: sitting  Location: right shoulder    []  Laser with stim  []  Other:  Position:  Location:    []  Vasopneumatic Device    []  Right     []  Left  Pre-treatment girth:  Post-treatment girth:  Measured at (location):  Pressure:       [] lo [] med [] hi   Temperature: [] lo [] med [] hi   [x] Skin assessment post-treatment:  [x]intact []redness- no adverse reaction []redness - adverse reaction:         15 min Therapeutic Exercise:  [] See flow sheet :   Rationale: increase ROM and increase strength to improve the patients ability to increase hand/wrist strength, ADL's      30 min Manual Therapy:  STM and trigger point release to anterior deltoid, STM to pec major, biceps. SL scap mobs with STM to periscapular muscles, Lev scap and UT   The manual therapy interventions were performed at a separate and distinct time from the therapeutic activities interventions. Rationale: decrease pain, increase ROM, and increase tissue extensibility to improve shoulder and scapular mobility          With   [] TE   [] TA   [] neuro   [] other: Patient Education: [x] Review HEP    [] Progressed/Changed HEP based on:   [] positioning   [] body mechanics   [] transfers   [] heat/ice application    [] other:      Other Objective/Functional Measures:      Pain Level (0-10 scale) post treatment: 0    ASSESSMENT/Changes in Function: pt seen t address right shoulder pain s/p RTC repair, POC following MD protocol for PROM. Progressing toward LTG # 1 with increased PRM in flexion,scaption and ER. Will continue to progress as per protocol and manage symptoms with application of ice and modalities as needed. Patient will continue to benefit from skilled PT services to modify and progress therapeutic interventions, address functional mobility deficits, address ROM deficits, address strength deficits, analyze and address soft tissue restrictions, analyze and cue movement patterns, analyze and modify body mechanics/ergonomics, assess and modify postural abnormalities, address imbalance/dizziness, and instruct in home and community integration to attain remaining goals.      []  See Plan of Care  []  See progress note/recertification  []  See Discharge Summary         Progress towards goals / Updated goals:  Goal: Pt to be compliant with initial HEP to improve cervical, elbow, wrist mobility in accordance with MD protocol. Status at last note/certification: Established and reviewed with Pt  Current: met - Pt performing multiple times a day. (1/4/23)  Long Term Goals: To be accomplished in 5 weeks:  Goal: Pt to increase right shoulder PROM to WNL all planes without increased pain to prepare for AAROM activities once cleared by MD.  Status at last note/certification: supine PROM - Flex 119 deg, Scaption 115 deg, ER at 45 deg - 9 deg, IR at 45 deg - 63 deg  Current: progressing - Right shoulder flex PROM ~ 135 degrees, scaption 125 degrees, ER @ 45  is 20 degrees,  IR @ 45 is (1/9/2023)  Goal: Pt to report < 4/10 pain at worst to increase ease with ADLs. Status at last note/certification: 4/18 pain at worst  Current: progressing - 4-5/10 pain at worst (1/4/23)  Goal: Pt to report FOTO score of 62 pts to show improved function and quality of life.   Status at last note/certification: FOTO 4 pts   Current: reassess at MD note (1/4/23)    PLAN  [x]  Upgrade activities as tolerated     []  Continue plan of care  []  Update interventions per flow sheet       []  Discharge due to:_  []  Other:_      Mandy Allen, ALEXANDER 1/9/2023  10:24 AM    Future Appointments   Date Time Provider Tawana Grajeda   1/9/2023 10:30 AM Svetlana Reina [facility]   1/11/2023 10:30 AM Eddie, [address] [place] [address]   1/16/2023  8:00 AM Nicole Huang, PT [facility]   1/18/2023  9:30 AM Svetlana Shadow [facility]   1/23/2023  9:30 AM Eddie, [address] [place] [address]   1/25/2023  9:30 AM Thuy Jaquez, PT [facility]

## 2023-01-11 ENCOUNTER — HOSPITAL ENCOUNTER (OUTPATIENT)
Dept: PHYSICAL THERAPY | Age: 68
Discharge: HOME | End: 2023-01-11
Payer: COMMERCIAL

## 2023-01-11 PROCEDURE — 97140 MANUAL THERAPY 1/> REGIONS: CPT

## 2023-01-11 PROCEDURE — 97110 THERAPEUTIC EXERCISES: CPT

## 2023-01-11 NOTE — PROGRESS NOTES
PT DAILY TREATMENT NOTE     Patient Name: Earnest Pryor  Date:2023  : 1955  [x]  Patient  Verified  Payor: BLUE CROSS / Plan: [facility] PPO / Product Type: PPO /    In time:10:35  Out time:11:15  Total Treatment Time (min): 40  Visit #: 8 of 10    Medicare/BCBS Only   Total Timed Codes (min):  30 1:1 Treatment Time:  30       Treatment Area: Right shoulder pain [M25.511]    SUBJECTIVE  Pain Level (0-10 scale): 1  Any medication changes, allergies to medications, adverse drug reactions, diagnosis change, or new procedure performed?: [x] No    [] Yes (see summary sheet for update)  Subjective functional status/changes:   [] No changes reported      OBJECTIVE    Modality rationale: decrease inflammation and decrease pain to improve the patients ability to reduce post session pain   Min Type Additional Details    [] Estim:  []Unatt       []IFC  []Premod                        []Other:  []w/ice   []w/heat  Position:  Location:    [] Estim: []Att    []TENS instruct  []NMES                    []Other:  []w/US   []w/ice   []w/heat  Position:  Location:    []  Traction: [] Cervical       []Lumbar                       [] Prone          []Supine                       []Intermittent   []Continuous Lbs:  [] before manual  [] after manual    []  Ultrasound: []Continuous   [] Pulsed                           []1MHz   []3MHz W/cm2:  Location:    []  Iontophoresis with dexamethasone         Location: [] Take home patch   [] In clinic   10 [x]  Ice     []  heat  []  Ice massage  []  Laser   []  Anodyne Position: sitting  Location: right shoulder    []  Laser with stim  []  Other:  Position:  Location:    []  Vasopneumatic Device    []  Right     []  Left  Pre-treatment girth:  Post-treatment girth:  Measured at (location):  Pressure:       [] lo [] med [] hi   Temperature: [] lo [] med [] hi   [x] Skin assessment post-treatment:  [x]intact []redness- no adverse reaction    []redness - adverse reaction:         15 min Therapeutic Exercise:  [] See flow sheet :   Rationale: increase ROM and increase strength to improve the patients ability to increase wrist strength, ease of ADL's    15 min Manual Therapy:  supine STM to right biceps, deltoid, pec major. manual stretching with GHJ mobs inferior, posterior  SL scap  mobs   The manual therapy interventions were performed at a separate and distinct time from the therapeutic activities interventions. Rationale: decrease pain, increase ROM, and increase tissue extensibility to  improve scapular mobility and prepare for next phase of protocol    With   [] TE   [] TA   [] neuro   [] other: Patient Education: [x] Review HEP    [] Progressed/Changed HEP based on:   [] positioning   [] body mechanics   [] transfers   [] heat/ice application    [] other:      Other Objective/Functional Measures: ex's per card     Pain Level (0-10 scale) post treatment: 1    ASSESSMENT/Changes in Function: pt seen today s/p right RTC repair. Program addressed cervical and shoulder ROM,and scapular strengthening. Demonstrates good scapular mobility, taut and tender in supraspinatus muscle belly. Patient will continue to benefit from skilled PT services to modify and progress therapeutic interventions, address functional mobility deficits, address ROM deficits, address strength deficits, analyze and address soft tissue restrictions, analyze and cue movement patterns, analyze and modify body mechanics/ergonomics, assess and modify postural abnormalities, address imbalance/dizziness, and instruct in home and community integration to attain remaining goals. []  See Plan of Care  []  See progress note/recertification  []  See Discharge Summary         Progress towards goals / Updated goals:  Goal: Pt to be compliant with initial HEP to improve cervical, elbow, wrist mobility in accordance with MD protocol.   Status at last note/certification: Established and reviewed with Pt  Current: met - Pt performing multiple times a day. (1/4/23)  Long Term Goals: To be accomplished in 5 weeks:  Goal: Pt to increase right shoulder PROM to WNL all planes without increased pain to prepare for AAROM activities once cleared by MD.  Status at last note/certification: supine PROM - Flex 119 deg, Scaption 115 deg, ER at 45 deg - 9 deg, IR at 45 deg - 63 deg  Current: progressing - Right shoulder flex PROM ~ 135 degrees, scaption 125 degrees, ER @ 45  is 20 degrees,  IR @ 45 is (1/9/2023)  Goal: Pt to report < 4/10 pain at worst to increase ease with ADLs. Status at last note/certification: 2/68 pain at worst  Current: progressing - 4-5/10 pain at worst (1/4/23)  Goal: Pt to report FOTO score of 62 pts to show improved function and quality of life.   Status at last note/certification: FOTO 4 pts   Current: reassess at MD note (1/4/23)    PLAN  [x]  Upgrade activities as tolerated     []  Continue plan of care  []  Update interventions per flow sheet       []  Discharge due to:_  []  Other:_      Alex Bajwa, PTA 1/11/2023  12:13 PM    Future Appointments   Date Time Provider Tawana Grajeda   1/16/2023  8:00 AM Jennifer Ballesteros, PT [facility]   1/18/2023  9:30 AM Brenden Snell [facility]   1/23/2023  9:30 AM Eddie [address]   1/25/2023  9:30 AM Michael Jaquez, PT [facility]

## 2023-01-16 ENCOUNTER — HOSPITAL ENCOUNTER (OUTPATIENT)
Dept: PHYSICAL THERAPY | Age: 68
Discharge: HOME | End: 2023-01-16
Payer: COMMERCIAL

## 2023-01-16 PROCEDURE — 97110 THERAPEUTIC EXERCISES: CPT

## 2023-01-16 PROCEDURE — 97140 MANUAL THERAPY 1/> REGIONS: CPT

## 2023-01-16 NOTE — PROGRESS NOTES
PT DAILY TREATMENT NOTE     Patient Name: Sakshi Bennett  Date:2023  : 1955  [x]  Patient  Verified  Payor: Precious Pallas / Plan: [facility] PPO / Product Type: PPO /    In time: 7:57   Out time: 8:40  Total Treatment Time (min): 43  Visit #: 9 of 10    Medicare/BCBS Only   Total Timed Codes (min):  33 1:1 Treatment Time:  33       Treatment Area: Right shoulder pain [M25.511]    SUBJECTIVE  Pain Level (0-10 scale): 2  Any medication changes, allergies to medications, adverse drug reactions, diagnosis change, or new procedure performed?: [x] No    [] Yes (see summary sheet for update)  Subjective functional status/changes:   [] No changes reported  Pt reports his shoulder is extra achy this morning. OBJECTIVE    Modality rationale: decrease inflammation and decrease pain to improve the patients ability to perform functional tasks.     Min Type Additional Details    [] Estim:  []Unatt       []IFC  []Premod                        []Other:  []w/ice   []w/heat  Position:  Location:    [] Estim: []Att    []TENS instruct  []NMES                    []Other:  []w/US   []w/ice   []w/heat  Position:  Location:    []  Traction: [] Cervical       []Lumbar                       [] Prone          []Supine                       []Intermittent   []Continuous Lbs:  [] before manual  [] after manual    []  Ultrasound: []Continuous   [] Pulsed                           []1MHz   []3MHz W/cm2:  Location:    []  Iontophoresis with dexamethasone         Location: [] Take home patch   [] In clinic   10 [x]  Ice     []  heat  []  Ice massage  []  Laser   []  Anodyne Position: sitting  Location: right shoulder    []  Laser with stim  []  Other:  Position:  Location:    []  Vasopneumatic Device    []  Right     []  Left  Pre-treatment girth:  Post-treatment girth:  Measured at (location):  Pressure:       [] lo [] med [] hi   Temperature: [] lo [] med [] hi   [x] Skin assessment post-treatment:  [x]intact []redness- no adverse reaction    []redness - adverse reaction:     13 min Therapeutic Exercise:  [x] See flow sheet :   Rationale: increase ROM and increase strength to improve the patients ability to increase ADL tolerance. 20 min Manual Therapy: in left s/l: right scap mobs in all planes, DTM/TPR right UT/supraspinatus/infraspinatus; in supine: DTM/TPR right biceps/pec minor/major, PROM right shoulder flex/ABD/IR/ER with gentle overpressure/distraction and oscillations after performing above   The manual therapy interventions were performed at a separate and distinct time from the therapeutic activities interventions. Rationale: decrease pain, increase ROM, and increase tissue extensibility to  improve activity tolerance. With   [x] TE   [] TA   [] neuro   [] other: Patient Education: [x] Review HEP    [] Progressed/Changed HEP based on:   [] positioning   [] body mechanics   [] transfers   [] heat/ice application    [] other:      Other Objective/Functional Measures: Continued interventions per flow sheet. Pain Level (0-10 scale) post treatment: 2    ASSESSMENT/Changes in Function: Reported improvement in tightness and tenderness to trigger points on the right scapular region with manual interventions but continues to have soft tissue restrictions to the right shoulder and scapula. Plan to reassess pt NV for PN to continue therapy per protocol. Patient will continue to benefit from skilled PT services to modify and progress therapeutic interventions, address functional mobility deficits, address ROM deficits, address strength deficits, analyze and address soft tissue restrictions, analyze and cue movement patterns, analyze and modify body mechanics/ergonomics, assess and modify postural abnormalities, address imbalance/dizziness, and instruct in home and community integration to attain remaining goals.      []  See Plan of Care  []  See progress note/recertification  []  See Discharge Summary Progress towards goals / Updated goals:  Goal: Pt to be compliant with initial HEP to improve cervical, elbow, wrist mobility in accordance with MD protocol. Status at last note/certification: Established and reviewed with Pt  Current: met - Pt performing multiple times a day. (1/4/23)  Long Term Goals: To be accomplished in 5 weeks:  Goal: Pt to increase right shoulder PROM to WNL all planes without increased pain to prepare for AAROM activities once cleared by MD.  Status at last note/certification: supine PROM - Flex 119 deg, Scaption 115 deg, ER at 45 deg - 9 deg, IR at 45 deg - 63 deg  Current: progressing - Right shoulder flex PROM ~ 135 degrees, scaption 125 degrees, ER @ 45  is 20 degrees (1/9/2023)  Goal: Pt to report < 4/10 pain at worst to increase ease with ADLs. Status at last note/certification: 2/79 pain at worst  Current: progressing - 5-6/10 pain at worst 1/16/23  Goal: Pt to report FOTO score of 62 pts to show improved function and quality of life.   Status at last note/certification: FOTO 4 pts   Current: reassess NV 1/16/2023    PLAN  [x]  Upgrade activities as tolerated     [x]  Continue plan of care  [x]  Update interventions per flow sheet       []  Discharge due to:_  []  Other:_      Corry Johnson, PT 1/16/2023  8:14 AM    Future Appointments   Date Time Provider Tawana Grajeda   1/16/2023  8:00 AM Daryl Palafox, PT [facility]   1/18/2023  9:30 AM Lavera Gilford [facility]   1/23/2023  9:30 AM Eddie, [address]   1/25/2023  9:30 AM Marcy Jaquez, PT [facility]

## 2023-01-18 ENCOUNTER — HOSPITAL ENCOUNTER (OUTPATIENT)
Dept: PHYSICAL THERAPY | Age: 68
Discharge: HOME | End: 2023-01-18
Payer: COMMERCIAL

## 2023-01-18 PROCEDURE — 97530 THERAPEUTIC ACTIVITIES: CPT

## 2023-01-18 PROCEDURE — 97140 MANUAL THERAPY 1/> REGIONS: CPT

## 2023-01-18 PROCEDURE — 97110 THERAPEUTIC EXERCISES: CPT

## 2023-01-18 NOTE — PROGRESS NOTES
[facility] - [facility], [address] [place] [address]  [phone] fax    Progress Note  Patient name: Sofia Oakes Start of Care: 2022   Referral source: Anthony Reinoso MD : 1955   Medical/Treatment Diagnosis: Right shoulder pain [M25.511]  Payor: [facility] / Plan: [facility] PPO / Product Type: PPO /  Onset Date:22 (surgery)                 Prior Hospitalization: see medical history Provider#: [id number]   Medications: Verified on Patient Summary List    Comorbidities:  Arthritis; HTN; hx left RCR   Prior Level of Function: Works in [employer] for [employer]; lives in [facility] home with spouse; enjoys woodworking, yard work  Visits from [place] of Care: 10    Missed Visits: 0    Established Goals:            Goal: Pt to be compliant with initial HEP to improve cervical, elbow, wrist mobility in accordance with MD protocol. Status at last note/certification: Established and reviewed with Pt  Current: met - Pt performing multiple times a day. (23)  Long Term Goals: To be accomplished in 5 weeks:  Goal: Pt to increase right shoulder PROM to WNL all planes without increased pain to prepare for AAROM activities once cleared by MD.  Status at last note/certification: supine PROM - Flex 119 deg, Scaption 115 deg, ER at 45 deg - 9 deg, IR at 45 deg - 63 deg  Current: progressing - Right shoulder flex PROM ~ 135 degrees, scaption 125 degrees, ER @ 45  is 20 degrees,  IR @ 45 is (2023)  Goal: Pt to report < 4/10 pain at worst to increase ease with ADLs. Status at last note/certification: 0/60 pain at worst  Current: progressing - 4-5/10 pain at worst (23)  Goal: Pt to report FOTO score of 62 pts to show improved function and quality of life.   Status at last note/certification: FOTO 4 pts   Current: progressing: FOTO 27     Key Functional Changes: Patient has attended 10 sessions of skilled PT, including the evaluation for Right shoulder pain [M25.511] s/p RTC repair. He is progressing as expected within limits of surgical protocol. At  this time, he is compliant with protocol including sling use and PROM only. Objective PROM is as follows: flexion 135 degrees, scaption 125 degrees and is steadily progressing. We will plan on continuing therapy per protocol. Updated Goals: to be achieved in 10 treatments:  Goal: Pt to increase right shoulder PROM to WNL all planes without increased pain to increase ease of dressing. PN: Right shoulder PROM flex ~ 135 degs, scaption 125 degs, ER @ 45 degs ABD is 20 degs  Goal: Pt to report < 4/10 pain at worst to increase ease with ADLs. PN: 4-5/10 pain at worst  Goal: Pt to report FOTO score of 62 pts to show improved function and quality of life. PN: FOTO 27  Goal: pt will increased AAROM right shoulder flex/ABD to 90 degs when cleared to help the pt progress through the protocol. PN: PROM restriction currently. ASSESSMENT/RECOMMENDATIONS:  [x]Continue therapy per initial plan/protocol at a frequency of  2 x per week for 10 treatments.   []Continue therapy with the following recommended changes:_____________________      _____________________________________________________________________  []Discontinue therapy progressing towards or have reached established goals  []Discontinue therapy due to lack of appreciable progress towards goals  []Discontinue therapy due to lack of attendance or compliance  []Await Physician's recommendations/decisions regarding therapy  []Other:________________________________________________________________    Thank you for this referral.   Corry Johnson, PT 1/18/2023 12:51 PM  NOTE TO PHYSICIAN:  [address]   FAX TO [facility]: [phone]  If you are unable to process this request in 24 hours please contact our office: [phone]  []  I have read the above report and request that my patient continue as recommended. []  I have read the above report and request that my patient continue therapy with the following changes/special instructions:________________________________________  []I have read the above report and request that my patient be discharged from therapy.     [de-identified] Signature:____________Date:_________TIME:________     Teodora Engel MD  ** Signature, Date and Time must be completed for valid certification **

## 2023-01-18 NOTE — PROGRESS NOTES
PT DAILY TREATMENT NOTE     Patient Name: Jeanne Weiner  Date:2023  : 1955  [x]  Patient  Verified  Payor: BLUE CROSS / Plan: [facility] PPO / Product Type: PPO /    In time:10:30 Out time:11:10  Total Treatment Time (min): 40  Visit #: 10 of 10    Medicare/BCBS Only   Total Timed Codes (min):  40 1:1 Treatment Time:  40       Treatment Area: Right shoulder pain [M25.511]    SUBJECTIVE  Pain Level (0-10 scale): 1-2  Any medication changes, allergies to medications, adverse drug reactions, diagnosis change, or new procedure performed?: [x] No    [] Yes (see summary sheet for update)  Subjective functional status/changes:   [] No changes reported  I've been having more pain lately. Sharp pains, I guess things are waking up. OBJECTIVE      17 min Therapeutic Exercise:  [] See flow sheet :   Rationale: increase ROM and increase strength to improve the patients ability to improve ease of sleep, ADL's     8 min Therapeutic Activity:  []  See flow sheet :   Rationale: increase ROM and increase strength  to improve the patients ability to perform daily tasks, wrist strength       15 min Manual Therapy:  Supine: STM to coracobrachiales, deltoid, manual stretching. SL spa mobs with STM to periscapular muscles   The manual therapy interventions were performed at a separate and distinct time from the therapeutic activities interventions.   Rationale: increase ROM and increase tissue extensibility to reduce c/o pain, improve ADL's sleep            With   [] TE   [] TA   [] neuro   [] other: Patient Education: [x] Review HEP    [] Progressed/Changed HEP based on:   [] positioning   [] body mechanics   [] transfers   [] heat/ice application    [] other:      Other Objective/Functional Measures: ex's per card, reassess goals     Pain Level (0-10 scale) post treatment: 1    ASSESSMENT/Changes in Function: see goals    Patient will continue to benefit from skilled PT services to modify and progress therapeutic interventions, address functional mobility deficits, address ROM deficits, address strength deficits, analyze and address soft tissue restrictions, analyze and cue movement patterns, analyze and modify body mechanics/ergonomics, assess and modify postural abnormalities, address imbalance/dizziness, and instruct in home and community integration to attain remaining goals. []  See Plan of Care  []  See progress note/recertification  []  See Discharge Summary         Progress towards goals / Updated goals:  Goal: Pt to be compliant with initial HEP to improve cervical, elbow, wrist mobility in accordance with MD protocol. Status at last note/certification: Established and reviewed with Pt  Current: met - Pt performing multiple times a day. (1/4/23)  Long Term Goals: To be accomplished in 5 weeks:  Goal: Pt to increase right shoulder PROM to WNL all planes without increased pain to prepare for AAROM activities once cleared by MD.  Status at last note/certification: supine PROM - Flex 119 deg, Scaption 115 deg, ER at 45 deg - 9 deg, IR at 45 deg - 63 deg  Current: progressing - Right shoulder flex PROM ~ 135 degrees, scaption 125 degrees, ER @ 45  is 20 degrees,  IR @ 45 is (1/9/2023)  Goal: Pt to report < 4/10 pain at worst to increase ease with ADLs. Status at last note/certification: 9/19 pain at worst  Current: progressing - 4-5/10 pain at worst (1/4/23)  Goal: Pt to report FOTO score of 62 pts to show improved function and quality of life.   Status at last note/certification: FOTO 4 pts   Current: progressing: FOTO 27    PLAN  [x]  Upgrade activities as tolerated     []  Continue plan of care  []  Update interventions per flow sheet       []  Discharge due to:_  []  Other:_      Jeanine Brooks, ALEXANDER 1/18/2023  10:34 AM    Future Appointments   Date Time Provider Tawana Grajeda   1/23/2023  9:30 AM Radha Urbano   1/25/2023  9:30 AM Zohreh Jaquez, PT [facility]

## 2023-01-23 ENCOUNTER — APPOINTMENT (OUTPATIENT)
Dept: PHYSICAL THERAPY | Age: 68
End: 2023-01-23
Payer: COMMERCIAL

## 2023-01-25 ENCOUNTER — HOSPITAL ENCOUNTER (OUTPATIENT)
Dept: PHYSICAL THERAPY | Age: 68
Discharge: HOME | End: 2023-01-25
Payer: COMMERCIAL

## 2023-01-25 PROCEDURE — 97140 MANUAL THERAPY 1/> REGIONS: CPT

## 2023-01-25 PROCEDURE — 97110 THERAPEUTIC EXERCISES: CPT

## 2023-01-25 NOTE — PROGRESS NOTES
PT DAILY TREATMENT NOTE     Patient Name: Fahad Cheek  Date:2023  : 1955  [x]  Patient  Verified  Payor: BLUE CROSS / Plan: [facility] PPO / Product Type: PPO /    In time:9:30  Out time:10:12  Total Treatment Time (min): 42  Visit #: 1 of 10    Medicare/BCBS Only   Total Timed Codes (min):  32 1:1 Treatment Time:  32       Treatment Area: Right shoulder pain [M25.511]    SUBJECTIVE  Pain Level (0-10 scale): 3/10  Any medication changes, allergies to medications, adverse drug reactions, diagnosis change, or new procedure performed?: [x] No    [] Yes (see summary sheet for update)  Subjective functional status/changes:   [] No changes reported  \"I have a little more soreness today. I saw the MD and I am cleared to do more now but I was told to still use the sling as needed for the next couple of weeks. \"    OBJECTIVE    Modality rationale: decrease inflammation and decrease pain to improve the patients ability to reduce post therapy soreness   Min Type Additional Details    [] Estim:  []Unatt       []IFC  []Premod                        []Other:  []w/ice   []w/heat  Position:  Location:    [] Estim: []Att    []TENS instruct  []NMES                    []Other:  []w/US   []w/ice   []w/heat  Position:  Location:    []  Traction: [] Cervical       []Lumbar                       [] Prone          []Supine                       []Intermittent   []Continuous Lbs:  [] before manual  [] after manual    []  Ultrasound: []Continuous   [] Pulsed                           []1MHz   []3MHz W/cm2:  Location:    []  Iontophoresis with dexamethasone         Location: [] Take home patch   [] In clinic   10 [x]  Ice     []  heat  []  Ice massage  []  Laser   []  Anodyne Position: seated with pillow under arm  Location: right shoulder    []  Laser with stim  []  Other:  Position:  Location:    []  Vasopneumatic Device    []  Right     []  Left  Pre-treatment girth:  Post-treatment girth:  Measured at (location):  Pressure:       [] lo [] med [] hi   Temperature: [] lo [] med [] hi   [x] Skin assessment post-treatment:  [x]intact []redness- no adverse reaction    []redness - adverse reaction:     17 min Therapeutic Exercise:  [] See flow sheet :   Rationale: increase ROM and increase strength to improve the patients ability to increase right shoulder ROM for ease of ADLs, overhead reaching     15 min Manual Therapy:  Supine STM to right UT, pectoralis major mm; Inf GHJ glides, Gr III; manual stretching all planes with distraction   The manual therapy interventions were performed at a separate and distinct time from the therapeutic activities interventions. Rationale: increase ROM, increase tissue extensibility, and decrease trigger points to increase right shoulder mobility for ease of ADLs          With   [] TE   [] TA   [] neuro   [] other: Patient Education: [x] Review HEP    [] Progressed/Changed HEP based on:   [] positioning   [] body mechanics   [] transfers   [] heat/ice application    [] other:      Other Objective/Functional Measures: Progressed treatment program with AAROM activities. Pain Level (0-10 scale) post treatment: 2/10    ASSESSMENT/Changes in Function: Pt's session focused on progression of program to include AAROM activities to work towards increased independence with ADL function. Pt appropriately challenged with exercise interventions given but did note some increased soreness post exercises. Provided cold pack to address soreness and pain. Pt's HEP was updated to coincide with progression of treatment program for AAROM activities and return demonstrated 100% understanding of HEP. Will continue per Pt tolerance per updated MD protocol to improve right shoulder mobility, strength, without use of weights.     Patient will continue to benefit from skilled PT services to address functional mobility deficits, address ROM deficits, address strength deficits, analyze and address soft tissue restrictions, analyze and cue movement patterns, analyze and modify body mechanics/ergonomics, assess and modify postural abnormalities, and instruct in home and community integration to attain remaining goals. []  See Plan of Care  []  See progress note/recertification  []  See Discharge Summary         Progress towards goals / Updated goals:  Goal: Pt to increase right shoulder PROM to WNL all planes without increased pain to increase ease of dressing. PN: Right shoulder PROM flex ~ 135 degs, scaption 125 degs, ER @ 45 degs ABD is 20 degs  Current:  Goal: Pt to report < 4/10 pain at worst to increase ease with ADLs. PN: 4-5/10 pain at worst  Current:  Goal: Pt to report FOTO score of 62 pts to show improved function and quality of life. PN: FOTO 27  Current:  Goal: pt will increased AAROM right shoulder flex/ABD to 90 degs when cleared to help the pt progress through the protocol. PN: PROM restriction currently.   Current:    PLAN  [x]  Upgrade activities as tolerated     [x]  Continue plan of care  []  Update interventions per flow sheet       []  Discharge due to:_  []  Other:_      Ankush Jaquez PT 1/25/2023  11:13 AM    Future Appointments   Date Time Provider Tawana Grajeda   2/3/2023  3:30 PM Elshanta Ramsey   2/6/2023  8:00 AM Lady Mejia, PT [facility] [address]   2/8/2023  8:00 AM Ankush Jaquez, PT [facility]

## 2023-02-01 ENCOUNTER — HOSPITAL ENCOUNTER (OUTPATIENT)
Dept: PHYSICAL THERAPY | Age: 68
Discharge: HOME | End: 2023-02-01
Payer: COMMERCIAL

## 2023-02-01 PROCEDURE — 97110 THERAPEUTIC EXERCISES: CPT

## 2023-02-01 PROCEDURE — 97140 MANUAL THERAPY 1/> REGIONS: CPT

## 2023-02-01 NOTE — PROGRESS NOTES
PT DAILY TREATMENT NOTE     Patient Name: Alexia Stroud  Date:2023  : 1955  [x]  Patient  Verified  Payor: BLUE CROSS / Plan: [facility] PPO / Product Type: PPO /    In time:8:00  Out time:8:46  Total Treatment Time (min): 55  Visit #: 2 of 10    Medicare/BCBS Only   Total Timed Codes (min):  36 1:1 Treatment Time:  36       Treatment Area: Right shoulder pain [M25.511]    SUBJECTIVE  Pain Level (0-10 scale): 2/10  Any medication changes, allergies to medications, adverse drug reactions, diagnosis change, or new procedure performed?: [x] No    [] Yes (see summary sheet for update)  Subjective functional status/changes:   [] No changes reported  \"I am tight and sore. I've been sitting in on meetings and hearings. \"    OBJECTIVE    Modality rationale: decrease inflammation and decrease pain to improve the patients ability to reduce post therapy soreness   Min Type Additional Details    [] Estim:  []Unatt       []IFC  []Premod                        []Other:  []w/ice   []w/heat  Position:  Location:    [] Estim: []Att    []TENS instruct  []NMES                    []Other:  []w/US   []w/ice   []w/heat  Position:  Location:    []  Traction: [] Cervical       []Lumbar                       [] Prone          []Supine                       []Intermittent   []Continuous Lbs:  [] before manual  [] after manual    []  Ultrasound: []Continuous   [] Pulsed                           []1MHz   []3MHz W/cm2:  Location:    []  Iontophoresis with dexamethasone         Location: [] Take home patch   [] In clinic   10 [x]  Ice     []  heat  []  Ice massage  []  Laser   []  Anodyne Position: seated with pillow under arm  Location: right shoulder    []  Laser with stim  []  Other:  Position:  Location:    []  Vasopneumatic Device    []  Right     []  Left  Pre-treatment girth:  Post-treatment girth:  Measured at (location):  Pressure:       [] lo [] med [] hi   Temperature: [] lo [] med [] hi   [x] Skin assessment post-treatment:  [x]intact []redness- no adverse reaction    []redness - adverse reaction:     26 min Therapeutic Exercise:  [] See flow sheet :   Rationale: increase ROM and increase strength to improve the patients ability to increase right shoulder ROM for ease of ADLs, overhead reaching     10 min Manual Therapy:  Supine STM to right UT mm; Inf GHJ glides, Gr III; manual stretching all planes with distraction   The manual therapy interventions were performed at a separate and distinct time from the therapeutic activities interventions. Rationale: increase ROM, increase tissue extensibility, and decrease trigger points to increase right shoulder mobility for ease of ADLs          With   [] TE   [] TA   [] neuro   [] other: Patient Education: [x] Review HEP    [] Progressed/Changed HEP based on:   [] positioning   [] body mechanics   [] transfers   [] heat/ice application    [] other:      Other Objective/Functional Measures: Continued progression of treatment program with AAROM/AROM activities. Pain Level (0-10 scale) post treatment: 2-3/10    ASSESSMENT/Changes in Function: Pt's session focused on progression of program to include AAROM/AROM activities to work towards increased independence with ADL function. Pt appropriately challenged with exercise interventions given but did note some increased soreness post exercises. Provided cold pack to address soreness and pain. Will continue to progress per Pt tolerance and updated MD protocol to improve right shoulder mobility, strength, without use of weights.     Patient will continue to benefit from skilled PT services to address functional mobility deficits, address ROM deficits, address strength deficits, analyze and address soft tissue restrictions, analyze and cue movement patterns, analyze and modify body mechanics/ergonomics, assess and modify postural abnormalities, and instruct in home and community integration to attain remaining goals.     []  See Plan of Care  []  See progress note/recertification  []  See Discharge Summary         Progress towards goals / Updated goals:  Goal: Pt to increase right shoulder PROM to WNL all planes without increased pain to increase ease of dressing. PN: Right shoulder PROM flex ~ 135 degs, scaption 125 degs, ER @ 45 degs ABD is 20 degs  Current:  Goal: Pt to report < 4/10 pain at worst to increase ease with ADLs. PN: 4-5/10 pain at worst  Current: progressing - still 4-5/10 pain at worst (2/1/23)  Goal: Pt to report FOTO score of 62 pts to show improved function and quality of life. PN: FOTO 27  Current: reassess at MD note (2/1/23)  Goal: pt will increased AAROM right shoulder flex/ABD to 90 degs when cleared to help the pt progress through the protocol. PN: PROM restriction currently.   Current:    PLAN  [x]  Upgrade activities as tolerated     [x]  Continue plan of care  []  Update interventions per flow sheet       []  Discharge due to:_  []  Other:_      Deandra Jaquez, PT 2/1/2023  11:13 AM    Future Appointments   Date Time Provider Tawana Grajeda   2/3/2023  3:30 PM Rosibel Burch [facility]   2/6/2023  8:00 AM Michael Deng, PT [facility] [address] [facility]   2/8/2023  8:00 AM Deandra Jaquez, PT [facility] [facility]

## 2023-02-03 ENCOUNTER — HOSPITAL ENCOUNTER (OUTPATIENT)
Dept: PHYSICAL THERAPY | Age: 68
Discharge: HOME | End: 2023-02-03
Payer: COMMERCIAL

## 2023-02-03 PROCEDURE — 97110 THERAPEUTIC EXERCISES: CPT

## 2023-02-03 PROCEDURE — 97140 MANUAL THERAPY 1/> REGIONS: CPT

## 2023-02-03 NOTE — PROGRESS NOTES
PT DAILY TREATMENT NOTE     Patient Name: Jose Uribe  Date:2/3/2023  : 1955  [x]  Patient  Verified  Payor: BLUE CROSS / Plan: [id number] PPO / Product Type: PPO /    In time:3:30  Out time:4:20  Total Treatment Time (min): 50  Visit #: 3 of 10    Medicare/BCBS Only   Total Timed Codes (min):  40 1:1 Treatment Time:  30       Treatment Area: Right shoulder pain [M25.511]    SUBJECTIVE  Pain Level (0-10 scale):  2  Any medication changes, allergies to medications, adverse drug reactions, diagnosis change, or new procedure performed?: [x] No    [] Yes (see summary sheet for update)  Subjective functional status/changes:   [] No changes reported      OBJECTIVE    Modality rationale: decrease inflammation and decrease pain to improve the patients ability to reduce post session pain   Min Type Additional Details    [] Estim:  []Unatt       []IFC  []Premod                        []Other:  []w/ice   []w/heat  Position:  Location:    [] Estim: []Att    []TENS instruct  []NMES                    []Other:  []w/US   []w/ice   []w/heat  Position:  Location:    []  Traction: [] Cervical       []Lumbar                       [] Prone          []Supine                       []Intermittent   []Continuous Lbs:  [] before manual  [] after manual    []  Ultrasound: []Continuous   [] Pulsed                           []1MHz   []3MHz W/cm2:  Location:    []  Iontophoresis with dexamethasone         Location: [] Take home patch   [] In clinic   10 [x]  Ice     []  heat  []  Ice massage  []  Laser   []  Anodyne Position: sitting  Location: right shoulder    []  Laser with stim  []  Other:  Position:  Location:    []  Vasopneumatic Device    []  Right     []  Left  Pre-treatment girth:  Post-treatment girth:  Measured at (location):  Pressure:       [] lo [] med [] hi   Temperature: [] lo [] med [] hi   [x] Skin assessment post-treatment:  [x]intact []redness- no adverse reaction    []redness - adverse reaction:         25 min Therapeutic Exercise:  [] See flow sheet :   Rationale: increase ROM and increase strength to improve the patients ability to perform ADL's, dressing    15 min Manual Therapy:  supine  manual stretching and STM to deltoid, UT, biceps. SL scap mobs with STM to periscapular muscles, UT trigger point release    The manual therapy interventions were performed at a separate and distinct time from the therapeutic activities interventions. Rationale: decrease pain, increase ROM, and increase tissue extensibility to perform daily tasks,     With   [] TE   [] TA   [] neuro   [] other: Patient Education: [x] Review HEP    [] Progressed/Changed HEP based on:   [] positioning   [] body mechanics   [] transfers   [] heat/ice application    [] other:      Other Objective/Functional Measures: ex's per card     Pain Level (0-10 scale) post treatment: 1    ASSESSMENT/Changes in Function: Pain reported that continues in right shoulder with AAROM within range of approximately 90 - 100 degrees range worse on return from flexed position. Tenderness palpable in supraspinatus and muscle belly of biceps (middle 1/3)  CP applied at end of session reduce pain    Patient will continue to benefit from skilled PT services to modify and progress therapeutic interventions, address functional mobility deficits, address ROM deficits, address strength deficits, analyze and address soft tissue restrictions, analyze and cue movement patterns, analyze and modify body mechanics/ergonomics, assess and modify postural abnormalities, address imbalance/dizziness, and instruct in home and community integration to attain remaining goals. []  See Plan of Care  []  See progress note/recertification  []  See Discharge Summary         Progress towards goals / Updated goals:  Goal: Pt to increase right shoulder PROM to WNL all planes without increased pain to increase ease of dressing.    PN: Right shoulder PROM flex ~ 135 degs, scaption 125 degs, ER @ 45 degs ABD is 20 degs  Current:  Goal: Pt to report < 4/10 pain at worst to increase ease with ADLs. PN: 4-5/10 pain at worst  Current: progressing - still 4-5/10 pain at worst (2/1/23)  Goal: Pt to report FOTO score of 62 pts to show improved function and quality of life. PN: FOTO 27  Current: reassess at MD note (2/1/23)  Goal: pt will increased AAROM right shoulder flex/ABD to 90 degs when cleared to help the pt progress through the protocol. PN: PROM restriction currently.   Current:    PLAN  []  Upgrade activities as tolerated     []  Continue plan of care  []  Update interventions per flow sheet       []  Discharge due to:_  []  Other:_      Fortunato Serrano, PTA 2/3/2023  3:56 PM    Future Appointments   Date Time Provider Tawana Grajeda   2/6/2023  8:00 AM Saida Chaves, PT [facility] [address] [facility]   2/8/2023  8:00 AM Sariah Jaquez, PT [facility] [address] [facility]

## 2023-02-06 ENCOUNTER — HOSPITAL ENCOUNTER (OUTPATIENT)
Dept: PHYSICAL THERAPY | Age: 68
Discharge: HOME | End: 2023-02-06
Payer: COMMERCIAL

## 2023-02-06 PROCEDURE — 97110 THERAPEUTIC EXERCISES: CPT

## 2023-02-06 PROCEDURE — 97140 MANUAL THERAPY 1/> REGIONS: CPT

## 2023-02-06 NOTE — PROGRESS NOTES
PT DAILY TREATMENT NOTE     Patient Name: Krista Martinez  Date:2023  : 1955  [x]  Patient  Verified  Payor: BLUE CROSS / Plan: [facility] PPO / Product Type: PPO /    In time:803 am  Out time:854 am  Total Treatment Time (min): 51  Visit #: 4 of 10    Medicare/BCBS Only   Total Timed Codes (min):  39 1:1 Treatment Time:  39       Treatment Area: Right shoulder pain [M25.511]    SUBJECTIVE  Pain Level (0-10 scale): 3  Any medication changes, allergies to medications, adverse drug reactions, diagnosis change, or new procedure performed?: [x] No    [] Yes (see summary sheet for update)  Subjective functional status/changes:   [] No changes reported  \"stiff. \"    OBJECTIVE    Modality rationale: decrease inflammation and decrease pain to improve the patients ability to perform ADL's   Min Type Additional Details    [] Estim:  []Unatt       []IFC  []Premod                        []Other:  []w/ice   []w/heat  Position:   Location:     [] Estim: []Att    []TENS instruct  []NMES                    []Other:  []w/US   []w/ice   []w/heat  Position:  Location:    []  Traction: [] Cervical       []Lumbar                       [] Prone          []Supine                       []Intermittent   []Continuous Lbs:  [] before manual  [] after manual    []  Ultrasound: []Continuous   [] Pulsed                           []1MHz   []3MHz W/cm2:  Location:    []  Iontophoresis with dexamethasone         Location: [] Take home patch   [] In clinic   10' + 2' set up [x]  Ice     []  heat  []  Ice massage  []  Laser   []  Anodyne Position: reclined  Location: right shoulder     []  Laser with stim  []  Other:  Position:  Location:    []  Vasopneumatic Device    []  Right     []  Left  Pre-treatment girth:  Post-treatment girth:  Measured at (location):  Pressure:       [] lo [] med [] hi   Temperature: [] lo [] med [] hi   [x] Skin assessment post-treatment:  [x]intact []redness- no adverse reaction []redness - adverse reaction:       29 min Therapeutic Exercise:  [x] See flow sheet :   Rationale: increase ROM and increase strength to improve the patients ability to perform ADLs with improved shoulder/elbow strength and mobility. 10 min Manual Therapy:  Right GHJ mobs inferior and posterior- PROM flexion and scaption, STM to right pec and right upper trap. The manual therapy interventions were performed at a separate and distinct time from the therapeutic activities interventions. Rationale: decrease pain, increase ROM, increase tissue extensibility, decrease trigger points and increase postural awareness to improve ease of ADLs in the home environment. With   [] TE   [] TA   [] neuro   [] other: Patient Education: [x] Review HEP    [] Progressed/Changed HEP based on:   [] positioning   [] body mechanics   [] transfers   [] heat/ice application    [x] other: instructed patient not to perform bicep curls from last HEP provided     Other Objective/Functional Measures: Added: wand flexion, finger ladder     Pain Level (0-10 scale) post treatment: 1    ASSESSMENT/Changes in Function: pt progressing with AAROM and AROM exercises- provided HEP for wand flexion and wall slides flexion. He reports catching with right UE lowering when performing wand flexion-extension back to lap- instructed patient to bend elbow when lowering to decrease tension in right biceps. Noted capsular restrictions with manual therapy. Pt reports decreased pain levels post PT session.      Patient will continue to benefit from skilled PT services to modify and progress therapeutic interventions, address functional mobility deficits, address ROM deficits, address strength deficits, analyze and address soft tissue restrictions, analyze and cue movement patterns, analyze and modify body mechanics/ergonomics, assess and modify postural abnormalities, address imbalance/dizziness and instruct in home and community integration to attain remaining goals and improve overall function. .     []  See Plan of Care  []  See progress note/recertification  []  See Discharge Summary         Progress towards goals / Updated goals:  Goal: Pt to increase right shoulder PROM to WNL all planes without increased pain to increase ease of dressing. PN: Right shoulder PROM flex ~ 135 degs, scaption 125 degs, ER @ 45 degs ABD is 20 degs  Current:  Goal: Pt to report < 4/10 pain at worst to increase ease with ADLs. PN: 4-5/10 pain at worst  Current: progressing - still 4-5/10 pain at worst (2/1/23)  Goal: Pt to report FOTO score of 62 pts to show improved function and quality of life. PN: FOTO 27  Current: reassess at MD note (2/1/23)  Goal: pt will increased AAROM right shoulder flex/ABD to 90 degs when cleared to help the pt progress through the protocol. PN: PROM restriction currently.   Current:     PLAN  [x]  Upgrade activities as tolerated     [x]  Continue plan of care  []  Update interventions per flow sheet       []  Discharge due to:_  []  Other:_      Harish Contreras, PT 2/6/2023 8:55 AM     Future Appointments   Date Time Provider Tawana Grajeda   2/8/2023  8:00 AM David Jaquez, PT [facility]

## 2023-02-08 ENCOUNTER — HOSPITAL ENCOUNTER (OUTPATIENT)
Dept: PHYSICAL THERAPY | Age: 68
Discharge: HOME | End: 2023-02-08
Payer: COMMERCIAL

## 2023-02-08 PROCEDURE — 97140 MANUAL THERAPY 1/> REGIONS: CPT

## 2023-02-08 PROCEDURE — 97110 THERAPEUTIC EXERCISES: CPT

## 2023-02-08 NOTE — PROGRESS NOTES
PT DAILY TREATMENT NOTE     Patient Name: Jase Grossman  Date:2023  : 1955  [x]  Patient  Verified  Payor: BLUE CROSS / Plan: [facility] PPO / Product Type: PPO /    In time:8:01  Out time:8:49  Total Treatment Time (min): 48  Visit #: 5 of 10    Medicare/BCBS Only   Total Timed Codes (min):  38 1:1 Treatment Time:  35       Treatment Area: Right shoulder pain [M25.511]    SUBJECTIVE  Pain Level (0-10 scale): 2/10  Any medication changes, allergies to medications, adverse drug reactions, diagnosis change, or new procedure performed?: [x] No    [] Yes (see summary sheet for update)  Subjective functional status/changes:   [] No changes reported  \"I feel okay, just twinges. \"    OBJECTIVE    Modality rationale: decrease inflammation and decrease pain to improve the patients ability to perform ADL's   Min Type Additional Details    [] Estim:  []Unatt       []IFC  []Premod                        []Other:  []w/ice   []w/heat  Position:   Location:     [] Estim: []Att    []TENS instruct  []NMES                    []Other:  []w/US   []w/ice   []w/heat  Position:  Location:    []  Traction: [] Cervical       []Lumbar                       [] Prone          []Supine                       []Intermittent   []Continuous Lbs:  [] before manual  [] after manual    []  Ultrasound: []Continuous   [] Pulsed                           []1MHz   []3MHz W/cm2:  Location:    []  Iontophoresis with dexamethasone         Location: [] Take home patch   [] In clinic   10 [x]  Ice     []  heat  []  Ice massage  []  Laser   []  Anodyne Position: reclined  Location: right shoulder     []  Laser with stim  []  Other:  Position:  Location:    []  Vasopneumatic Device    []  Right     []  Left  Pre-treatment girth:  Post-treatment girth:  Measured at (location):  Pressure:       [] lo [] med [] hi   Temperature: [] lo [] med [] hi   [x] Skin assessment post-treatment:  [x]intact []redness- no adverse reaction []redness - adverse reaction:       28 min Therapeutic Exercise:  [x] See flow sheet :   Rationale: increase ROM and increase strength to improve the patients ability to perform ADLs with improved shoulder/elbow strength and mobility. 10 min Manual Therapy: Reclined STM to right UT, pectoralis major, deltoid, bicep mm   The manual therapy interventions were performed at a separate and distinct time from the therapeutic activities interventions. Rationale: decrease pain, increase ROM, increase tissue extensibility, decrease trigger points and increase postural awareness to improve ease of ADLs in the home environment. With   [] TE   [] TA   [] neuro   [] other: Patient Education: [x] Review HEP    [] Progressed/Changed HEP based on:   [] positioning   [] body mechanics   [] transfers   [] heat/ice application    [x] other: instructed patient not to perform bicep curls from last HEP provided     Other Objective/Functional Measures: Performed exercises per flow sheet. Pain Level (0-10 scale) post treatment: 1/10    ASSESSMENT/Changes in Function:  Pt's session continued to focus on functional mobility and gentle strengthening of right UE to work towards full AROM and return to full use of right UE without pain or difficulty. Pt able to perform wand chest press this date without increased pain. Pt asked about returning to massage therapy and was advised that this would be okay, with expectation that massage therapist is informed of surgical procedure, positions for comfort, and does not perform excessive movement of right UE. Pressure should kept light to medium. Pt reported understanding.     Patient will continue to benefit from skilled PT services to modify and progress therapeutic interventions, address functional mobility deficits, address ROM deficits, address strength deficits, analyze and address soft tissue restrictions, analyze and cue movement patterns, analyze and modify body mechanics/ergonomics, assess and modify postural abnormalities, address imbalance/dizziness and instruct in home and community integration to attain remaining goals and improve overall function. .     []  See Plan of Care  []  See progress note/recertification  []  See Discharge Summary         Progress towards goals / Updated goals:  Goal: Pt to increase right shoulder PROM to WNL all planes without increased pain to increase ease of dressing. PN: Right shoulder PROM flex ~ 135 degs, scaption 125 degs, ER @ 45 degs ABD is 20 degs  Current:  Goal: Pt to report < 4/10 pain at worst to increase ease with ADLs. PN: 4-5/10 pain at worst  Current: progressing - still 4-5/10 pain at worst (2/1/23)  Goal: Pt to report FOTO score of 62 pts to show improved function and quality of life. PN: FOTO 27  Current: reassess at MD note (2/1/23)  Goal: pt will increased AAROM right shoulder flex/ABD to 90 degs when cleared to help the pt progress through the protocol. PN: PROM restriction currently. Current:     PLAN  [x]  Upgrade activities as tolerated     [x]  Continue plan of care  []  Update interventions per flow sheet       []  Discharge due to:_  []  Other:_      Scott Jaquez, PT 2/8/2023 8:55 AM     No future appointments.

## 2023-02-13 ENCOUNTER — HOSPITAL ENCOUNTER (OUTPATIENT)
Facility: HOSPITAL | Age: 68
Setting detail: RECURRING SERIES
Discharge: HOME | End: 2023-02-16
Payer: COMMERCIAL

## 2023-02-13 PROCEDURE — 97140 MANUAL THERAPY 1/> REGIONS: CPT

## 2023-02-13 PROCEDURE — 97110 THERAPEUTIC EXERCISES: CPT

## 2023-02-13 NOTE — PROGRESS NOTES
PHYSICAL / OCCUPATIONAL THERAPY - DAILY TREATMENT NOTE (updated )    Patient Name: Sangeeta Yeboah    Date: 2023    : 1955  Insurance: Payor: [employer] / Plan: Hattie Taylor / Product Type: *No Product type* /      Patient  verified Yes     Visit #   Current / Total 6 10   Time   In / Out 4:51 5:36   Pain   In / Out 1 2   Subjective Functional Status/Changes: I'm doing well. I'm not in too much pain. Changes to:  Meds, Allergies, Med Hx, Sx Hx? If yes, update Summary List no       TREATMENT AREA =  Pain in right shoulder [M25.511]    OBJECTIVE    Modalities Rationale:     decrease inflammation and decrease pain to improve patient's ability to progress to PLOF and address remaining functional goals. min [] Estim Unattended, type/location:                                      []  w/ice    []  w/heat    min [] Estim Attended, type/location:                                     []  w/US     []  w/ice    []  w/heat    []  TENS insruct      min []  Mechanical Traction: type/lbs                   []  pro   []  sup   []  int   []  cont    []  before manual    []  after manual    min []  Ultrasound, settings/location:      min []  Iontophoresis w/ dexamethasone, location:                                               []  take home patch       []  in clinic   10 min  unbilled [x]  Ice     []  Heat    location/position: Reclined. Right shoulder. min []  Paraffin,  details:     min []  Vasopneumatic Device, press/temp:     min []  Abigail Huffmna / Raliegh Gosselin: If using vaso (only need to measure limb vaso being performed on)      pre-treatment girth :       post-treatment girth :       measured at (landmark location) :      min []  Other:    Skin assessment post-treatment (if applicable):    [x]  intact    [x]  redness- no adverse reaction                 []redness - adverse reaction:         Therapeutic Procedures:   Tx Min Billable or 1:1 Min (if diff from Tx Min) Procedure, Rationale, Specifics   25 25 [id number] Therapeutic Exercise (timed):  increase ROM, strength, coordination, balance, and proprioception to improve patient's ability to progress to PLOF and address remaining functional goals. (see flow sheet as applicable)     Details if applicable:       10 10 [id number] Manual Therapy (timed):  decrease pain, increase ROM, increase tissue extensibility, and decrease trigger points to improve patient's ability to progress to PLOF and address remaining functional goals. The manual therapy interventions were performed at a separate and distinct time from the therapeutic activities interventions . (see flow sheet as applicable)     Details if applicable:  Seated: TRP to the right levator scapulae, UT, STM to the infra/ supraspinatus. Supine: STM to the pec major/ minor, Subscapular release, PROM: abduction, flexion, ER/IR. Details if applicable:            Details if applicable:            Details if applicable:     33 29 [facility] Totals Reminder: bill using total billable min of TIMED therapeutic procedures (example: do not include dry needle or estim unattended, both untimed codes, in totals to left)  8-22 min = 1 unit; 23-37 min = 2 units; 38-52 min = 3 units; 53-67 min = 4 units; 68-82 min = 5 units   Total Total     [x]  Patient Education billed concurrently with other procedures   [x] Review HEP    [] Progressed/Changed HEP, detail:    [] Other detail:       Objective Information/Functional Measures/Assessment  Functional gains: improved functional ROM and strength. Functional deficits: \"catching\" with shoulder flexion, abduction. Pt reports to skilled therapy session with decreased functional UE strength and ROM following a right bicep tenodesis on the 12/16/2023. Pt was able to increase repetitions with many plinth exercises indicating improvements in functional UE strength. Pt noted minor increases in pain and \"popping\" in his right shoulder with supine salutes but was able to complete the exercise. Pt is making progress towards his LTGS as shown by his improve UE ROM measurements. Session tolerated well. Patient will continue to benefit from skilled PT / OT services to modify and progress therapeutic interventions, analyze and address functional mobility deficits, analyze and address ROM deficits, analyze and address strength deficits, analyze and address soft tissue restrictions, analyze and cue for proper movement patterns, and analyze and modify for postural abnormalities to address functional deficits and attain remaining goals. Progress toward goals / Updated goals:  []  See Progress Note/Recertification    Goal: Pt to increase right shoulder PROM to WNL all planes without increased pain to increase ease of dressing. PN: Right shoulder PROM flex ~ 135 degs, scaption 125 degs, ER @ 45 degs ABD is 20 degs  Current: 143 degrees, scaption: 125 deg, ER @45 degrees ABD is 32 deg. (2/13/2023)  Goal: Pt to report < 4/10 pain at worst to increase ease with ADLs. PN: 4-5/10 pain at worst  Current: progressing - still 4-5/10 pain at worst (2/1/23)  Goal: Pt to report FOTO score of 62 pts to show improved function and quality of life. PN: FOTO 27  Current: reassess at MD note (2/1/23)  Goal: pt will increased AAROM right shoulder flex/ABD to 90 degs when cleared to help the pt progress through the protocol. PN: PROM restriction currently.   Current: MET: AAROM: Abduction with wand: 120 deg with minor increases in pain, Flexion with wand : 140 degrees        PLAN  YES  Continue plan of care  []  Upgrade activities as tolerated  []  Discharge due to :  []  Other:    Ignacio Christensen PTA    2/13/2023    5:02 PM    Future Appointments   Date Time Provider Linda Kan   2/15/2023  5:00 PM [address]   2/20/2023  5:00 PM Ignacio Christensen PTA [facility] [place] [facility]   2/22/2023  5:00  [facility]   2/27/2023  5:00 PM Ignacio Christensen PTA [facility] [place] [facility]   3/1/2023  5:00 PM Ariel Denson

## 2023-02-15 ENCOUNTER — HOSPITAL ENCOUNTER (OUTPATIENT)
Facility: HOSPITAL | Age: 68
Setting detail: RECURRING SERIES
Discharge: HOME | End: 2023-02-18
Payer: COMMERCIAL

## 2023-02-15 PROCEDURE — 97140 MANUAL THERAPY 1/> REGIONS: CPT

## 2023-02-15 PROCEDURE — 97110 THERAPEUTIC EXERCISES: CPT

## 2023-02-15 NOTE — PROGRESS NOTES
PHYSICAL / OCCUPATIONAL THERAPY - DAILY TREATMENT NOTE (updated )    Patient Name: Garrett Hillman    Date: 2/15/2023    : 1955  Insurance: Payor: Abner Cortes / Plan: [employer] [place] / Product Type: *No Product type* /      Patient  verified Yes     Visit #   Current / Total 7 10   Time   In / Out 4:50 5:40   Pain   In / Out 3-4 0   Subjective Functional Status/Changes: I fell yesterday. I hit my knee then when trying to regain my balance, fell and basically \"face planted\"  did land on my arm, but mostly hit my head. Changes to:  Meds, Allergies, Med Hx, Sx Hx? If yes, update Summary List no       TREATMENT AREA =  Pain in right shoulder [M25.511]    OBJECTIVE    Modalities Rationale:     decrease inflammation and decrease pain to improve patient's ability to progress to PLOF and address remaining functional goals. min [] Estim Unattended, type/location:                                      []  w/ice    []  w/heat    min [] Estim Attended, type/location:                                     []  w/US     []  w/ice    []  w/heat    []  TENS insruct      min []  Mechanical Traction: type/lbs                   []  pro   []  sup   []  int   []  cont    []  before manual    []  after manual    min []  Ultrasound, settings/location:      min []  Iontophoresis w/ dexamethasone, location:                                               []  take home patch       []  in clinic   10 min  unbilled [x]  Ice     []  Heat    location/position: Right shoulder sitting    min []  Paraffin,  details:     min []  Vasopneumatic Device, press/temp:     min []  Mendoza Rodgers / Nimo [place]:     If using vaso (only need to measure limb vaso being performed on)      pre-treatment girth :       post-treatment girth :       measured at (landmark location) :      min []  Other:    Skin assessment post-treatment (if applicable):    []  intact    []  redness- no adverse reaction                 []redness - adverse reaction:         Therapeutic Procedures: Tx Min Billable or 1:1 Min (if diff from Tx Min) Procedure, Rationale, Specifics   25 25 [id number] Therapeutic Exercise (timed):  increase ROM, strength, coordination, balance, and proprioception to improve patient's ability to progress to PLOF and address remaining functional goals. (see flow sheet as applicable)     Details if applicable:       15 15 [id number] Manual Therapy (timed):  decrease pain, increase ROM, and increase tissue extensibility to improve patient's ability to progress to PLOF and address remaining functional goals. The manual therapy interventions were performed at a separate and distinct time from the therapeutic activities interventions . (see flow sheet as applicable)     Details if applicable:  SL scap mobs with STM to periscapular muscles. Supine STM to biceps, deltoid and pec major. Manual stretching to increase flexion, scaption          Details if applicable:            Details if applicable:            Details if applicable:     36 40 [facility] Totals Reminder: bill using total billable min of TIMED therapeutic procedures (example: do not include dry needle or estim unattended, both untimed codes, in totals to left)  8-22 min = 1 unit; 23-37 min = 2 units; 38-52 min = 3 units; 53-67 min = 4 units; 68-82 min = 5 units   Total Total     [x]  Patient Education billed concurrently with other procedures   [x] Review HEP    [] Progressed/Changed HEP, detail:    [] Other detail:       Objective Information/Functional Measures/Assessment    Functional Gains: improved sleep, on left side. Deficits: due to limits of protocol for reaching lifting, no issues at shoulder heigh tor below. Pain best/worst: 0 to 10 (if he moves shoulder too quickly or wrong way)    Pt arrived today with increased pain s/p fall yesterday. Pain 3-4/10. Demonstrates no decline in AAROM, despite pain. Goals assessed for progress note. Ice applied at end of session to reduce symptoms, inflammation.       Patient will continue to benefit from skilled PT / OT services to modify and progress therapeutic interventions, analyze and address functional mobility deficits, analyze and address ROM deficits, analyze and address strength deficits, analyze and address soft tissue restrictions, analyze and cue for proper movement patterns, analyze and modify for postural abnormalities, analyze and address imbalance/dizziness, and instruct in home and community integration to address functional deficits and attain remaining goals. Progress toward goals / Updated goals:  []  See Progress Note/Recertification    Goal: Pt to increase right shoulder PROM to WNL all planes without increased pain to increase ease of dressing. PN: Right shoulder PROM flex ~ 135 degs, scaption 125 degs, ER @ 45 degs ABD is 20 degs  Current: 143 degrees, scaption: 125 deg, ER @45 degrees ABD is 32 deg. (2/13/2023)  Goal: Pt to report < 4/10 pain at worst to increase ease with ADLs. PN: 4-5/10 pain at worst  Current: progressing - still 4-5/10 pain at worst (2/1/23)  Goal: Pt to report FOTO score of 62 pts to show improved function and quality of life. PN: FOTO 27  Current:   Goal: pt will increased AAROM right shoulder flex/ABD to 90 degs when cleared to help the pt progress through the protocol. PN: PROM restriction currently.   Current: MET: AAROM: Abduction with wand: 120 deg with minor increases in pain, Flexion with wand : 140 degrees     PLAN  Yes  Continue plan of care  []  Upgrade activities as tolerated  []  Discharge due to :  []  Other:    Kip Crooked, [facility]    2/15/2023    4:48 PM    Future Appointments   Date Time Provider Linda Kan   2/15/2023  5:00  [place] [facility]   2/20/2023  5:00 PM Jocelyne Summers [facility] [place] [facility]   2/22/2023  5:00  [place] [facility]   2/27/2023  5:00 PM Jocelyne Summers [facility] [place] [facility]   3/1/2023  5:00 PM [facility] [facility]

## 2023-02-20 ENCOUNTER — HOSPITAL ENCOUNTER (OUTPATIENT)
Facility: HOSPITAL | Age: 68
Setting detail: RECURRING SERIES
Discharge: HOME | End: 2023-02-23
Payer: COMMERCIAL

## 2023-02-20 PROCEDURE — 97140 MANUAL THERAPY 1/> REGIONS: CPT

## 2023-02-20 PROCEDURE — 97110 THERAPEUTIC EXERCISES: CPT

## 2023-02-20 NOTE — PROGRESS NOTES
PHYSICAL / OCCUPATIONAL THERAPY - DAILY TREATMENT NOTE (updated )    Patient Name: Mone Estevez    Date: 2023    : 1955  Insurance: Payor: Elvia Ceballos / Plan: Denisha Cola / Product Type: *No Product type* /      Patient  verified Yes     Visit #   Current / Total 1 5   Time   In / Out 5:02 5:40   Pain   In / Out 2-3 3   Subjective Functional Status/Changes: My right shoulder is a little sore from falling last Tuesday. Changes to:  Meds, Allergies, Med Hx, Sx Hx? If yes, update Summary List no       TREATMENT AREA =  Pain in right shoulder [M25.511]    OBJECTIVE    Modalities Rationale:     decrease inflammation and decrease pain to improve patient's ability to progress to PLOF and address remaining functional goals. min [] Estim Unattended, type/location:                                      []  w/ice    []  w/heat    min [] Estim Attended, type/location:                                     []  w/US     []  w/ice    []  w/heat    []  TENS insruct      min []  Mechanical Traction: type/lbs                   []  pro   []  sup   []  int   []  cont    []  before manual    []  after manual    min []  Ultrasound, settings/location:      min []  Iontophoresis w/ dexamethasone, location:                                               []  take home patch       []  in clinic   10 min  unbill [x]  Ice     []  Heat    location/position: Seated  Right shoulder    min []  Paraffin,  details:     min []  Vasopneumatic Device, press/temp:     min []  Melissa Brooks / Sarmad Segal: If using vaso (only need to measure limb vaso being performed on)      pre-treatment girth :       post-treatment girth :       measured at (landmark location) :      min []  Other:    Skin assessment post-treatment (if applicable):    []  intact    [x]  redness- no adverse reaction                 []redness - adverse reaction:         Therapeutic Procedures:   Tx Min Billable or 1:1 Min (if diff from Bo[place]qu[place]) Procedure, Rationale, Specifics   18 18 [id number] Therapeutic Exercise (timed):  increase ROM, strength, coordination, balance, and proprioception to improve patient's ability to progress to PLOF and address remaining functional goals. (see flow sheet as applicable)     Details if applicable:       10 10 [id number] Manual Therapy (timed):  decrease pain, increase ROM, increase tissue extensibility, and decrease trigger points to improve patient's ability to progress to PLOF and address remaining functional goals. The manual therapy interventions were performed at a separate and distinct time from the therapeutic activities interventions . (see flow sheet as applicable)     Details if applicable:  supine: STM to the pec major/minor, biceps, anterior/ posterior deltoid. PROM left shoulder ER/ Abduction. 29 28 [address] Reminder: bill using total billable min of TIMED therapeutic procedures (example: do not include dry needle or estim unattended, both untimed codes, in totals to left)  8-22 min = 1 unit; 23-37 min = 2 units; 38-52 min = 3 units; 53-67 min = 4 units; 68-82 min = 5 units   Total Total     [x]  Patient Education billed concurrently with other procedures   [x] Review HEP    [] Progressed/Changed HEP, detail:    [] Other detail:       Objective Information/Functional Measures/Assessment    Pt reports to skilled therapy session with decreased functional strength and ROM in the right UE following a SAD/ biceps tenodesis on the 12/16/2022. Pt noted minor increases in right shoulder pain during table slides but noted improvement following cues to reduce ROM. Pt was able to increase repetitions during side lying shoulder abduction. Session tolerated well and concluded with cold pack.      Patient will continue to benefit from skilled PT / OT services to modify and progress therapeutic interventions, analyze and address functional mobility deficits, analyze and address ROM deficits, analyze and address strength deficits, analyze and address soft tissue restrictions, analyze and cue for proper movement patterns, and analyze and modify for postural abnormalities to address functional deficits and attain remaining goals.    Progress toward goals / Updated goals:  []  See Progress Note/Recertification    Goal: Pt to increase right shoulder PROM to WNL all planes without increased pain to increase ease of dressing.   PN:143 degrees, scaption: 125 deg, ER @45 degrees ABD is 32 deg. (2/13/2023)  Current:   Goal: Pt to report < 4/10 pain at worst to increase ease with ADLs.  PN: still 4-5/10 pain at worst   Current:   Goal: Pt to report FOTO score of 62 pts to show improved function and quality of life.  PN: FOTO 27  Current:       PLAN  Yes  Continue plan of care  []  Upgrade activities as tolerated  []  Discharge due to :  []  Other:    Maynor Cespedes PTA    2/20/2023    5:05 PM    Future Appointments   Date Time Provider Department Center   2/22/2023  5:00 PM DIEGO BRIGGS [facility]   2/27/2023  5:00 PM Maynor Cespedes PTA [facility]   3/1/2023  5:00 PM DIEGO BRIGGS [facility]

## 2023-02-22 ENCOUNTER — HOSPITAL ENCOUNTER (OUTPATIENT)
Facility: HOSPITAL | Age: 68
Setting detail: RECURRING SERIES
Discharge: HOME | End: 2023-02-25
Payer: COMMERCIAL

## 2023-02-22 PROCEDURE — 97140 MANUAL THERAPY 1/> REGIONS: CPT

## 2023-02-22 PROCEDURE — 97110 THERAPEUTIC EXERCISES: CPT

## 2023-02-22 NOTE — PROGRESS NOTES
PHYSICAL / OCCUPATIONAL THERAPY - DAILY TREATMENT NOTE (updated )    Patient Name: Tyrell Green    Date: 2023    : 1955  Insurance: Payor: Tenzin Suero / Plan: Deneen Robbins / Product Type: *No Product type* /      Patient  verified Yes     Visit #   Current / Total 2 5   Time   In / Out 5:05 5:55   Pain   In / Out 3 1-2   Subjective Functional Status/Changes: I still have crackling in my shoulder when I move it   Changes to:  Meds, Allergies, Med Hx, Sx Hx? If yes, update Summary List no       TREATMENT AREA =  Pain in right shoulder [M25.511]    OBJECTIVE    Modalities Rationale:     decrease inflammation and decrease pain to improve patient's ability to progress to PLOF and address remaining functional goals. min [] Estim Unattended, type/location:                                      []  w/ice    []  w/heat    min [] Estim Attended, type/location:                                     []  w/US     []  w/ice    []  w/heat    []  TENS insruct      min []  Mechanical Traction: type/lbs                   []  pro   []  sup   []  int   []  cont    []  before manual    []  after manual    min []  Ultrasound, settings/location:      min []  Iontophoresis w/ dexamethasone, location:                                               []  take home patch       []  in clinic   10 min  unbill [x]  Ice     []  Heat    location/position: Seated right shoulder    min []  Paraffin,  details:     min []  Vasopneumatic Device, press/temp:     min []  Nile Valentino / Kaci Hall: If using vaso (only need to measure limb vaso being performed on)      pre-treatment girth :       post-treatment girth :       measured at (landmark location) :      min []  Other:    Skin assessment post-treatment (if applicable):    [x]  intact    []  redness- no adverse reaction                 []redness - adverse reaction:         Therapeutic Procedures:   Tx Min Billable or 1:1 Min (if diff from [employer]) Procedure, Rationale, Specifics Details if applicable:       23 23 [id number] Manual Therapy (timed):  decrease pain, increase ROM, and increase tissue extensibility to improve patient's ability to progress to PLOF and address remaining functional goals. The manual therapy interventions were performed at a separate and distinct time from the therapeutic activities interventions . (see flow sheet as applicable)     Details if applicable:     17 17 35537 Therapeutic Exercise (timed):  increase ROM, strength, coordination, balance, and proprioception to improve patient's ability to progress to PLOF and address remaining functional goals. (see flow sheet as applicable)     Details if applicable:            Details if applicable:            Details if applicable:       [facility] BC Totals Reminder: bill using total billable min of TIMED therapeutic procedures (example: do not include dry needle or estim unattended, both untimed codes, in totals to left)  8-22 min = 1 unit; 23-37 min = 2 units; 38-52 min = 3 units; 53-67 min = 4 units; 68-82 min = 5 units   Total Total     [x]  Patient Education billed concurrently with other procedures   [x] Review HEP    [] Progressed/Changed HEP, detail:    [] Other detail:       Objective Information/Functional Measures/Assessment    Pt seen today to address right shoulder pain s/p RTC. Continued pain and \"popping\" with active motions. Palpable tenderness in pec major and along biceps tendon. Manual interventions and trigger point release to ease pain and improve mobility. Pain level diminshed at end of session with increased AROM.     Patient will continue to benefit from skilled PT / OT services to modify and progress therapeutic interventions, analyze and address functional mobility deficits, analyze and address ROM deficits, analyze and address strength deficits, analyze and address soft tissue restrictions, analyze and cue for proper movement patterns, analyze and modify for postural abnormalities, analyze and address imbalance/dizziness, and instruct in home and community integration to address functional deficits and attain remaining goals. Progress toward goals / Updated goals:  []  See Progress Note/Recertification    Goal: Pt to increase right shoulder PROM to WNL all planes without increased pain to increase ease of dressing. PN:143 degrees, scaption: 125 deg, ER @45 degrees ABD is 32 deg. (2/13/2023)  Current:   Goal: Pt to report < 4/10 pain at worst to increase ease with ADLs. PN: still 4-5/10 pain at worst   Current:   Goal: Pt to report FOTO score of 62 pts to show improved function and quality of life.   PN: FOTO 27  Current:     PLAN  Yes  Continue plan of care  []  Upgrade activities as tolerated  []  Discharge due to :  []  Other:    Domonique Simpson PTA    2/22/2023    5:07 PM    Future Appointments   Date Time Provider Linda Kan   2/27/2023  5:00 PM Lelo Fisher PTA [facility] [place] [facility]   3/1/2023  5:00 PM [facility]

## 2023-02-27 ENCOUNTER — HOSPITAL ENCOUNTER (OUTPATIENT)
Facility: HOSPITAL | Age: 68
Setting detail: RECURRING SERIES
End: 2023-02-27
Payer: COMMERCIAL

## 2023-03-01 ENCOUNTER — HOSPITAL ENCOUNTER (OUTPATIENT)
Facility: HOSPITAL | Age: 68
Setting detail: RECURRING SERIES
Discharge: HOME | End: 2023-03-04
Payer: COMMERCIAL

## 2023-03-01 PROCEDURE — 97140 MANUAL THERAPY 1/> REGIONS: CPT

## 2023-03-01 PROCEDURE — 97110 THERAPEUTIC EXERCISES: CPT

## 2023-03-01 NOTE — PROGRESS NOTES
PHYSICAL / OCCUPATIONAL THERAPY - DAILY TREATMENT NOTE (updated )    Patient Name: Michael Kiran    Date: 3/1/2023    : 1955  Insurance: Payor: Idalia Mccain / Plan: Shayna Sarabia / Product Type: *No Product type* /      Patient  verified Yes     Visit #   Current / Total 3 5   Time   In / Out 5:00 5:53   Pain   In / Out 2 2   Subjective Functional Status/Changes: I got some pain when reached back to put on my cost   Changes to:  Meds, Allergies, Med Hx, Sx Hx? If yes, update Summary List no       TREATMENT AREA =  Pain in right shoulder [M25.511]    OBJECTIVE    Modalities Rationale:     decrease inflammation and decrease pain to improve patient's ability to progress to PLOF and address remaining functional goals. min [] Estim Unattended, type/location:                                      []  w/ice    []  w/heat    min [] Estim Attended, type/location:                                     []  w/US     []  w/ice    []  w/heat    []  TENS insruct      min []  Mechanical Traction: type/lbs                   []  pro   []  sup   []  int   []  cont    []  before manual    []  after manual    min []  Ultrasound, settings/location:      min []  Iontophoresis w/ dexamethasone, location:                                               []  take home patch       []  in clinic   10 min  unbill [x]  Ice     []  Heat    location/position: Sitting right shoulder    min []  Paraffin,  details:     min []  Vasopneumatic Device, press/temp:     min []  Petra Ochoa / Rosalinda Valenzuela: If using vaso (only need to measure limb vaso being performed on)      pre-treatment girth :       post-treatment girth :       measured at (landmark location) :      min []  Other:    Skin assessment post-treatment (if applicable):    [x]  intact    []  redness- no adverse reaction                 []redness - adverse reaction:         Therapeutic Procedures:     Tx Min Billable or 1:1 Min (if diff from Tx Min) Procedure, Rationale, Specifics   30 30 [id number] Therapeutic Exercise (timed):  increase ROM, strength, coordination, balance, and proprioception to improve patient's ability to progress to PLOF and address remaining functional goals. (see flow sheet as applicable)     Details if applicable:       13 13 62535 Manual Therapy (timed):  decrease pain and increase ROM to improve patient's ability to progress to PLOF and address remaining functional goals. The manual therapy interventions were performed at a separate and distinct time from the therapeutic activities interventions . (see flow sheet as applicable)     Details if applicable:  STM to pec  major, UT and middle deltoid. Scap mobs in SL          Details if applicable:            Details if applicable:            Details if applicable:     36 39  BC Totals Reminder: bill using total billable min of TIMED therapeutic procedures (example: do not include dry needle or estim unattended, both untimed codes, in totals to left)  8-22 min = 1 unit; 23-37 min = 2 units; 38-52 min = 3 units; 53-67 min = 4 units; 68-82 min = 5 units   Total Total     [x]  Patient Education billed concurrently with other procedures   [x] Review HEP    [] Progressed/Changed HEP, detail:    [] Other detail:       Objective Information/Functional Measures/Assessment    Pt arrived today with minimal discomfort. Continues to report pain with abduction and reaching behind to don shirt. Palpable tenderness along middle deltoid with pain referral to biceps with STM. Will progress per protocol for strength and ROM.     Patient will continue to benefit from skilled PT / OT services to modify and progress therapeutic interventions, analyze and address functional mobility deficits, analyze and address ROM deficits, analyze and address strength deficits, analyze and address soft tissue restrictions, analyze and cue for proper movement patterns, analyze and modify for postural abnormalities, analyze and address imbalance/dizziness, and instruct in home and community integration to address functional deficits and attain remaining goals. Progress toward goals / Updated goals:  []  See Progress Note/Recertification    Goal: Pt to increase right shoulder PROM to WNL all planes without increased pain to increase ease of dressing. PN:143 degrees, scaption: 125 deg, ER @45 degrees ABD is 32 deg. (2/13/2023)  Current:   Goal: Pt to report < 4/10 pain at worst to increase ease with ADLs. PN: still 4-5/10 pain at worst   Current:   Goal: Pt to report FOTO score of 62 pts to show improved function and quality of life.   PN: FOTO 27  Current:     PLAN  Yes  Continue plan of care  []  Upgrade activities as tolerated  []  Discharge due to :  []  Other:    Soy Donnelly PTA    3/1/2023    5:54 PM    Future Appointments   Date Time Provider Linda Kan   3/7/2023  5:00 PM Courtney Cody, PT [facility] [place] [facility]   3/9/2023  4:30 PM Derik BRIGGS   3/13/2023  5:00 PM Alex Hyman PTA [facility] [place] [facility]   3/15/2023  5:00 PM [address]

## 2023-03-07 ENCOUNTER — HOSPITAL ENCOUNTER (OUTPATIENT)
Facility: HOSPITAL | Age: 68
Setting detail: RECURRING SERIES
Discharge: HOME | End: 2023-03-10
Payer: COMMERCIAL

## 2023-03-07 PROCEDURE — 97110 THERAPEUTIC EXERCISES: CPT

## 2023-03-07 NOTE — PROGRESS NOTES
PHYSICAL / OCCUPATIONAL THERAPY - DAILY TREATMENT NOTE (updated )    Patient Name: Magui Murray    Date: 3/7/2023    : 1955  Insurance: Payor: Dixie Sharma / Plan: Alveta Hale / Product Type: *No Product type* /      Patient  verified Yes     Visit #   Current / Total 4 5   Time   In / Out 1702 pm 1743 pm   Pain   In / Out 3 0   Subjective Functional Status/Changes: Pt reports MD follow up on 3/13/23   Changes to:  Meds, Allergies, Med Hx, Sx Hx? If yes, update Summary List no       TREATMENT AREA =  Pain in right shoulder [M25.511]    OBJECTIVE    Modalities Rationale:     decrease inflammation and decrease pain to improve patient's ability to progress to PLOF and address remaining functional goals. min [] Estim Unattended, type/location:                                      []  w/ice    []  w/heat    min [] Estim Attended, type/location:                                     []  w/US     []  w/ice    []  w/heat    []  TENS insruct      min []  Mechanical Traction: type/lbs                   []  pro   []  sup   []  int   []  cont    []  before manual    []  after manual    min []  Ultrasound, settings/location:      min []  Iontophoresis w/ dexamethasone, location:                                               []  take home patch       []  in clinic   10' + 1' set up min  unbill []  Ice     [x]  Heat    location/position: Sitting, right shoulder    min []  Paraffin,  details:     min []  Vasopneumatic Device, press/temp:     min []  Jodene Massing / Brandy Silvius: If using vaso (only need to measure limb vaso being performed on)      pre-treatment girth :       post-treatment girth :       measured at (landmark location) :      min []  Other:    Skin assessment post-treatment (if applicable):    [x]  intact    []  redness- no adverse reaction                 []redness - adverse reaction:         Therapeutic Procedures:     Tx Min Billable or 1:1 Min (if diff from Tx Min) Procedure, Rationale, Specifics   30 24 [id number] Therapeutic Exercise (timed):  increase ROM, strength, coordination, balance, and proprioception to improve patient's ability to progress to PLOF and address remaining functional goals. (see flow sheet as applicable)     Details if applicable:           Details if applicable:            Details if applicable:            Details if applicable:            Details if applicable:     30 24 [facility] Totals Reminder: bill using total billable min of TIMED therapeutic procedures (example: do not include dry needle or estim unattended, both untimed codes, in totals to left)  8-22 min = 1 unit; 23-37 min = 2 units; 38-52 min = 3 units; 53-67 min = 4 units; 68-82 min = 5 units   Total Total     [x]  Patient Education billed concurrently with other procedures   [x] Review HEP    [x] Progressed/Changed HEP, detail:prone I and rows   [] Other detail:       Objective Information/Functional Measures/Assessment    Pt reports continued pain in biceps that radiates. Audible clicking with supine AAROM right shoulder scaption with wand though patient reports no pain. Initiated prone rows and scapular retractions for improved scapular strengthening. Will plan to continue to progress patient as able pending MD's recommendations at his next follow up. Pt reports decreased pain levels post hot pack modalities. Patient will continue to benefit from skilled PT / OT services to modify and progress therapeutic interventions, analyze and address functional mobility deficits, analyze and address ROM deficits, analyze and address strength deficits, analyze and address soft tissue restrictions, analyze and cue for proper movement patterns, analyze and modify for postural abnormalities, analyze and address imbalance/dizziness, and instruct in home and community integration to address functional deficits and attain remaining goals.     Progress toward goals / Updated goals:  []  See Progress Note/Recertification    Goal: Pt to increase right shoulder PROM to WNL all planes without increased pain to increase ease of dressing. PN:143 degrees, scaption: 125 deg, ER @45 degrees ABD is 32 deg. (2/13/2023)  Current:   Goal: Pt to report < 4/10 pain at worst to increase ease with ADLs. PN: still 4-5/10 pain at worst   Current: 8/10 (3/7/23)   Goal: Pt to report FOTO score of 62 pts to show improved function and quality of life.   PN: FOTO 27  Current:     PLAN  Yes  Continue plan of care  []  Upgrade activities as tolerated  []  Discharge due to :  []  Other:    Dane Brown, PT    3/7/2023   6:20 PM     Future Appointments   Date Time Provider Linda Kan   3/13/2023  5:00 PM Mayelin Arreaga [facility] [place] [address]   3/15/2023  5:00 PM [facility]

## 2023-03-09 ENCOUNTER — APPOINTMENT (OUTPATIENT)
Facility: HOSPITAL | Age: 68
End: 2023-03-09
Payer: COMMERCIAL

## 2023-03-15 ENCOUNTER — APPOINTMENT (OUTPATIENT)
Facility: HOSPITAL | Age: 68
End: 2023-03-15
Payer: COMMERCIAL

## 2023-03-21 ENCOUNTER — APPOINTMENT (OUTPATIENT)
Facility: HOSPITAL | Age: 68
End: 2023-03-21
Payer: COMMERCIAL

## 2023-03-28 ENCOUNTER — APPOINTMENT (OUTPATIENT)
Facility: HOSPITAL | Age: 68
End: 2023-03-28
Payer: COMMERCIAL

## 2023-04-05 ENCOUNTER — HOSPITAL ENCOUNTER (OUTPATIENT)
Facility: HOSPITAL | Age: 68
Setting detail: RECURRING SERIES
Discharge: HOME | End: 2023-04-08
Payer: COMMERCIAL

## 2023-04-05 PROCEDURE — 97110 THERAPEUTIC EXERCISES: CPT

## 2023-04-05 PROCEDURE — 97140 MANUAL THERAPY 1/> REGIONS: CPT

## 2023-04-05 NOTE — PROGRESS NOTES
PHYSICAL / OCCUPATIONAL THERAPY - DAILY TREATMENT NOTE (updated )    Patient Name: Frankey Bellis    Date: 2023    : 1955  Insurance: Payor: Nicolas Perera / Plan: Hamilton Lu / Product Type: *No Product type* /      Patient  verified Yes     Visit #   Current / Total 5 5   Time   In / Out 4:56 5:36   Pain   In / Out 1 1   Subjective Functional Status/Changes: I do use my arm more but avoid lifting as per MD   Changes to:  Meds, Allergies, Med Hx, Sx Hx? If yes, update Summary List no       TREATMENT AREA =  Pain in right shoulder [M25.511]    OBJECTIVE         Therapeutic Procedures: Tx Min Billable or 1:1 Min (if diff from Tx Min) Procedure, Rationale, Specifics   10 10 [id number] Manual Therapy (timed):  increase ROM and increase tissue extensibility to improve patient's ability to progress to PLOF and address remaining functional goals. The manual therapy interventions were performed at a separate and distinct time from the therapeutic activities interventions . (see flow sheet as applicable)     Details if applicable:       30 30 44003 Therapeutic Exercise (timed):  increase ROM, strength, coordination, balance, and proprioception to improve patient's ability to progress to PLOF and address remaining functional goals.  (see flow sheet as applicable)     Details if applicable:            Details if applicable:            Details if applicable:            Details if applicable:     36 40 [facility] Totals Reminder: bill using total billable min of TIMED therapeutic procedures (example: do not include dry needle or estim unattended, both untimed codes, in totals to left)  8-22 min = 1 unit; 23-37 min = 2 units; 38-52 min = 3 units; 53-67 min = 4 units; 68-82 min = 5 units   Total Total     [x]  Patient Education billed concurrently with other procedures   [x] Review HEP    [] Progressed/Changed HEP, detail:    [] Other detail:       Objective Information/Functional Measures/Assessment    75%

## 2023-04-06 NOTE — PROGRESS NOTES
[address] [facility] AT [address], [facility] [address]  Phone: [phone] Fax: [phone]  PROGRESS NOTE  Patient Name: Joceline Rosa : 1955   Treatment/Medical Diagnosis: Pain in right shoulder [M25.511]   Referral Source: Jason Fuller MD     Payor: Payor: Lauri Pride / Plan: [employer] / Product Type: *No Product type* /            Date of Initial Visit: 2023 Attended Visits: 22 Missed Visits: 3   Reporting Period: 2/15/2023 to 2023      CURRENT GOAL STATUS  Goal: Pt to increase right shoulder PROM to WNL all planes without increased pain to increase ease of dressing. PN:143 degrees, scaption: 125 deg, ER @45 degrees ABD is 32 deg. (2023)  Current: AROM flexion 135, scaption 120, abduction 120                  PROM flexion 155, abduction 135, ER @ 4 degrees is 70 degrees; progressing   Goal: Pt to report < 4/10 pain at worst to increase ease with ADLs. PN: still 4-5/10 pain at worst   Current:  random pains 5-6/10 if reaching too far, brief in duration; not met   Goal: Pt to report FOTO score of 62 pts to show improved function and quality of life. PN: FOTO 27  Current:  FOTO 58, progressing     SUMMARY OF TREATMENT  Patient has attended 22 sessions of skilled PT to address Pain in right shoulder [M25.511]. Since [facility] he reports 75% improvement. He demonstrates AROM to 155 degrees flexion, scaption 120 degrees, however is limited by pain in biceps that radiates distally into muscle belly. Symptoms affect ability to reach [place], to the side and behind back. Palpable taut bands an tenderness over middle and lateal deltoid and biceps tendon. Able to use UE but avoids lifting as per MD protocol (MD follow-up within next 2 weeks).   He will benefit from continued skilled PT to address functional reach, progress to strengthening once cleared by MD.    Non-Medicare, can change goals, can adjust or add frequency duration,

## 2023-04-19 ENCOUNTER — HOSPITAL ENCOUNTER (OUTPATIENT)
Facility: HOSPITAL | Age: 68
Setting detail: RECURRING SERIES
Discharge: HOME | End: 2023-04-22
Payer: COMMERCIAL

## 2023-04-19 PROCEDURE — 97110 THERAPEUTIC EXERCISES: CPT

## 2023-04-19 PROCEDURE — 97140 MANUAL THERAPY 1/> REGIONS: CPT

## 2023-04-19 NOTE — PROGRESS NOTES
Measures/Assessment    Initiated gentle strengthening program per protocol. He was able to perform without c/o pain. Fatigue in shoulder stabilizers during ball on wall. Instructions  and education included to safely progress HEP as tolerated. 1# weights added today     Patient will continue to benefit from skilled PT / OT services to modify and progress therapeutic interventions, analyze and address functional mobility deficits, analyze and address ROM deficits, analyze and address strength deficits, analyze and address soft tissue restrictions, analyze and cue for proper movement patterns, analyze and modify for postural abnormalities, analyze and address imbalance/dizziness, and instruct in home and community integration to address functional deficits and attain remaining goals. Progress toward goals / Updated goals:  []  See Progress Note/Recertification    Goal: Pt to increase right shoulder AROM to WNL all planes without increased pain to increase ease of dressing. PN: AROM flexion 135, scaption 120, abduction 120  Goal: Pt to report < 4/10 pain at worst to increase ease with ADLs. PN: random pains 5-6/10 if reaching too far, brief in duration  Goal: Pt to report FOTO score of 62 pts to show improved function and quality of life. PN: FOTO 58  Goal: Pt will improve Right shoulder strength to at least 4/5 grossly for ease with ADL's.    PN: New goal; Has not yet initiated weights per MD protocol    PLAN  Yes  Continue plan of care  []  Upgrade activities as tolerated  []  Discharge due to :  []  Other:    Kiel Saha PTA    4/19/2023    5:54 PM    Future Appointments   Date Time Provider Linda Kan   5/3/2023  5:20 PM [facility] [place] [facility]   5/17/2023  5:20 PM Eleanor Mcneill [facility]   5/31/2023  5:20 PM [facility] [place] [facility]

## 2024-08-07 ENCOUNTER — HOSPITAL ENCOUNTER (OUTPATIENT)
Facility: HOSPITAL | Age: 69
Setting detail: RECURRING SERIES
Discharge: HOME | End: 2024-08-10
Payer: COMMERCIAL

## 2024-08-07 PROCEDURE — 97162 PT EVAL MOD COMPLEX 30 MIN: CPT

## 2024-08-07 NOTE — PROGRESS NOTES
PT DAILY TREATMENT NOTE/HIP [id number]      Patient Name: Maria Elena Snell    Date: 2024    : 1955  [facility] / Plan: [employer] / Product Type: *No Product type* /      Patient  verified yes     Visit #   Current / Total 1 24   Time   In / Out 3:20 4:10   Pain   In / Out 5 5   Subjective Functional Status/Changes: See Subjective Summary     Treatment Area: Pain in left hip [M25.552]  Sciatica, left side [M54.32]    SUBJECTIVE    Subjective functional status/changes:     Chief Complaint: left hip and lower leg pain  History/Mechanism of Injury: 24 insidious onset  Current Symptoms/Functional Deficits: calf cramping, increased ankle pressure, numbness along posterior thigh, reduced stability requiring UE support, pain with standing and bending,   Aggravating/Alleviating factors: standing/walking  Pain-  Current: 5/10     Worst: 2/10   Best: 8/10  Previous Treatment/Compliance: chiropractic, massage without benefit, prescription steroid medication  Mobility Devices: none reported  PMHx/Surgical Hx: HTN, hx skin CA, left meniscus repair , C5-C7 cervical fusion , susequent cervical surgeries to removal osteophytes , left shoulder arthroscopic surgery , right shoulder biceps tenodesis      Diagnostic Tests: [] Lab work [] X-rays    [] CT [] MRI     [] Other:  Results:    Work Hx: Works full-time as Manager at [employer], work duties include prolonged walking  Living Situation: Lives with spouse in a 2 story home, with 4 stairs to enter  Household Modifications: none reported  Hobbies: wood working,  work on his cars,   PLOF: Previously functionally independent with all ADLs, participated in wood working and  work  Pt Goals: \"to avoid surgery\"    OBJECTIVE/EXAMINATION        50 min [x]Eval  - untimed                  [x]  Patient Education billed concurrently with other procedures       Physical Therapy Evaluation- Hip    Palpation: TTP

## 2024-08-07 NOTE — PROGRESS NOTES
[facility] AT [facility]  [address], [place] [address] Phone: [phone] Fax [phone]  Plan of Care / Statement of Necessity for Physical Therapy Services     Patient Name: Maria Elena Snell : 1955   Treatment   Diagnosis: M25.552  LEFT HIP PAIN  and M54.59  OTHER LOWER BACK PAIN and M54.42  LUMBAGO WITH SCIATICA, LEFT SIDE Medical Diagnosis: Pain in left hip [M25.552]  Sciatica, left side [M54.32]   Onset Date: 2024 Payor Source: Payor: SENTARA / Plan: S[employer]ARA PLUS PPO / Product Type: *No Product type* /    Referral Source: Gema Negron MD Start of Care (SOC): 2024   Prior Hospitalization: See medical history Provider #: [id number]   Prior Level of Function: Previously functionally independent with all ADLs, participated in wood working and  work, Works full-time as Manager at [employer], work duties include prolonged walking, Lives with spouse in a 2 story home, with 4 stairs to enter   Comorbidities: Musculoskeletal disorders and Other: HTN, hx skin CA, left meniscus repair , C5-C7 cervical fusion , susequent cervical surgeries to removal osteophytes , left shoulder arthroscopic surgery , right shoulder biceps tenodesis      Assessment / key information:  Pt is a 68 y.o. male who presents with c/o left hip and leg pain following acute onset on 24 without specific MALIK. Symptoms have reduced minimally with initiation of oral steroids yesterday. Functional deficits include: calf cramping, increased ankle pressure, numbness along posterior thigh, reduced stability requiring UE support, pain with standing and bending. Upon exam, pt exhibited reduced left LE strength and flexibility, impaired left LE light touch sensation, impaired balance and gait, reduced transfers performance, and TTP. Signs and symptoms consistent with L4-L5 radiculopathy. Patient scored 35/80 on LEFS

## 2024-08-16 ENCOUNTER — APPOINTMENT (OUTPATIENT)
Facility: HOSPITAL | Age: 69
End: 2024-08-16
Payer: COMMERCIAL

## 2024-08-19 ENCOUNTER — HOSPITAL ENCOUNTER (OUTPATIENT)
Facility: HOSPITAL | Age: 69
Setting detail: RECURRING SERIES
Discharge: HOME | End: 2024-08-22
Payer: COMMERCIAL

## 2024-08-19 PROCEDURE — 97110 THERAPEUTIC EXERCISES: CPT

## 2024-08-19 PROCEDURE — 97530 THERAPEUTIC ACTIVITIES: CPT

## 2024-08-19 PROCEDURE — 97112 NEUROMUSCULAR REEDUCATION: CPT

## 2024-08-19 NOTE — PROGRESS NOTES
PHYSICAL / OCCUPATIONAL THERAPY - DAILY TREATMENT NOTE    Patient Name: Maria Elena Snell    Date: 2024    : 1955  Insurance: Payor: SENTARA / Plan: S[employer]ARA PLUS PPO / Product Type: *No Product type* /      Patient  verified Yes     Visit #   Current / Total 2 16   Time   In / Out 5:20 6:00   Pain   In / Out 6/10 5/10   Subjective Functional Status/Changes: I have that numbness that travels down my left hamstrings into      TREATMENT AREA =  Pain in left hip [M25.552]  Sciatica, left side [M54.32]     OBJECTIVE         Therapeutic Procedures:    Tx Min Billable or 1:1 Min (if diff from Tx Min) Procedure, Rationale, Specifics   12 12 07006 Therapeutic Exercise (timed):  increase ROM, strength, coordination, balance, and proprioception to improve patient's ability to progress to PLOF and address remaining functional goals. (see flow sheet as applicable)     Details if applicable:        47180 Neuromuscular Re-Education (timed):  improve balance, coordination, kinesthetic sense, posture, core stability and proprioception to improve patient's ability to develop conscious control of individual muscles and awareness of position of extremities in order to progress to PLOF and address remaining functional goals. (see flow sheet as applicable)     Details if applicable:     10 10 93407 Therapeutic Activity (timed):  use of dynamic activities replicating functional movements to increase ROM, strength, coordination, balance, and proprioception in order to improve patient's ability to progress to PLOF and address remaining functional goals.  (see flow sheet as applicable)     Details if applicable:               40 40 [facility] Totals Reminder: bill using total billable min of TIMED therapeutic procedures (example: do not include dry needle or estim unattended, both untimed codes, in totals to left)  8-22 min = 1 unit; 23-37 min = 2 units; 38-52 min = 3 units; 53-67 min = 4 units; 68-82 min = 5 units   Total Total

## 2024-09-04 ENCOUNTER — APPOINTMENT (OUTPATIENT)
Facility: HOSPITAL | Age: 69
End: 2024-09-04
Payer: COMMERCIAL

## 2024-09-09 ENCOUNTER — HOSPITAL ENCOUNTER (OUTPATIENT)
Facility: HOSPITAL | Age: 69
Setting detail: RECURRING SERIES
Discharge: HOME | End: 2024-09-12
Payer: COMMERCIAL

## 2024-09-09 PROCEDURE — 97530 THERAPEUTIC ACTIVITIES: CPT

## 2024-09-09 PROCEDURE — 97112 NEUROMUSCULAR REEDUCATION: CPT

## 2024-09-16 ENCOUNTER — HOSPITAL ENCOUNTER (OUTPATIENT)
Facility: HOSPITAL | Age: 69
Setting detail: RECURRING SERIES
Discharge: HOME | End: 2024-09-19
Payer: COMMERCIAL

## 2024-09-16 PROCEDURE — 97112 NEUROMUSCULAR REEDUCATION: CPT

## 2024-09-16 PROCEDURE — 97530 THERAPEUTIC ACTIVITIES: CPT

## 2024-09-16 PROCEDURE — 97110 THERAPEUTIC EXERCISES: CPT

## 2024-09-23 ENCOUNTER — HOSPITAL ENCOUNTER (OUTPATIENT)
Facility: HOSPITAL | Age: 69
Setting detail: RECURRING SERIES
Discharge: HOME | End: 2024-09-26
Payer: COMMERCIAL

## 2024-09-23 PROCEDURE — 97112 NEUROMUSCULAR REEDUCATION: CPT

## 2024-09-23 PROCEDURE — 97110 THERAPEUTIC EXERCISES: CPT

## 2024-09-30 ENCOUNTER — HOSPITAL ENCOUNTER (OUTPATIENT)
Facility: HOSPITAL | Age: 69
Setting detail: RECURRING SERIES
Discharge: HOME | End: 2024-10-03
Payer: COMMERCIAL

## 2024-09-30 PROCEDURE — 97112 NEUROMUSCULAR REEDUCATION: CPT

## 2024-09-30 PROCEDURE — 97032 APPL MODALITY 1+ESTIM EA 15: CPT

## 2024-09-30 PROCEDURE — 97110 THERAPEUTIC EXERCISES: CPT

## 2024-09-30 NOTE — PROGRESS NOTES
PHYSICAL / OCCUPATIONAL THERAPY - DAILY TREATMENT NOTE    Patient Name: Maria Elena Snell    Date: 2024    : 1955  Insurance: Payor: SENTARA / Plan: S[employer]ARA PLUS PPO / Product Type: *No Product type* /      Patient  verified Yes     Visit #   Current / Total 3 5   Time   In / Out 4:40 5:22   Pain   In / Out 3/10 2/10   Subjective Functional Status/Changes: My right hip is feeling it right now.      TREATMENT AREA =  Pain in left hip [M25.552]  Sciatica, left side [M54.32]     OBJECTIVE    Modalities Rationale:     increase muscle contraction/control to improve patient's ability to progress to PLOF and address remaining functional goals.     min [] Estim Unattended, type/location:                                      []  w/ice    []  w/heat   10 + 2 minute set up.  min [] Estim Attended, type/location:  Pakistani: cycle time: 2024, burst frequency: 50 bps, ramp 2 seconds, pad placement:  left: tibialis anterior/ fibularis longus.                                    []  w/US     []  w/ice    []  w/heat    []  TENS insruct      min []  Mechanical Traction: type/lbs                   []  pro   []  sup   []  int   []  cont    []  before manual    []  after manual    min []  Ultrasound, settings/location:      min  unbill []  Ice     []  Heat    location/position:     min []  Paraffin,  details:     min []  Vasopneumatic Device, press/temp:     min []  Whirlpool / Fluido:    If using vaso (only need to measure limb vaso being performed on)      pre-treatment girth :       post-treatment girth :       measured at (landmark location) :      min []  Other:    Skin assessment post-treatment:   Intact      Therapeutic Procedures:    Tx Min Billable or 1:1 Min (if diff from Tx Min) Procedure, Rationale, Specifics   20 62 [id number] Neuromuscular Re-Education (timed):  improve balance, coordination, kinesthetic sense, posture, core stability and proprioception to improve patient's ability to develop conscious control

## 2024-10-07 ENCOUNTER — HOSPITAL ENCOUNTER (OUTPATIENT)
Facility: HOSPITAL | Age: 69
Setting detail: RECURRING SERIES
Discharge: HOME | End: 2024-10-10
Payer: COMMERCIAL

## 2024-10-07 PROCEDURE — 97112 NEUROMUSCULAR REEDUCATION: CPT

## 2024-10-07 PROCEDURE — 97530 THERAPEUTIC ACTIVITIES: CPT

## 2024-10-07 PROCEDURE — 97110 THERAPEUTIC EXERCISES: CPT

## 2024-10-07 NOTE — PROGRESS NOTES
[facility] AT [facility]   [address], [place] [address]  Phone: [phone] Fax: [phone]  PROGRESS NOTE  Patient Name: Maria Elena Snell : 1955   Treatment/Medical Diagnosis: Pain in left hip [M25.552]  Sciatica, left side [M54.32]   Referral Source: Gema Negron MD     Payor: Payor: SENTARA / Plan: SENTARA PLUS PPO / Product Type: *No Product type* /            Date of Initial Visit: 24 Attended Visits: 7 Missed Visits: 0       CURRENT GOAL STATUS  Long Term Goals: To be accomplished in 5 treatments:  1- Goal: Pt to be independent with final HEP to improve performance with ADLs.  Status at last note/certification:  progressing: performing initial HEP roughly 3 times a week. (2024)  Current: Progressing - pt reports initial HEP performance 3-4x/week (10/07/24)     2- Goal: Pt to improve left hip ext/abd, ankle DF/PF, and great toe ext to match to left to reduce gait deviations and improve walking tolerance.  Status at last note/certification: progressing: (2024)   Strength: Right (/5) Left (/5)   Hip     Abduction 4+ 4+             Extension 4- 4+   Ankle   Dorsiflexion 4+ 4-               PF 25(reps)  19(reps) with compensations   Great Toe Extension 5 3+   Current: Progressing (10/047/24)   Strength: Right (/5) Left (/5)   Hip     Abduction 4+ 4+             Extension 4+ 4+   Ankle   Dorsiflexion 5 4-               PF 25(reps)  25(reps) 1/2 range   Great Toe Extension 5 3+         3- Goal: Pt to improve left SLS to > 30 seconds with mild deviations to improve single limb stability while participating in recreational activities.  Status at last note/certification:  not met: 2 seconds. (2024)   Current: Not met - 3 seconds (10/07/24)     4- Goal: Pt to improve 30 second sit to stand to > 10 reps without UE support or pain to improve transfers and tolerance for position changes.  Status at last note/certification:  
be accomplished in 5 treatments:  1- Goal: Pt to be independent with final HEP to improve performance with ADLs.  Status at last note/certification:  progressing: performing initial HEP roughly 3 times a week. (2024)  Current: Progressing - pt reports initial HEP performance 3-4x/week (10/07/24)     2- Goal: Pt to improve left hip ext/abd, ankle DF/PF, and great toe ext to match to left to reduce gait deviations and improve walking tolerance.  Status at last note/certification: progressing: (2024)   Strength: Right (/5) Left (/5)   Hip     Abduction 4+ 4+             Extension 4- 4+   Ankle   Dorsiflexion 4+ 4-               PF 25(reps)  19(reps) with compensations   Great Toe Extension 5 3+   Current: Progressing (10/047/24)   Strength: Right (/5) Left (/5)   Hip     Abduction 4+ 4+             Extension 4+ 4+   Ankle   Dorsiflexion 5 4-               PF 25(reps)  25(reps) 1/2 range   Great Toe Extension 5 3+        3- Goal: Pt to improve left SLS to > 30 seconds with mild deviations to improve single limb stability while participating in recreational activities.  Status at last note/certification:  not met: 2 seconds. (2024)   Current: Not met - 3 seconds (10/07/24)     4- Goal: Pt to improve 30 second sit to stand to > 10 reps without UE support or pain to improve transfers and tolerance for position changes.  Status at last note/certification:  progressin repetitions with no UE support. (2024)  Current: met: 10 repetitions from low plinth with no UE assistance. (2024)     5- Goal: Pt to report < 2/10 pain at worst to increase ease with ADLs.  Status at last note/certification: progressin/10 pain at worst. (2024)  Current: Progressing - 2/10 worst reported pain in the last week (10/07/24)     6- Goal: Pt to report LEFS score of 53 or greater at time of discharge to show improved function and quality of life.  Status at last note/certification:  currently met: 61/80.